# Patient Record
Sex: FEMALE | Race: BLACK OR AFRICAN AMERICAN | Employment: OTHER | URBAN - METROPOLITAN AREA
[De-identification: names, ages, dates, MRNs, and addresses within clinical notes are randomized per-mention and may not be internally consistent; named-entity substitution may affect disease eponyms.]

---

## 2017-05-11 ENCOUNTER — OFFICE VISIT (OUTPATIENT)
Dept: INTERNAL MEDICINE CLINIC | Age: 82
End: 2017-05-11

## 2017-05-11 VITALS
TEMPERATURE: 97.6 F | BODY MASS INDEX: 20.98 KG/M2 | HEART RATE: 68 BPM | RESPIRATION RATE: 14 BRPM | HEIGHT: 62 IN | OXYGEN SATURATION: 98 % | DIASTOLIC BLOOD PRESSURE: 72 MMHG | SYSTOLIC BLOOD PRESSURE: 134 MMHG | WEIGHT: 114 LBS

## 2017-05-11 DIAGNOSIS — I10 BENIGN HYPERTENSION: ICD-10-CM

## 2017-05-11 DIAGNOSIS — E78.2 MIXED HYPERLIPIDEMIA: Primary | ICD-10-CM

## 2017-05-11 DIAGNOSIS — Z79.899 ENCOUNTER FOR LONG-TERM (CURRENT) DRUG USE: ICD-10-CM

## 2017-05-11 DIAGNOSIS — R73.09 ELEVATED GLUCOSE: ICD-10-CM

## 2017-05-11 NOTE — PROGRESS NOTES
1. Have you been to the ER, urgent care clinic since your last visit? Hospitalized since your last visit? No    2. Have you seen or consulted any other health care providers outside of the [address] [place] [address] since your last visit? Include any pap smears or colon screening. No       Chief Complaint   Patient presents with    Hypertension     6 month follow up    Cholesterol Problem     6 month follow up    Coronary Artery Disease     6 month follow up    Vitamin D Deficiency     6 month follow up       Not fasting.

## 2017-05-11 NOTE — MR AVS SNAPSHOT
Visit Information Date & Time Provider Department Dept. Phone Encounter #  
 5/11/2017 11:40 AM Sina Willson MD [place] [id number] [facility] [phone] Follow-up Instructions Return in about 6 months (around 11/11/2017). Upcoming Health Maintenance Date Due DTaP/Tdap/Td series (1 - Tdap) 6/12/1952 Pneumococcal 65+ Low/Medium Risk (2 of 2 - PCV13) 1/2/2015 INFLUENZA AGE 9 TO ADULT 8/1/2017 MEDICARE YEARLY EXAM 11/10/2017 GLAUCOMA SCREENING Q2Y 7/1/2018 Allergies as of 5/11/2017  Review Complete On: 5/11/2017 By: Sina Willson MD  
  
 Severity Noted Reaction Type Reactions Aspirin  02/02/2012   Side Effect Hives Low dose aspirin is ok. Pcn [Penicillins]  01/06/2012    Rash Current Immunizations  Reviewed on 4/25/2016 Name Date Influenza Vaccine 10/23/2015 12:46 PM, 10/23/2014, 11/1/2013 Pneumococcal Polysaccharide (PPSV-23) 1/2/2014 Not reviewed this visit You Were Diagnosed With   
  
 Codes Comments Mixed hyperlipidemia    -  Primary ICD-10-CM: G54.9 ICD-9-CM: 272.2 Benign hypertension     ICD-10-CM: I10 
ICD-9-CM: 401.1 Seasonal allergic rhinitis, unspecified allergic rhinitis trigger     ICD-10-CM: J30.2 ICD-9-CM: 477.9 Encounter for long-term (current) drug use     ICD-10-CM: Z79.899 ICD-9-CM: V58.69 Vitals BP Pulse Temp Resp Height(growth percentile) Weight(growth percentile) 134/72 (BP 1 Location: Left arm, BP Patient Position: Sitting) 68 97.6 °F (36.4 °C) (Oral) 14 5' 1.5\" (1.562 m) 114 lb (51.7 kg) SpO2 BMI OB Status Smoking Status 98% 21.19 kg/m2 Postmenopausal Former Smoker Vitals History BMI and BSA Data Body Mass Index Body Surface Area  
 21.19 kg/m 2 1.5 m 2 Preferred Pharmacy Pharmacy Name Phone [facility] [address] [phone] Your Updated Medication List  
  
   
 This list is accurate as of: 5/11/17  1:00 PM.  Always use your most recent med list.  
  
  
  
  
 alendronate 70 mg tablet Commonly known as:  FOSAMAX Take 1 Tab by mouth every seven (7) days. amLODIPine-valsartan  mg per tablet Commonly known as:  Elesa Spotted Take 1 Tab by mouth daily. Need office visit for further refills  
  
 aspirin delayed-release 81 mg tablet Take 81 mg by mouth daily. metoprolol tartrate 50 mg tablet Commonly known as:  LOPRESSOR  
TAKE ONE TABLET BY MOUTH TWICE A DAY  
  
 rosuvastatin 20 mg tablet Commonly known as:  CRESTOR Take 1 Tab by mouth nightly. VITAMIN D3 2,000 unit Tab Generic drug:  cholecalciferol (vitamin D3) Take 1 tablet by mouth daily. ZETIA 10 mg tablet Generic drug:  ezetimibe TAKE ONE TABLET BY MOUTH DAILY Follow-up Instructions Return in about 6 months (around 11/11/2017). Introducing [facility]! [employer] introduces [employer] patient portal. Now you can access parts of your medical record, email your doctor's office, and request medication refills online. 1. In your internet browser, go to [email or web address] 2. Click on the First Time User? Click Here link in the Sign In box. You will see the New Member Sign Up page. 3. Enter your [employer] Access Code exactly as it appears below. You will not need to use this code after youve completed the sign-up process. If you do not sign up before the expiration date, you must request a new code. · [employer] Access Code: [email or web address] Expires: 8/9/2017  1:00 PM 
 
4. Enter the last four digits of your Social Security Number (xxxx) and Date of Birth (mm/dd/yyyy) as indicated and click Submit. You will be taken to the next sign-up page. 5. Create a [employer] ID. This will be your [employer] login ID and cannot be changed, so think of one that is secure and easy to remember. 6. Create a [employer] password. You can change your password at any time. 7. Enter your Password Reset Question and Answer. This can be used at a later time if you forget your password. 8. Enter your e-mail address. You will receive e-mail notification when new information is available in [address] 9. Click Sign Up. You can now view and download portions of your medical record. 10. Click the Download Summary menu link to download a portable copy of your medical information. If you have questions, please visit the Frequently Asked Questions section of the [employer] website. Remember, [employer] is NOT to be used for urgent needs. For medical emergencies, dial 911. Now available from your iPhone and Android! Please provide this summary of care documentation to your next provider. Your primary care clinician is listed as Giselle Banegas. If you have any questions after today's visit, please call [phone].

## 2017-05-11 NOTE — PROGRESS NOTES
Subjective:      Mary Burden is a 80 y.o. female who presents today for follow up of her hypertension and hyperlipidemia with CAD, and elevated glucose. She remains active. Lives with her daughter. She denies any chest pain, shortness of breath, syncope, headaches, nausea/vomiting, or any bowel changes. Patient Active Problem List   Diagnosis Code    S/P CABG x 3 Z95.1    S/P colonoscopy Z98.890    HTN (hypertension) I10    Hyperlipidemia E78.5    Environmental allergies Z91.09    CAD (coronary artery disease) I25.10    Vitamin D deficiency E55.9    Advance directive discussed with patient Z70.80    Osteoporosis M81.0    H/O bone density study Z92.89     Current Outpatient Prescriptions   Medication Sig Dispense Refill    metoprolol tartrate (LOPRESSOR) 50 mg tablet TAKE ONE TABLET BY MOUTH TWICE A DAY 60 Tab 5    ZETIA 10 mg tablet TAKE ONE TABLET BY MOUTH DAILY 30 Tab 5    amLODIPine-valsartan (EXFORGE)  mg per tablet Take 1 Tab by mouth daily. Need office visit for further refills 90 Tab 0    rosuvastatin (CRESTOR) 20 mg tablet Take 1 Tab by mouth nightly. 30 Tab 5    cholecalciferol, vitamin D3, (VITAMIN D3) 2,000 unit tab Take 1 tablet by mouth daily.  aspirin delayed-release 81 mg tablet Take 81 mg by mouth daily.  alendronate (FOSAMAX) 70 mg tablet Take 1 Tab by mouth every seven (7) days. 12 Tab 3        Review of Systems    Pertinent items are noted in HPI.      Objective:     Visit Vitals    /72 (BP 1 Location: Left arm, BP Patient Position: Sitting)    Pulse 68    Temp 97.6 °F (36.4 °C) (Oral)    Resp 14    Ht 5' 1.5\" (1.562 m)    Wt 114 lb (51.7 kg)    SpO2 98%    BMI 21.19 kg/m2     General appearance: alert, cooperative, no distress, appears stated age  Head: Normocephalic, without obvious abnormality, atraumatic  Neck: supple, symmetrical, trachea midline, no adenopathy, no carotid bruit and no JVD  Lungs: clear to auscultation bilaterally  Heart: regular rate and rhythm  Extremities: extremities normal, atraumatic, no cyanosis or edema  Pulses: 2+ and symmetric    Assessment/Plan:     1. Mixed hyperlipidemia  -due for fasting lipid panel at next visit. Last done 11/2016    2. Benign hypertension  -I evaluated and recommended to continue current doses of medications. 3. Elevated glucose  -encouraged low sugar and carb diet.  -check hemoglobin A1c at next visit    4. Encounter for long-term (current) drug use       Follow-up Disposition:     Follow up in 6 months     Return if symptoms worsen or fail to improve. Advised patient to call back or return to office if symptoms worsen/change/persist.     Discussed expected course/resolution/complications of diagnosis in detail with patient. Medication risks/benefits/costs/interactions/alternatives discussed with patient. Patient was given an after visit summary which includes diagnoses, current medications, & vitals. Patient expressed understanding with the diagnosis and plan.

## 2017-05-18 RX ORDER — METOPROLOL TARTRATE 50 MG/1
TABLET ORAL
Qty: 180 TAB | Refills: 1 | Status: SHIPPED | OUTPATIENT
Start: 2017-05-18 | End: 2019-01-14

## 2017-08-02 RX ORDER — EZETIMIBE 10 MG/1
TABLET ORAL
Qty: 30 TAB | Refills: 5 | Status: SHIPPED | OUTPATIENT
Start: 2017-08-02 | End: 2018-01-30 | Stop reason: SDUPTHER

## 2017-11-10 ENCOUNTER — OFFICE VISIT (OUTPATIENT)
Dept: INTERNAL MEDICINE CLINIC | Age: 82
End: 2017-11-10

## 2017-11-10 VITALS
RESPIRATION RATE: 14 BRPM | SYSTOLIC BLOOD PRESSURE: 145 MMHG | HEIGHT: 62 IN | HEART RATE: 65 BPM | OXYGEN SATURATION: 96 % | DIASTOLIC BLOOD PRESSURE: 72 MMHG | WEIGHT: 113 LBS | TEMPERATURE: 98.7 F | BODY MASS INDEX: 20.8 KG/M2

## 2017-11-10 DIAGNOSIS — E78.2 MIXED HYPERLIPIDEMIA: ICD-10-CM

## 2017-11-10 DIAGNOSIS — R73.09 ELEVATED GLUCOSE: ICD-10-CM

## 2017-11-10 DIAGNOSIS — I10 BENIGN HYPERTENSION: Primary | ICD-10-CM

## 2017-11-10 DIAGNOSIS — Z79.899 ENCOUNTER FOR LONG-TERM (CURRENT) USE OF MEDICATIONS: ICD-10-CM

## 2017-11-10 DIAGNOSIS — Z23 ENCOUNTER FOR IMMUNIZATION: ICD-10-CM

## 2017-11-10 NOTE — PROGRESS NOTES
Subjective:      Leoncio Soto is a 80 y.o. female who presents today for follow up of her hypertension and elevated glucose. She was started on fosamax 7/2016 for osteoporosis, but stopped taking it. She has not reason. She denies any chest pain, shortness of breath, syncope, headaches, nausea/vomiting, or any bowel changes. Patient Active Problem List   Diagnosis Code    S/P CABG x 3 Z95.1    S/P colonoscopy Z98.890    HTN (hypertension) I10    Hyperlipidemia E78.5    Environmental allergies Z91.09    CAD (coronary artery disease) I25.10    Vitamin D deficiency E55.9    Advance directive discussed with patient Z70.80    Osteoporosis M81.0    H/O bone density study Z92.89     Current Outpatient Prescriptions   Medication Sig Dispense Refill    ezetimibe (ZETIA) 10 mg tablet TAKE ONE TABLET BY MOUTH DAILY 30 Tab 5    metoprolol tartrate (LOPRESSOR) 50 mg tablet TAKE ONE TABLET BY MOUTH TWICE A  Tab 1    amLODIPine-valsartan (EXFORGE)  mg per tablet Take 1 Tab by mouth daily. Need office visit for further refills 90 Tab 0    rosuvastatin (CRESTOR) 20 mg tablet Take 1 Tab by mouth nightly. 30 Tab 5    cholecalciferol, vitamin D3, (VITAMIN D3) 2,000 unit tab Take 1 tablet by mouth daily.  aspirin delayed-release 81 mg tablet Take 81 mg by mouth daily. Review of Systems    Pertinent items are noted in HPI.      Objective:     Visit Vitals    /72    Pulse 65    Temp 98.7 °F (37.1 °C) (Oral)    Resp 14    Ht 5' 1.5\" (1.562 m)    Wt 113 lb (51.3 kg)    SpO2 96%    BMI 21.01 kg/m2     General appearance: alert, cooperative, no distress, appears stated age  Head: Normocephalic, without obvious abnormality, atraumatic  Neck: supple, symmetrical, trachea midline, no adenopathy, no carotid bruit and no JVD  Lungs: clear to auscultation bilaterally  Heart: regular rate and rhythm, systolic murmur: systolic ejection 1/6,  at 2nd left intercostal space, at 2nd right intercostal space    Assessment/Plan:     1. Encounter for immunization  -received her flu vaccine today without any complications. - Influenza virus vaccine ([address]) 72 years and older ([address])  - Administration fee () for Medicare insured patients    2. Benign hypertension  -I evaluated and recommended to continue current doses of medications. - CBC WITH AUTOMATED DIFF  - METABOLIC PANEL, COMPREHENSIVE    3. Elevated glucose  -continue low sugar and carb diet. - METABOLIC PANEL, COMPREHENSIVE  - HEMOGLOBIN A1C WITH EAG    4. Mixed hyperlipidemia  -hx of CAD. -due for fasting lipid panel at this time.     - METABOLIC PANEL, COMPREHENSIVE  - LIPID PANEL    5. Encounter for long-term (current) use of medications    - CBC WITH AUTOMATED DIFF  - METABOLIC PANEL, COMPREHENSIVE  - LIPID PANEL     Orders Placed This Encounter    Influenza virus vaccine ([address]) 72 years and older ([address])    CBC WITH AUTOMATED DIFF    METABOLIC PANEL, COMPREHENSIVE    LIPID PANEL    HEMOGLOBIN A1C WITH EAG    Administration fee () for Medicare insured patients      Follow-up Disposition:     Follow up in 6 months     Return if symptoms worsen or fail to improve. Advised patient to call back or return to office if symptoms worsen/change/persist.     Discussed expected course/resolution/complications of diagnosis in detail with patient. Medication risks/benefits/costs/interactions/alternatives discussed with patient. Patient was given an after visit summary which includes diagnoses, current medications, & vitals. Patient expressed understanding with the diagnosis and plan.

## 2017-11-10 NOTE — PATIENT INSTRUCTIONS
Vaccine Information Statement    Influenza (Flu) Vaccine (Inactivated or Recombinant): What you need to know    Many Vaccine Information Statements are available in Welsh and other languages. See www.immunize.org/vis  Hojas de Información Sobre Vacunas están disponibles en Español y en muchos otros idiomas. Visite www.immunize.org/vis    1. Why get vaccinated? Influenza (flu) is a contagious disease that spreads around the [place] every year, usually between October and May. Flu is caused by influenza viruses, and is spread mainly by coughing, sneezing, and close contact. Anyone can get flu. Flu strikes suddenly and can last several days. Symptoms vary by age, but can include:   fever/chills   sore throat   muscle aches   fatigue   cough   headache    runny or stuffy nose    Flu can also lead to pneumonia and blood infections, and cause diarrhea and seizures in children. If you have a medical condition, such as heart or lung disease, flu can make it worse. Flu is more dangerous for some people. Infants and young children, people 72years of age and older, pregnant women, and people with certain health conditions or a weakened immune system are at greatest risk. Each year thousands of people in the [facility] die from flu, and many more are hospitalized. Flu vaccine can:   keep you from getting flu,   make flu less severe if you do get it, and   keep you from spreading flu to your family and other people. 2. Inactivated and recombinant flu vaccines    A dose of flu vaccine is recommended every flu season. Children 6 months through 6years of age may need two doses during the same flu season. Everyone else needs only one dose each flu season.        Some inactivated flu vaccines contain a very small amount of a mercury-based preservative called thimerosal. Studies have not shown thimerosal in vaccines to be harmful, but flu vaccines that do not contain thimerosal are available. There is no live flu virus in flu shots. They cannot cause the flu. There are many flu viruses, and they are always changing. Each year a new flu vaccine is made to protect against three or four viruses that are likely to cause disease in the upcoming flu season. But even when the vaccine doesnt exactly match these viruses, it may still provide some protection    Flu vaccine cannot prevent:   flu that is caused by a virus not covered by the vaccine, or   illnesses that look like flu but are not. It takes about 2 weeks for protection to develop after vaccination, and protection lasts through the flu season. 3. Some people should not get this vaccine    Tell the person who is giving you the vaccine:     If you have any severe, life-threatening allergies. If you ever had a life-threatening allergic reaction after a dose of flu vaccine, or have a severe allergy to any part of this vaccine, you may be advised not to get vaccinated. Most, but not all, types of flu vaccine contain a small amount of egg protein.  If you ever had Guillain-Barré Syndrome (also called GBS). Some people with a history of GBS should not get this vaccine. This should be discussed with your doctor.  If you are not feeling well. It is usually okay to get flu vaccine when you have a mild illness, but you might be asked to come back when you feel better. 4. Risks of a vaccine reaction    With any medicine, including vaccines, there is a chance of reactions. These are usually mild and go away on their own, but serious reactions are also possible. Most people who get a flu shot do not have any problems with it.      Minor problems following a flu shot include:    soreness, redness, or swelling where the shot was given     hoarseness   sore, red or itchy eyes   cough   fever   aches   headache   itching   fatigue  If these problems occur, they usually begin soon after the shot and last 1 or 2 days. More serious problems following a flu shot can include the following:     There may be a small increased risk of Guillain-Barré Syndrome (GBS) after inactivated flu vaccine. This risk has been estimated at 1 or 2 additional cases per million people vaccinated. This is much lower than the risk of severe complications from flu, which can be prevented by flu vaccine.  Young children who get the flu shot along with pneumococcal vaccine (PCV13) and/or DTaP vaccine at the same time might be slightly more likely to have a seizure caused by fever. Ask your doctor for more information. Tell your doctor if a child who is getting flu vaccine has ever had a seizure. Problems that could happen after any injected vaccine:      People sometimes faint after a medical procedure, including vaccination. Sitting or lying down for about 15 minutes can help prevent fainting, and injuries caused by a fall. Tell your doctor if you feel dizzy, or have vision changes or ringing in the ears.  Some people get severe pain in the shoulder and have difficulty moving the arm where a shot was given. This happens very rarely.  Any medication can cause a severe allergic reaction. Such reactions from a vaccine are very rare, estimated at about 1 in a million doses, and would happen within a few minutes to a few hours after the vaccination. As with any medicine, there is a very remote chance of a vaccine causing a serious injury or death. The safety of vaccines is always being monitored. For more information, visit: www.cdc.gov/vaccinesafety/    5. What if there is a serious reaction? What should I look for?  Look for anything that concerns you, such as signs of a severe allergic reaction, very high fever, or unusual behavior.     Signs of a severe allergic reaction can include hives, swelling of the face and throat, difficulty breathing, a fast heartbeat, dizziness, and weakness - usually within a few minutes to a few hours after the vaccination. What should I do?  If you think it is a severe allergic reaction or other emergency that cant wait, call 9-1-1 and get the person to the nearest hospital. Otherwise, call your doctor.  Reactions should be reported to the Vaccine Adverse Event Reporting System (VAERS). Your doctor should file this report, or you can do it yourself through  the VAERS web site at www.vaers. [facility].gov, or by calling 4-963.982.7906. VAERS does not give medical advice. 6. The National Vaccine Injury Compensation Program    The [facility] Vaccine Injury Compensation Program (VICP) is a federal program that was created to compensate people who may have been injured by certain vaccines. Persons who believe they may have been injured by a vaccine can learn about the program and about filing a claim by calling 5-675.542.3044 or visiting the [employer] website at www.[facility].gov/vaccinecompensation. There is a time limit to file a claim for compensation. 7. How can I learn more?  Ask your healthcare provider. He or she can give you the vaccine package insert or suggest other sources of information.  Call your local or state health department.  Contact the Centers for Disease Control and Prevention (CDC):  - Call 8-780.375.1464 (1-800-CDC-INFO) or  - Visit CDCs website at www.cdc.gov/flu    Vaccine Information Statement   Inactivated Influenza Vaccine   8/7/2015  42 ANN Sinha Conquest [id number]    Department of Health and Human Services  Centers for Disease Control and Prevention    Office Use Only      Vaccine Information Statement    Influenza (Flu) Vaccine (Inactivated or Recombinant): What you need to know    Many Vaccine Information Statements are available in [place] and other languages. See www.immunize.org/vis  Hojas de Información Sobre Vacunas están disponibles en Español y en muchos otros idiomas. Visite www.immunize.org/vis    1. Why get vaccinated?     Influenza (flu) is a contagious disease that spreads around the [place] every year, usually between October and May. Flu is caused by influenza viruses, and is spread mainly by coughing, sneezing, and close contact. Anyone can get flu. Flu strikes suddenly and can last several days. Symptoms vary by age, but can include:   fever/chills   sore throat   muscle aches   fatigue   cough   headache    runny or stuffy nose    Flu can also lead to pneumonia and blood infections, and cause diarrhea and seizures in children. If you have a medical condition, such as heart or lung disease, flu can make it worse. Flu is more dangerous for some people. Infants and young children, people 72years of age and older, pregnant women, and people with certain health conditions or a weakened immune system are at greatest risk. Each year thousands of people in the [facility] die from flu, and many more are hospitalized. Flu vaccine can:   keep you from getting flu,   make flu less severe if you do get it, and   keep you from spreading flu to your family and other people. 2. Inactivated and recombinant flu vaccines    A dose of flu vaccine is recommended every flu season. Children 6 months through 6years of age may need two doses during the same flu season. Everyone else needs only one dose each flu season. Some inactivated flu vaccines contain a very small amount of a mercury-based preservative called thimerosal. Studies have not shown thimerosal in vaccines to be harmful, but flu vaccines that do not contain thimerosal are available. There is no live flu virus in flu shots. They cannot cause the flu. There are many flu viruses, and they are always changing. Each year a new flu vaccine is made to protect against three or four viruses that are likely to cause disease in the upcoming flu season.  But even when the vaccine doesnt exactly match these viruses, it may still provide some protection    Flu vaccine cannot prevent:   flu that is caused by a virus not covered by the vaccine, or   illnesses that look like flu but are not. It takes about 2 weeks for protection to develop after vaccination, and protection lasts through the flu season. 3. Some people should not get this vaccine    Tell the person who is giving you the vaccine:     If you have any severe, life-threatening allergies. If you ever had a life-threatening allergic reaction after a dose of flu vaccine, or have a severe allergy to any part of this vaccine, you may be advised not to get vaccinated. Most, but not all, types of flu vaccine contain a small amount of egg protein.  If you ever had Guillain-Barré Syndrome (also called GBS). Some people with a history of GBS should not get this vaccine. This should be discussed with your doctor.  If you are not feeling well. It is usually okay to get flu vaccine when you have a mild illness, but you might be asked to come back when you feel better. 4. Risks of a vaccine reaction    With any medicine, including vaccines, there is a chance of reactions. These are usually mild and go away on their own, but serious reactions are also possible. Most people who get a flu shot do not have any problems with it. Minor problems following a flu shot include:    soreness, redness, or swelling where the shot was given     hoarseness   sore, red or itchy eyes   cough   fever   aches   headache   itching   fatigue  If these problems occur, they usually begin soon after the shot and last 1 or 2 days. More serious problems following a flu shot can include the following:     There may be a small increased risk of Guillain-Barré Syndrome (GBS) after inactivated flu vaccine. This risk has been estimated at 1 or 2 additional cases per million people vaccinated. This is much lower than the risk of severe complications from flu, which can be prevented by flu vaccine.  Young children who get the flu shot along with pneumococcal vaccine (PCV13) and/or DTaP vaccine at the same time might be slightly more likely to have a seizure caused by fever. Ask your doctor for more information. Tell your doctor if a child who is getting flu vaccine has ever had a seizure. Problems that could happen after any injected vaccine:      People sometimes faint after a medical procedure, including vaccination. Sitting or lying down for about 15 minutes can help prevent fainting, and injuries caused by a fall. Tell your doctor if you feel dizzy, or have vision changes or ringing in the ears.  Some people get severe pain in the shoulder and have difficulty moving the arm where a shot was given. This happens very rarely.  Any medication can cause a severe allergic reaction. Such reactions from a vaccine are very rare, estimated at about 1 in a million doses, and would happen within a few minutes to a few hours after the vaccination. As with any medicine, there is a very remote chance of a vaccine causing a serious injury or death. The safety of vaccines is always being monitored. For more information, visit: www.cdc.gov/vaccinesafety/    5. What if there is a serious reaction? What should I look for?  Look for anything that concerns you, such as signs of a severe allergic reaction, very high fever, or unusual behavior. Signs of a severe allergic reaction can include hives, swelling of the face and throat, difficulty breathing, a fast heartbeat, dizziness, and weakness - usually within a few minutes to a few hours after the vaccination. What should I do?  If you think it is a severe allergic reaction or other emergency that cant wait, call 9-1-1 and get the person to the nearest hospital. Otherwise, call your doctor.  Reactions should be reported to the Vaccine Adverse Event Reporting System (VAERS).  Your doctor should file this report, or you can do it yourself through  the VAERS web site at www.vaers. [facility].gov, or by calling 8-462.121.2012. VAERS does not give medical advice. 6. The National Vaccine Injury Compensation Program    The [facility] Vaccine Injury Compensation Program (VICP) is a federal program that was created to compensate people who may have been injured by certain vaccines. Persons who believe they may have been injured by a vaccine can learn about the program and about filing a claim by calling 3-623.544.5711 or visiting the [facility] [address] website at www.[facility].gov/vaccinecompensation. There is a time limit to file a claim for compensation. 7. How can I learn more?  Ask your healthcare provider. He or she can give you the vaccine package insert or suggest other sources of information.  Call your local or state health department.  Contact the Centers for Disease Control and Prevention (CDC):  - Call 5-242.267.4806 (1-800-CDC-INFO) or  - Visit CDCs website at www.cdc.gov/flu    Vaccine Information Statement   Inactivated Influenza Vaccine   8/7/2015  42 ANN Sinha [employer]inel[employer] [id number]    Department of Health and Human Services  Centers for Disease Control and Prevention    Office Use Only

## 2017-11-10 NOTE — PROGRESS NOTES
Patient's identity verified with two patient identifiers (name and date of birth). 1. Have you been to the ER, urgent care clinic since your last visit? Hospitalized since your last visit? No  2. Have you seen or consulted any other health care providers outside of the [address] [place] [address] since your last visit? Include any pap smears or colon screening. No    Chief Complaint   Patient presents with    Cholesterol Problem     6 month follow up    Hypertension     6 month follow up     Not fasting. Health Maintenance Due   Topic Date Due    DTaP/Tdap/Td series (1 - Tdap)  Unsure of status. 06/12/1952    Pneumococcal 65+ Low/Medium Risk (2 of 2 - PCV13)  Has not had second vaccine. 01/02/2015    Influenza Age 5 to Adult   Has not had- requesting today. 08/01/2017    MEDICARE YEARLY EXAM   Due. 11/10/2017     Reviewed record in preparation for visit and have obtained necessary documentation. Wt Readings from Last 3 Encounters:   11/10/17 113 lb (51.3 kg)   05/11/17 114 lb (51.7 kg)   11/09/16 115 lb (52.2 kg)     Temp Readings from Last 3 Encounters:   11/10/17 98.7 °F (37.1 °C) (Oral)   05/11/17 97.6 °F (36.4 °C) (Oral)   11/09/16 97 °F (36.1 °C) (Oral)     BP Readings from Last 3 Encounters:   11/10/17 162/80   05/11/17 134/72   11/09/16 104/60     Pulse Readings from Last 3 Encounters:   11/10/17 65   05/11/17 68   11/09/16 62       Abuse Screening:  :     Abuse Screening Questionnaire 11/10/2017 4/23/2015   Do you ever feel afraid of your partner? N N   Are you in a relationship with someone who physically or mentally threatens you? N N   Is it safe for you to go home? Y Y     Patient opts for high dose Flu Vaccine today in office, per receiving order from provider Dr. Gregory.  All questions answered, consent form signed, and VIS given. 0.5 ML given in left  deltoid, IM route, without difficulty, patient tolerated well.   Patient was monitored for 15 minutes after administration with no complications.     LOT: [id number]  EXP: 5/10/18  : Giuseppe Calero  NDC: 32715-565-24  SITE: LEFT DELTOID  ROUTE: INTRAMUSCULAR

## 2017-11-10 NOTE — MR AVS SNAPSHOT
Visit Information Date & Time Provider Department Dept. Phone Encounter #  
 11/10/2017 11:00 AM Riya Toribio MD [place] [id number] [facility] [phone] [id number] Follow-up Instructions Return in about 6 months (around 5/10/2018). Upcoming Health Maintenance Date Due DTaP/Tdap/Td series (1 - Tdap) 6/12/1952 Pneumococcal 65+ Low/Medium Risk (2 of 2 - PCV13) 1/2/2015 Influenza Age 5 to Adult 8/1/2017 MEDICARE YEARLY EXAM 11/10/2017 GLAUCOMA SCREENING Q2Y 8/24/2019 Allergies as of 11/10/2017  Review Complete On: 11/10/2017 By: Riya Toribio MD  
  
 Severity Noted Reaction Type Reactions Aspirin  02/02/2012   Side Effect Hives Low dose aspirin is ok. Pcn [Penicillins]  01/06/2012    Rash Current Immunizations  Reviewed on 11/10/2017 Name Date Influenza High Dose Vaccine PF  Incomplete Influenza Vaccine 10/23/2015 12:46 PM, 10/23/2014, 11/1/2013 Pneumococcal Polysaccharide (PPSV-23) 1/2/2014 Reviewed by Riya Toribio MD on 11/10/2017 at 11:57 AM  
You Were Diagnosed With   
  
 Codes Comments Benign hypertension    -  Primary ICD-10-CM: I10 
ICD-9-CM: 401.1 Encounter for immunization     ICD-10-CM: D76 ICD-9-CM: V03.89 Elevated glucose     ICD-10-CM: R73.09 
ICD-9-CM: 790.29 Mixed hyperlipidemia     ICD-10-CM: E78.2 ICD-9-CM: 272.2 Encounter for long-term (current) use of medications     ICD-10-CM: Z79.899 ICD-9-CM: V58.69 Vitals BP Pulse Temp Resp Height(growth percentile) Weight(growth percentile) 145/72 65 98.7 °F (37.1 °C) (Oral) 14 5' 1.5\" (1.562 m) 113 lb (51.3 kg) SpO2 BMI OB Status Smoking Status 96% 21.01 kg/m2 Postmenopausal Former Smoker Vitals History BMI and BSA Data Body Mass Index Body Surface Area 21.01 kg/m 2 1.49 m 2 Preferred Pharmacy Pharmacy Name Phone [facility] [address] [phone] Your Updated Medication List  
  
   
This list is accurate as of: 11/10/17 12:07 PM.  Always use your most recent med list. amLODIPine-valsartan  mg per tablet Commonly known as:  Vu Hedrickler Take 1 Tab by mouth daily. Need office visit for further refills  
  
 aspirin delayed-release 81 mg tablet Take 81 mg by mouth daily. ezetimibe 10 mg tablet Commonly known as:  Zelpha Ben TAKE ONE TABLET BY MOUTH DAILY  
  
 metoprolol tartrate 50 mg tablet Commonly known as:  LOPRESSOR  
TAKE ONE TABLET BY MOUTH TWICE A DAY  
  
 rosuvastatin 20 mg tablet Commonly known as:  CRESTOR Take 1 Tab by mouth nightly. VITAMIN D3 2,000 unit Tab Generic drug:  cholecalciferol (vitamin D3) Take 1 tablet by mouth daily. We Performed the Following ADMIN INFLUENZA VIRUS VAC [ HCPCS] CBC WITH AUTOMATED DIFF [95242 CPT(R)] HEMOGLOBIN A1C WITH EAG [01454 CPT(R)] INFLUENZA VIRUS VACCINE, HIGH DOSE SEASONAL, PRESERVATIVE FREE [97855 CPT(R)] LIPID PANEL [08637 CPT(R)] METABOLIC PANEL, COMPREHENSIVE [17297 CPT(R)] Follow-up Instructions Return in about 6 months (around 5/10/2018). Patient Instructions Vaccine Information Statement Influenza (Flu) Vaccine (Inactivated or Recombinant): What you need to know Many Vaccine Information Statements are available in Ukrainian and other languages. See www.immunize.org/vis Hojas de Información Sobre Vacunas están disponibles en Español y en muchos otros idiomas. Visite www.immunize.org/vis 1. Why get vaccinated? Influenza (flu) is a contagious disease that spreads around the [place] every year, usually between October and May. Flu is caused by influenza viruses, and is spread mainly by coughing, sneezing, and close contact. Anyone can get flu. Flu strikes suddenly and can last several days. Symptoms vary by age, but can include: 
 fever/chills  sore throat  muscle aches  fatigue  cough  headache  runny or stuffy nose Flu can also lead to pneumonia and blood infections, and cause diarrhea and seizures in children. If you have a medical condition, such as heart or lung disease, flu can make it worse. Flu is more dangerous for some people. Infants and young children, people 72years of age and older, pregnant women, and people with certain health conditions or a weakened immune system are at greatest risk. Each year thousands of people in the [facility] die from flu, and many more are hospitalized. Flu vaccine can: 
 keep you from getting flu, 
 make flu less severe if you do get it, and 
 keep you from spreading flu to your family and other people. 2. Inactivated and recombinant flu vaccines A dose of flu vaccine is recommended every flu season. Children 6 months through 6years of age may need two doses during the same flu season. Everyone else needs only one dose each flu season. Some inactivated flu vaccines contain a very small amount of a mercury-based preservative called thimerosal. Studies have not shown thimerosal in vaccines to be harmful, but flu vaccines that do not contain thimerosal are available. There is no live flu virus in flu shots. They cannot cause the flu. There are many flu viruses, and they are always changing. Each year a new flu vaccine is made to protect against three or four viruses that are likely to cause disease in the upcoming flu season. But even when the vaccine doesnt exactly match these viruses, it may still provide some protection Flu vaccine cannot prevent: 
 flu that is caused by a virus not covered by the vaccine, or 
 illnesses that look like flu but are not. It takes about 2 weeks for protection to develop after vaccination, and protection lasts through the flu season. 3. Some people should not get this vaccine Tell the person who is giving you the vaccine:  If you have any severe, life-threatening allergies. If you ever had a life-threatening allergic reaction after a dose of flu vaccine, or have a severe allergy to any part of this vaccine, you may be advised not to get vaccinated. Most, but not all, types of flu vaccine contain a small amount of egg protein.  If you ever had Guillain-Barré Syndrome (also called GBS). Some people with a history of GBS should not get this vaccine. This should be discussed with your doctor.  If you are not feeling well. It is usually okay to get flu vaccine when you have a mild illness, but you might be asked to come back when you feel better. 4. Risks of a vaccine reaction With any medicine, including vaccines, there is a chance of reactions. These are usually mild and go away on their own, but serious reactions are also possible. Most people who get a flu shot do not have any problems with it. Minor problems following a flu shot include:  
 soreness, redness, or swelling where the shot was given  hoarseness  sore, red or itchy eyes  cough  fever  aches  headache  itching  fatigue If these problems occur, they usually begin soon after the shot and last 1 or 2 days. More serious problems following a flu shot can include the following:  There may be a small increased risk of Guillain-Barré Syndrome (GBS) after inactivated flu vaccine. This risk has been estimated at 1 or 2 additional cases per million people vaccinated. This is much lower than the risk of severe complications from flu, which can be prevented by flu vaccine.  Young children who get the flu shot along with pneumococcal vaccine (PCV13) and/or DTaP vaccine at the same time might be slightly more likely to have a seizure caused by fever. Ask your doctor for more information. Tell your doctor if a child who is getting flu vaccine has ever had a seizure. Problems that could happen after any injected vaccine:  People sometimes faint after a medical procedure, including vaccination. Sitting or lying down for about 15 minutes can help prevent fainting, and injuries caused by a fall. Tell your doctor if you feel dizzy, or have vision changes or ringing in the ears.  Some people get severe pain in the shoulder and have difficulty moving the arm where a shot was given. This happens very rarely.  Any medication can cause a severe allergic reaction. Such reactions from a vaccine are very rare, estimated at about 1 in a million doses, and would happen within a few minutes to a few hours after the vaccination. As with any medicine, there is a very remote chance of a vaccine causing a serious injury or death. The safety of vaccines is always being monitored. For more information, visit: www.cdc.gov/vaccinesafety/ 
 
 
6. The National Vaccine Injury Compensation Program 
 
The [facility] Vaccine Injury Compensation Program (VICP) is a federal program that was created to compensate people who may have been injured by certain vaccines. Persons who believe they may have been injured by a vaccine can learn about the program and about filing a claim by calling 0-229.960.2174 or visiting the [employer] website at www.[facility].gov/vaccinecompensation. There is a time limit to file a claim for compensation. 7. How can I learn more?  Ask your healthcare provider. He or she can give you the vaccine package insert or suggest other sources of information.  Call your local or state health department.  Contact the Centers for Disease Control and Prevention (CDC): 
- Call 4-181.824.2447 (1-800-CDC-INFO) or 
- Visit CDCs website at www.cdc.gov/flu Vaccine Information Statement Inactivated Influenza Vaccine 8/7/2015 
42 URadha De Oliveira 695FL-80 [facility] [employer] Centers for Disease Control and Prevention Office Use Only Vaccine Information Statement Influenza (Flu) Vaccine (Inactivated or Recombinant): What you need to know Many Vaccine Information Statements are available in Lithuanian and other languages. See www.immunize.org/vis Hojas de Información Sobre Vacunas están disponibles en Español y en muchos otros idiomas. Visite www.immunize.org/vis 1. Why get vaccinated? Influenza (flu) is a contagious disease that spreads around the [place] every year, usually between October and May. Flu is caused by influenza viruses, and is spread mainly by coughing, sneezing, and close contact. Anyone can get flu. Flu strikes suddenly and can last several days. Symptoms vary by age, but can include: 
 fever/chills  sore throat  muscle aches  fatigue  cough  headache  runny or stuffy nose Flu can also lead to pneumonia and blood infections, and cause diarrhea and seizures in children. If you have a medical condition, such as heart or lung disease, flu can make it worse. Flu is more dangerous for some people. Infants and young children, people 72years of age and older, pregnant women, and people with certain health conditions or a weakened immune system are at greatest risk. Each year thousands of people in the [facility] die from flu, and many more are hospitalized. Flu vaccine can: 
 keep you from getting flu, 
 make flu less severe if you do get it, and 
 keep you from spreading flu to your family and other people. 2. Inactivated and recombinant flu vaccines A dose of flu vaccine is recommended every flu season. Children 6 months through 6years of age may need two doses during the same flu season. Everyone else needs only one dose each flu season. Some inactivated flu vaccines contain a very small amount of a mercury-based preservative called thimerosal. Studies have not shown thimerosal in vaccines to be harmful, but flu vaccines that do not contain thimerosal are available. There is no live flu virus in flu shots. They cannot cause the flu. There are many flu viruses, and they are always changing. Each year a new flu vaccine is made to protect against three or four viruses that are likely to cause disease in the upcoming flu season. But even when the vaccine doesnt exactly match these viruses, it may still provide some protection Flu vaccine cannot prevent: 
 flu that is caused by a virus not covered by the vaccine, or 
 illnesses that look like flu but are not. It takes about 2 weeks for protection to develop after vaccination, and protection lasts through the flu season. 3. Some people should not get this vaccine Tell the person who is giving you the vaccine:  If you have any severe, life-threatening allergies.    
If you ever had a life-threatening allergic reaction after a dose of flu vaccine, or have a severe allergy to any part of this vaccine, you may be advised not to get vaccinated. Most, but not all, types of flu vaccine contain a small amount of egg protein.  If you ever had Guillain-Barré Syndrome (also called GBS). Some people with a history of GBS should not get this vaccine. This should be discussed with your doctor.  If you are not feeling well. It is usually okay to get flu vaccine when you have a mild illness, but you might be asked to come back when you feel better. 4. Risks of a vaccine reaction With any medicine, including vaccines, there is a chance of reactions. These are usually mild and go away on their own, but serious reactions are also possible. Most people who get a flu shot do not have any problems with it. Minor problems following a flu shot include:  
 soreness, redness, or swelling where the shot was given  hoarseness  sore, red or itchy eyes  cough  fever  aches  headache  itching  fatigue If these problems occur, they usually begin soon after the shot and last 1 or 2 days. More serious problems following a flu shot can include the following:  There may be a small increased risk of Guillain-Barré Syndrome (GBS) after inactivated flu vaccine. This risk has been estimated at 1 or 2 additional cases per million people vaccinated. This is much lower than the risk of severe complications from flu, which can be prevented by flu vaccine.  Young children who get the flu shot along with pneumococcal vaccine (PCV13) and/or DTaP vaccine at the same time might be slightly more likely to have a seizure caused by fever. Ask your doctor for more information. Tell your doctor if a child who is getting flu vaccine has ever had a seizure. Problems that could happen after any injected vaccine:  People sometimes faint after a medical procedure, including vaccination.  Sitting or lying down for about 15 minutes can help prevent fainting, and injuries caused by a fall. Tell your doctor if you feel dizzy, or have vision changes or ringing in the ears.  Some people get severe pain in the shoulder and have difficulty moving the arm where a shot was given. This happens very rarely.  Any medication can cause a severe allergic reaction. Such reactions from a vaccine are very rare, estimated at about 1 in a million doses, and would happen within a few minutes to a few hours after the vaccination. As with any medicine, there is a very remote chance of a vaccine causing a serious injury or death. The safety of vaccines is always being monitored. For more information, visit: www.cdc.gov/vaccinesafety/ 
 
 
The [facility] Vaccine Injury Compensation Program (VICP) is a federal program that was created to compensate people who may have been injured by certain vaccines.  
 
Persons who believe they may have been injured by a vaccine can learn about the program and about filing a claim by calling 0-728.416.8967 or visiting the [employer] website at www.[facility].gov/vaccinecompensation. There is a time limit to file a claim for compensation. 7. How can I learn more?  Ask your healthcare provider. He or she can give you the vaccine package insert or suggest other sources of information.  Call your local or state health department.  Contact the Centers for Disease Control and Prevention (CDC): 
- Call 2-999.346.2230 (1-800-CDC-INFO) or 
- Visit CDCs website at www.cdc.gov/flu Vaccine Information Statement Inactivated Influenza Vaccine 8/7/2015 
42 ANN Alvarez 209SM-75 [facility] and [employer] Centers for Disease Control and Prevention Office Use Only Introducing [address] Татьяна Hernandez introduces [employer] patient portal. Now you can access parts of your medical record, email your doctor's office, and request medication refills online. 1. In your internet browser, go to [email or web address] 2. Click on the First Time User? Click Here link in the Sign In box. You will see the New Member Sign Up page. 3. Enter your [email or web address] Access Code exactly as it appears below. You will not need to use this code after youve completed the sign-up process. If you do not sign up before the expiration date, you must request a new code. · [email or web address] Access Code: [email or web address] Expires: 2/8/2018 11:21 AM 
 
4. Enter the last four digits of your Social Security Number (xxxx) and Date of Birth (mm/dd/yyyy) as indicated and click Submit. You will be taken to the next sign-up page. 5. Create a [employer] ID. This will be your [employer] login ID and cannot be changed, so think of one that is secure and easy to remember. 6. Create a [employer] password. You can change your password at any time. 7. Enter your Password Reset Question and Answer. This can be used at a later time if you forget your password. 8. Enter your e-mail address.  You will receive e-mail notification when new information is available in [employer]. 9. Click Sign Up. You can now view and download portions of your medical record. 10. Click the Download Summary menu link to download a portable copy of your medical information. If you have questions, please visit the Frequently Asked Questions section of the [employer] website. Remember, [employer] is NOT to be used for urgent needs. For medical emergencies, dial 911. Now available from your iPhone and Android! Please provide this summary of care documentation to your next provider. Your primary care clinician is listed as Giselle Banegas. If you have any questions after today's visit, please call [phone].

## 2017-11-14 LAB
ALBUMIN SERPL-MCNC: 4.5 G/DL (ref 3.5–4.7)
ALBUMIN/GLOB SERPL: 1.4 {RATIO} (ref 1.2–2.2)
ALP SERPL-CCNC: 82 IU/L (ref 39–117)
ALT SERPL-CCNC: 26 IU/L (ref 0–32)
AST SERPL-CCNC: 30 IU/L (ref 0–40)
BASOPHILS # BLD AUTO: 0 X10E3/UL (ref 0–0.2)
BASOPHILS NFR BLD AUTO: 1 %
BILIRUB SERPL-MCNC: 0.5 MG/DL (ref 0–1.2)
BUN SERPL-MCNC: 17 MG/DL (ref 8–27)
BUN/CREAT SERPL: 21 (ref 12–28)
CALCIUM SERPL-MCNC: 11.3 MG/DL (ref 8.7–10.3)
CHLORIDE SERPL-SCNC: 100 MMOL/L (ref 96–106)
CHOLEST SERPL-MCNC: 163 MG/DL (ref 100–199)
CO2 SERPL-SCNC: 28 MMOL/L (ref 18–29)
CREAT SERPL-MCNC: 0.8 MG/DL (ref 0.57–1)
EOSINOPHIL # BLD AUTO: 0.1 X10E3/UL (ref 0–0.4)
EOSINOPHIL NFR BLD AUTO: 3 %
ERYTHROCYTE [DISTWIDTH] IN BLOOD BY AUTOMATED COUNT: 13.8 % (ref 12.3–15.4)
EST. AVERAGE GLUCOSE BLD GHB EST-MCNC: 137 MG/DL
GFR SERPLBLD CREATININE-BSD FMLA CKD-EPI: 67 ML/MIN/1.73
GFR SERPLBLD CREATININE-BSD FMLA CKD-EPI: 77 ML/MIN/1.73
GLOBULIN SER CALC-MCNC: 3.3 G/DL (ref 1.5–4.5)
GLUCOSE SERPL-MCNC: 136 MG/DL (ref 65–99)
HBA1C MFR BLD: 6.4 % (ref 4.8–5.6)
HCT VFR BLD AUTO: 38.7 % (ref 34–46.6)
HDLC SERPL-MCNC: 69 MG/DL
HGB BLD-MCNC: 12.5 G/DL (ref 11.1–15.9)
IMM GRANULOCYTES # BLD: 0 X10E3/UL (ref 0–0.1)
IMM GRANULOCYTES NFR BLD: 0 %
INTERPRETATION, 910389: NORMAL
LDLC SERPL CALC-MCNC: 82 MG/DL (ref 0–99)
LYMPHOCYTES # BLD AUTO: 1.7 X10E3/UL (ref 0.7–3.1)
LYMPHOCYTES NFR BLD AUTO: 48 %
MCH RBC QN AUTO: 28.6 PG (ref 26.6–33)
MCHC RBC AUTO-ENTMCNC: 32.3 G/DL (ref 31.5–35.7)
MCV RBC AUTO: 89 FL (ref 79–97)
MONOCYTES # BLD AUTO: 0.4 X10E3/UL (ref 0.1–0.9)
MONOCYTES NFR BLD AUTO: 11 %
NEUTROPHILS # BLD AUTO: 1.3 X10E3/UL (ref 1.4–7)
NEUTROPHILS NFR BLD AUTO: 37 %
PLATELET # BLD AUTO: 379 X10E3/UL (ref 150–379)
POTASSIUM SERPL-SCNC: 4.2 MMOL/L (ref 3.5–5.2)
PROT SERPL-MCNC: 7.8 G/DL (ref 6–8.5)
RBC # BLD AUTO: 4.37 X10E6/UL (ref 3.77–5.28)
SODIUM SERPL-SCNC: 138 MMOL/L (ref 134–144)
TRIGL SERPL-MCNC: 62 MG/DL (ref 0–149)
VLDLC SERPL CALC-MCNC: 12 MG/DL (ref 5–40)
WBC # BLD AUTO: 3.5 X10E3/UL (ref 3.4–10.8)

## 2018-05-10 ENCOUNTER — OFFICE VISIT (OUTPATIENT)
Dept: INTERNAL MEDICINE CLINIC | Age: 83
End: 2018-05-10

## 2018-05-10 VITALS
HEART RATE: 65 BPM | OXYGEN SATURATION: 98 % | BODY MASS INDEX: 20.24 KG/M2 | HEIGHT: 62 IN | DIASTOLIC BLOOD PRESSURE: 78 MMHG | WEIGHT: 110 LBS | RESPIRATION RATE: 14 BRPM | SYSTOLIC BLOOD PRESSURE: 144 MMHG | TEMPERATURE: 97.4 F

## 2018-05-10 DIAGNOSIS — Z13.31 SCREENING FOR DEPRESSION: ICD-10-CM

## 2018-05-10 DIAGNOSIS — Z13.39 SCREENING FOR ALCOHOLISM: ICD-10-CM

## 2018-05-10 DIAGNOSIS — E78.2 MIXED HYPERLIPIDEMIA: ICD-10-CM

## 2018-05-10 DIAGNOSIS — I10 BENIGN HYPERTENSION: ICD-10-CM

## 2018-05-10 DIAGNOSIS — Z79.899 ENCOUNTER FOR LONG-TERM (CURRENT) USE OF MEDICATIONS: ICD-10-CM

## 2018-05-10 DIAGNOSIS — R73.09 ELEVATED GLUCOSE: ICD-10-CM

## 2018-05-10 DIAGNOSIS — F34.1 DYSTHYMIA: ICD-10-CM

## 2018-05-10 DIAGNOSIS — Z00.00 MEDICARE ANNUAL WELLNESS VISIT, SUBSEQUENT: Primary | ICD-10-CM

## 2018-05-10 RX ORDER — PAROXETINE 10 MG/1
5 TABLET, FILM COATED ORAL
Qty: 30 TAB | Refills: 1 | Status: SHIPPED | OUTPATIENT
Start: 2018-05-10 | End: 2019-01-14

## 2018-05-10 NOTE — MR AVS SNAPSHOT
[facility], [address] [place] [address] 
[phone] Patient: Landa Dakin MRN: [id number] OWU:[id number]/[id number] Visit Information Date & Time Provider Department Dept. Phone Encounter #  
 5/10/2018 11:20 AM Clarice Zuleta MD [place] [id number] [facility] [phone] [id number] Follow-up Instructions Return in about 2 months (around 7/10/2018) for dysthymia. Upcoming Health Maintenance Date Due DTaP/Tdap/Td series (1 - Tdap) 6/15/1952 Pneumococcal 65+ Low/Medium Risk (2 of 2 - PCV13) 1/2/2015 Influenza Age 5 to Adult 8/1/2018 MEDICARE YEARLY EXAM 5/11/2019 GLAUCOMA SCREENING Q2Y 8/24/2019 Allergies as of 5/10/2018  Review Complete On: 5/10/2018 By: Clarice Zuleta MD  
  
 Severity Noted Reaction Type Reactions Aspirin  02/02/2012   Side Effect Hives Low dose aspirin is ok. Pcn [Penicillins]  01/06/2012    Rash Current Immunizations  Reviewed on 5/10/2018 Name Date Influenza High Dose Vaccine PF 11/10/2017 Influenza Vaccine 10/23/2015 12:46 PM, 10/23/2014, 11/1/2013 Pneumococcal Polysaccharide (PPSV-23) 1/2/2014 Reviewed by Clarice Zuleta MD on 5/10/2018 at 11:47 AM  
 Reviewed by Clarice Zuleta MD on 5/10/2018 at 11:48 AM  
You Were Diagnosed With   
  
 Codes Comments Medicare annual wellness visit, subsequent    -  Primary ICD-10-CM: Z00.00 ICD-9-CM: V70.0 Benign hypertension     ICD-10-CM: I10 
ICD-9-CM: 401.1 Elevated glucose     ICD-10-CM: R73.09 
ICD-9-CM: 790.29 Mixed hyperlipidemia     ICD-10-CM: E78.2 ICD-9-CM: 272.2 Encounter for long-term (current) use of medications     ICD-10-CM: Z79.899 ICD-9-CM: V58.69 Screening for alcoholism     ICD-10-CM: Z13.89 ICD-9-CM: V79.1 Screening for depression     ICD-10-CM: Z13.89 ICD-9-CM: V79.0 Vitals BP Pulse Temp Resp Height(growth percentile) Weight(growth percentile) 144/78 (BP 1 Location: Left arm, BP Patient Position: Sitting) 65 97.4 °F (36.3 °C) (Oral) 14 5' 1.5\" (1.562 m) 110 lb (49.9 kg) SpO2 BMI OB Status Smoking Status 98% 20.45 kg/m2 Postmenopausal Former Smoker Vitals History BMI and BSA Data Body Mass Index Body Surface Area  
 20.45 kg/m 2 1.47 m 2 Preferred Pharmacy Pharmacy Name Phone [address] [place], [address] [facility]. [phone] Your Updated Medication List  
  
   
This list is accurate as of 5/10/18 12:04 PM.  Always use your most recent med list. amLODIPine-valsartan  mg per tablet Commonly known as:  Cherylene Starks Take 1 Tab by mouth daily. Need office visit for further refills  
  
 aspirin delayed-release 81 mg tablet Take 81 mg by mouth daily. metoprolol tartrate 50 mg tablet Commonly known as:  LOPRESSOR  
TAKE ONE TABLET BY MOUTH TWICE A DAY  
  
 MUCINEX 1,200 mg Ta12 ER tablet Generic drug:  guaiFENesin Take 1,200 mg by mouth two (2) times a day. PARoxetine 10 mg tablet Commonly known as:  PAXIL Take 0.5 Tabs by mouth nightly. pneumococcal 13 kayla conj dip 0.5 mL Syrg injection Commonly known as:  PREVNAR-13  
0.5 mL by IntraMUSCular route once for 1 dose. rosuvastatin 20 mg tablet Commonly known as:  CRESTOR Take 1 Tab by mouth nightly. varicella-zoster recombinant (PF) 50 mcg/0.5 mL Susr injection Commonly known as:  SHINGRIX  
0.5 mL by IntraMUSCular route once for 1 dose. Repeat in 2-4 months VITAMIN D3 2,000 unit Tab Generic drug:  cholecalciferol (vitamin D3) Take 1 tablet by mouth daily. ZETIA 10 mg tablet Generic drug:  ezetimibe TAKE ONE TABLET BY MOUTH DAILY Prescriptions Printed Refills  
 varicella-zoster recombinant, PF, (SHINGRIX) 50 mcg/0.5 mL susr injection 1 Si.5 mL by IntraMUSCular route once for 1 dose. Repeat in 2-4 months Class: Print Route: IntraMUSCular  
 pneumococcal 13 kayla conj dip (PREVNAR-13) 0.5 mL syrg injection 0 Si.5 mL by IntraMUSCular route once for 1 dose. Class: Print Route: IntraMUSCular Prescriptions Sent to Pharmacy Refills PARoxetine (PAXIL) 10 mg tablet 1 Sig: Take 0.5 Tabs by mouth nightly. Class: Normal  
 Pharmacy: [address] [place], [address] [facility] [address] Ph #: [phone] Route: Oral  
  
We Performed the Following [address] [[id number] [facility]] HEMOGLOBIN A1C WITH EAG [04541 CPT(R)] METABOLIC PANEL, COMPREHENSIVE [40284 CPT(R)] CT ANNUAL ALCOHOL SCREEN 15 MIN [id number] [facility]] Follow-up Instructions Return in about 2 months (around 7/10/2018) for dysthymia. Patient Instructions Medicare Wellness Visit, Female The best way to live healthy is to have a healthy lifestyle by eating a well-balanced diet, exercising regularly, limiting alcohol and stopping smoking. Regular physical exams and screening tests are another way to keep healthy. Preventive exams provided by your health care provider can find health problems before they become diseases or illnesses. Preventive services including immunizations, screening tests, monitoring and exams can help you take care of your own health. All people over age 72 should have a pneumovax  and and a prevnar shot to prevent pneumonia. These are once in a lifetime unless you and your provider decide differently. All people over 65 should have a yearly flu shot and a tetanus vaccine every 10 years. A bone mass density to screen for osteoporosis or thinning of the bones should be done every 2 years after 65. Screening for diabetes mellitus with a blood sugar test should be done every year.  
 
Glaucoma is a disease of the eye due to increased ocular pressure that can lead to blindness and it should be done every year by an eye professional. 
 
 Cardiovascular screening tests that check for elevated lipids (fatty part of blood) which can lead to heart disease and strokes should be done every 5 years. Colorectal screening that evaluates for blood or polyps in your colon should be done yearly as a stool test or every five years as a flexible sigmoidoscope or every 10 years as a colonoscopy up to age 76. Breast cancer screening with a mammogram is recommended biennially  for women age 54-69. Screening for cervical cancer with a pap smear and pelvic exam is recommended for women after age 72 years every 2 years up to age 79 or when the provider and patient decide to stop. If there is a history of cervical abnormalities or other increased risk for cancer then the test is recommended yearly. Hepatitis C screening is also recommended for anyone born between [birth year] through [address]. A shingles vaccine is also recommended once in a lifetime after age 61. Your Medicare Wellness Exam is recommended annually. Here is a list of your current Health Maintenance items with a due date: 
Health Maintenance Due Topic Date Due  
 DTaP/Tdap/Td  (1 - Tdap) 06/15/1952  Pneumococcal Vaccine (2 of 2 - PCV13) 01/02/2015 [facility] Annual Well Visit  03/14/2018 Introducing [facility]! [employer] introduces [employer] patient portal. Now you can access parts of your medical record, email your doctor's office, and request medication refills online. 1. In your internet browser, go to [email or web address] 2. Click on the First Time User? Click Here link in the Sign In box. You will see the New Member Sign Up page. 3. Enter your [employer] Access Code exactly as it appears below. You will not need to use this code after youve completed the sign-up process. If you do not sign up before the expiration date, you must request a new code. · [employer] Access Code: [email or web address] Expires: 8/8/2018 12:03 PM 
 
 4. Enter the last four digits of your Social Security Number (xxxx) and Date of Birth (mm/dd/yyyy) as indicated and click Submit. You will be taken to the next sign-up page. 5. Create a eFolder ID. This will be your eFolder login ID and cannot be changed, so think of one that is secure and easy to remember. 6. Create a eFolder password. You can change your password at any time. 7. Enter your Password Reset Question and Answer. This can be used at a later time if you forget your password. 8. Enter your e-mail address. You will receive e-mail notification when new information is available in [address] 9. Click Sign Up. You can now view and download portions of your medical record. 10. Click the Download Summary menu link to download a portable copy of your medical information. If you have questions, please visit the Frequently Asked Questions section of the eFolder website. Remember, eFolder is NOT to be used for urgent needs. For medical emergencies, dial 911. Now available from your iPhone and Android! Please provide this summary of care documentation to your next provider. Your primary care clinician is listed as Giselle Banegas. If you have any questions after today's visit, please call [phone].

## 2018-05-10 NOTE — PROGRESS NOTES
Subjective:      Dante Babcock is a 80 y.o. female who presents today for follow up of her hypertension, hyperlipidemia with CAD and elevated glucose. She is also present for her medicare wellness exam.  Her depression screening showed that she has been having some mild depression. She states that she is not motivated to do anything any more. She just wants to sit at home. She watched television. She is aware that she is not as active. She lives with her daughter, and her daughter is out often. They have only one car to share at this time. Even when her daughter asks her to go with her, she doesn't \"feel\" like it often. No thoughts of harming herself or others. She is also not sleeping as well. No appetite changes. Patient Active Problem List   Diagnosis Code    S/P CABG x 3 Z95.1    S/P colonoscopy Z98.890    HTN (hypertension) I10    Hyperlipidemia E78.5    Environmental allergies Z91.09    CAD (coronary artery disease) I25.10    Vitamin D deficiency E55.9    Advance directive discussed with patient Z70.80    Osteoporosis M81.0    H/O bone density study Z92.89     Current Outpatient Prescriptions   Medication Sig Dispense Refill    guaiFENesin (MUCINEX) 1,200 mg Ta12 ER tablet Take 1,200 mg by mouth two (2) times a day.  ZETIA 10 mg tablet TAKE ONE TABLET BY MOUTH DAILY 30 Tab 5    metoprolol tartrate (LOPRESSOR) 50 mg tablet TAKE ONE TABLET BY MOUTH TWICE A  Tab 1    amLODIPine-valsartan (EXFORGE)  mg per tablet Take 1 Tab by mouth daily. Need office visit for further refills 90 Tab 0    rosuvastatin (CRESTOR) 20 mg tablet Take 1 Tab by mouth nightly. 30 Tab 5    cholecalciferol, vitamin D3, (VITAMIN D3) 2,000 unit tab Take 1 tablet by mouth daily.  aspirin delayed-release 81 mg tablet Take 81 mg by mouth daily. Review of Systems    Pertinent items are noted in HPI.      Objective:     Visit Vitals    /78 (BP 1 Location: Left arm, BP Patient Position: Sitting)    Pulse 65    Temp 97.4 °F (36.3 °C) (Oral)    Resp 14    Ht 5' 1.5\" (1.562 m)    Wt 110 lb (49.9 kg)    SpO2 98%    BMI 20.45 kg/m2     General appearance: alert, cooperative, no distress, appears stated age  Head: Normocephalic, without obvious abnormality, atraumatic  Neck: supple, symmetrical, trachea midline, no adenopathy, no carotid bruit and no JVD  Lungs: clear to auscultation bilaterally  Heart: regular rate and rhythm, S1, S2 normal, no murmur, click, rub or gallop  Abdomen: soft, non-tender. Bowel sounds normal. No masses,  no organomegaly  Extremities: extremities normal, atraumatic, no cyanosis or edema  Pulses: 2+ and symmetric    Assessment/Plan:     1. Benign hypertension  -I evaluated and recommended to continue current doses of medications.     - METABOLIC PANEL, COMPREHENSIVE    2. Elevated glucose  -continue low sugar and carb diet. - METABOLIC PANEL, COMPREHENSIVE  - HEMOGLOBIN A1C WITH EAG    3. Mixed hyperlipidemia - with CAD  -I evaluated and recommended to continue current doses of medications.     - METABOLIC PANEL, COMPREHENSIVE    4. Encounter for long-term (current) use of medications    - METABOLIC PANEL, COMPREHENSIVE  - HEMOGLOBIN A1C WITH EAG    5. Dysthymia  -discussed with patient that her symptoms were likely due to mild depression. She agrees. -we decided that she would benefit from a low dose of an antidepressant to help her be herself again.  -trial of paxil 5mg nightly.   -follow up in 2 month    6. Medicare annual wellness visit, subsequent  -done today. - Annual  Alcohol Screen 15 min ()  - Depression Screen Annual    7. Screening for alcoholism    - Annual  Alcohol Screen 15 min ()    8. Screening for depression    - Depression Screen Annual     9. Health Care Maintenance    -due for shingrix and prevnar 13.  - scripts given to patient.      Orders Placed This Encounter    Depression Screen Annual    METABOLIC PANEL, COMPREHENSIVE    HEMOGLOBIN A1C WITH EAG    CKD REPORT    Annual  Alcohol Screen 15 min ()             varicella-zoster recombinant, PF, (SHINGRIX) 50 mcg/0.5 mL susr injection     Si.5 mL by IntraMUSCular route once for 1 dose. Repeat in 2-4 months     Dispense:  0.5 mL     Refill:  1    pneumococcal 13 kayla conj dip (PREVNAR-13) 0.5 mL syrg injection     Si.5 mL by IntraMUSCular route once for 1 dose. Dispense:  0.5 mL     Refill:  0    PARoxetine (PAXIL) 10 mg tablet     Sig: Take 0.5 Tabs by mouth nightly. Dispense:  30 Tab     Refill:  1      Follow-up Disposition:     Follow up in 2 month     Return if symptoms worsen or fail to improve. Advised patient to call back or return to office if symptoms worsen/change/persist.     Discussed expected course/resolution/complications of diagnosis in detail with patient. Medication risks/benefits/costs/interactions/alternatives discussed with patient. Patient was given an after visit summary which includes diagnoses, current medications, & vitals. Patient expressed understanding with the diagnosis and plan. This is the Subsequent Medicare Annual Wellness Exam, performed 12 months or more after the Initial AWV or the last Subsequent AWV    I have reviewed the patient's medical history in detail and updated the computerized patient record. History     Past Medical History:   Diagnosis Date    CAD (coronary artery disease)     Environmental allergies 2012    HTN (hypertension) 2012    Hypercholesteremia     Hyperlipidemia 2012    Hypertension     S/P CABG x 3 2012      Past Surgical History:   Procedure Laterality Date    CARDIAC SURG PROCEDURE UNLIST      bypass     Current Outpatient Prescriptions   Medication Sig Dispense Refill    guaiFENesin (MUCINEX) 1,200 mg Ta12 ER tablet Take 1,200 mg by mouth two (2) times a day.       ZETIA 10 mg tablet TAKE ONE TABLET BY MOUTH DAILY 30 Tab 5    metoprolol tartrate (LOPRESSOR) 50 mg tablet TAKE ONE TABLET BY MOUTH TWICE A  Tab 1    amLODIPine-valsartan (EXFORGE)  mg per tablet Take 1 Tab by mouth daily. Need office visit for further refills 90 Tab 0    rosuvastatin (CRESTOR) 20 mg tablet Take 1 Tab by mouth nightly. 30 Tab 5    cholecalciferol, vitamin D3, (VITAMIN D3) 2,000 unit tab Take 1 tablet by mouth daily.  aspirin delayed-release 81 mg tablet Take 81 mg by mouth daily. Allergies   Allergen Reactions    Aspirin Hives     Low dose aspirin is ok.  Pcn [Penicillins] Rash     Family History   Problem Relation Age of Onset    Heart Disease Mother     Hypertension Mother     Diabetes Father     Stroke Father     Cancer Brother 61     esophageal cancer     Social History   Substance Use Topics    Smoking status: Former Smoker     Packs/day: 0.25     Years: 20.00     Quit date: 1/1/1975    Smokeless tobacco: Never Used    Alcohol use No     Patient Active Problem List   Diagnosis Code    S/P CABG x 3 Z95.1    S/P colonoscopy Z98.890    HTN (hypertension) I10    Hyperlipidemia E78.5    Environmental allergies Z91.09    CAD (coronary artery disease) I25.10    Vitamin D deficiency E55.9    Advance directive discussed with patient Z70.80    Osteoporosis M81.0    H/O bone density study Z92.89       Depression Risk Factor Screening:     PHQ over the last two weeks 5/10/2018   Little interest or pleasure in doing things More than half the days   Feeling down, depressed or hopeless Several days   Total Score PHQ 2 3       Alcohol Risk Factor Screening: You do not drink alcohol or very rarely. Functional Ability and Level of Safety:   Hearing Loss  Hearing is good. Activities of Daily Living  The home contains: no safety equipment. Patient does total self care    Fall Risk  Fall Risk Assessment, last 12 mths 5/10/2018   Able to walk? Yes   Fall in past 12 months?  No       Abuse Screen  Patient is not abused    Cognitive Screening   Evaluation of Cognitive Function:  Has your family/caregiver stated any concerns about your memory: no  Normal    Patient Care Team   Patient Care Team:  Mac Razo MD as PCP - General (Internal Medicine)  Maximiano Goltz, MD as Physician (Ophthalmology)    Assessment/Plan   Education and counseling provided:  Are appropriate based on today's review and evaluation    Diagnoses and all orders for this visit:    1. Benign hypertension    2. Elevated glucose    3. Mixed hyperlipidemia    4. Encounter for long-term (current) use of medications    5. Medicare annual wellness visit, subsequent  -     Annual  Alcohol Screen 15 min ()  -     Depression Screen Annual    6. Screening for alcoholism  -     Annual  Alcohol Screen 15 min ()    7.  Screening for depression  -     Depression Screen Annual

## 2018-05-10 NOTE — PATIENT INSTRUCTIONS

## 2018-05-11 PROBLEM — F32.A MILD DEPRESSION: Status: ACTIVE | Noted: 2018-05-11

## 2018-05-11 LAB
ALBUMIN SERPL-MCNC: 4 G/DL (ref 3.5–4.7)
ALBUMIN/GLOB SERPL: 1.1 {RATIO} (ref 1.2–2.2)
ALP SERPL-CCNC: 81 IU/L (ref 39–117)
ALT SERPL-CCNC: 19 IU/L (ref 0–32)
AST SERPL-CCNC: 25 IU/L (ref 0–40)
BILIRUB SERPL-MCNC: 0.3 MG/DL (ref 0–1.2)
BUN SERPL-MCNC: 19 MG/DL (ref 8–27)
BUN/CREAT SERPL: 18 (ref 12–28)
CALCIUM SERPL-MCNC: 11.2 MG/DL (ref 8.7–10.3)
CHLORIDE SERPL-SCNC: 97 MMOL/L (ref 96–106)
CO2 SERPL-SCNC: 25 MMOL/L (ref 18–29)
CREAT SERPL-MCNC: 1.03 MG/DL (ref 0.57–1)
EST. AVERAGE GLUCOSE BLD GHB EST-MCNC: 137 MG/DL
GFR SERPLBLD CREATININE-BSD FMLA CKD-EPI: 49 ML/MIN/1.73
GFR SERPLBLD CREATININE-BSD FMLA CKD-EPI: 57 ML/MIN/1.73
GLOBULIN SER CALC-MCNC: 3.6 G/DL (ref 1.5–4.5)
GLUCOSE SERPL-MCNC: 122 MG/DL (ref 65–99)
HBA1C MFR BLD: 6.4 % (ref 4.8–5.6)
INTERPRETATION: NORMAL
POTASSIUM SERPL-SCNC: 4.1 MMOL/L (ref 3.5–5.2)
PROT SERPL-MCNC: 7.6 G/DL (ref 6–8.5)
SODIUM SERPL-SCNC: 136 MMOL/L (ref 134–144)

## 2018-07-05 ENCOUNTER — OFFICE VISIT (OUTPATIENT)
Dept: INTERNAL MEDICINE CLINIC | Age: 83
End: 2018-07-05

## 2018-07-05 VITALS
WEIGHT: 108 LBS | HEART RATE: 69 BPM | OXYGEN SATURATION: 95 % | BODY MASS INDEX: 19.88 KG/M2 | TEMPERATURE: 97.9 F | SYSTOLIC BLOOD PRESSURE: 140 MMHG | RESPIRATION RATE: 15 BRPM | DIASTOLIC BLOOD PRESSURE: 68 MMHG | HEIGHT: 62 IN

## 2018-07-05 DIAGNOSIS — H04.203 WATERY EYES: ICD-10-CM

## 2018-07-05 DIAGNOSIS — F34.1 DYSTHYMIA: Primary | ICD-10-CM

## 2018-07-05 NOTE — PROGRESS NOTES
Subjective:      Jesse Lim is a 80 y.o. female who presents today for follow up of her dysthymia. After our discussion, she had thought a trial of antidepressant may help, but she never picked it up because she reconsidered and didn't think she needed it. She got to thinking about how blessed her life is and realized that she did not need to feel sad. She lives with her daughter. She is having trouble with increased watering of her eyes. No trigger. No redness    Patient Active Problem List   Diagnosis Code    S/P CABG x 3 Z95.1    S/P colonoscopy Z98.890    HTN (hypertension) I10    Hyperlipidemia E78.5    Environmental allergies Z91.09    CAD (coronary artery disease) I25.10    Vitamin D deficiency E55.9    Advance directive discussed with patient Z70.80    Osteoporosis M81.0    H/O bone density study Z92.89    Mild depression ([facility] [address] F32.0     Current Outpatient Prescriptions   Medication Sig Dispense Refill    guaiFENesin (MUCINEX) 1,200 mg Ta12 ER tablet Take 1,200 mg by mouth two (2) times a day.  ZETIA 10 mg tablet TAKE ONE TABLET BY MOUTH DAILY 30 Tab 5    metoprolol tartrate (LOPRESSOR) 50 mg tablet TAKE ONE TABLET BY MOUTH TWICE A  Tab 1    amLODIPine-valsartan (EXFORGE)  mg per tablet Take 1 Tab by mouth daily. Need office visit for further refills 90 Tab 0    rosuvastatin (CRESTOR) 20 mg tablet Take 1 Tab by mouth nightly. 30 Tab 5    cholecalciferol, vitamin D3, (VITAMIN D3) 2,000 unit tab Take 1 tablet by mouth daily.  aspirin delayed-release 81 mg tablet Take 81 mg by mouth daily.  PARoxetine (PAXIL) 10 mg tablet Take 0.5 Tabs by mouth nightly. 30 Tab 1        Review of Systems    Pertinent items are noted in HPI.      Objective:     Visit Vitals    /68 (BP 1 Location: Left arm, BP Patient Position: Sitting)    Pulse 69    Temp 97.9 °F (36.6 °C) (Oral)    Resp 15    Ht 5' 1.5\" (1.562 m)    Wt 108 lb (49 kg)    SpO2 95%    BMI 20.08 kg/m2     General appearance: alert, cooperative, no distress, appears stated age  Head: Normocephalic, without obvious abnormality, atraumatic  Eyes:  Excessive watering both eyes. No erythema. PERRL  Lungs: clear to auscultation bilaterally  Heart: regular rate and rhythm, S1, S2 normal, no murmur, click, rub or gallop  Neurologic: Mental status: Alert, oriented, thought content appropriate, affect: stable and normal      Assessment/Plan:     1. Dysthymia  -patient states that she is doing well without any medications and would like to just continue to monitor without meds. 2. Watery eyes  -recommended evaluation by opthlalmologist.  Referred to [facility]. Follow-up Disposition:     Follow up in 6 months     Return if symptoms worsen or fail to improve. Advised patient to call back or return to office if symptoms worsen/change/persist.     Discussed expected course/resolution/complications of diagnosis in detail with patient. Medication risks/benefits/costs/interactions/alternatives discussed with patient. Patient was given an after visit summary which includes diagnoses, current medications, & vitals. Patient expressed understanding with the diagnosis and plan.

## 2018-07-05 NOTE — MR AVS SNAPSHOT
[address] [facility], [address] [place] [address] 
[phone] Patient: Anuj Blair MRN: [id number] KLA:[birth year]00 Visit Information Date & Time Provider Department Dept. Phone Encounter #  
 7/5/2018 11:00 AM MD Pati Arroyo [facility] [phone] Follow-up Instructions Return in about 6 months (around 1/5/2019). Upcoming Health Maintenance Date Due DTaP/Tdap/Td series (1 - Tdap) 6/15/1952 Pneumococcal 65+ Low/Medium Risk (2 of 2 - PCV13) 1/2/2015 Influenza Age 5 to Adult 8/1/2018 MEDICARE YEARLY EXAM 5/11/2019 GLAUCOMA SCREENING Q2Y 8/24/2019 Allergies as of 7/5/2018  Review Complete On: 7/5/2018 By: Lennie Campbell Severity Noted Reaction Type Reactions Aspirin  02/02/2012   Side Effect Hives Low dose aspirin is ok. Pcn [Penicillins]  01/06/2012    Rash Current Immunizations  Reviewed on 5/10/2018 Name Date Influenza High Dose Vaccine PF 11/10/2017 Influenza Vaccine 10/23/2015 12:46 PM, 10/23/2014, 11/1/2013 Pneumococcal Polysaccharide (PPSV-23) 1/2/2014 Not reviewed this visit You Were Diagnosed With   
  
 Codes Comments Dysthymia    -  Primary ICD-10-CM: F34.1 ICD-9-CM: 300.4 Watery eyes     ICD-10-CM: X53.644 ICD-9-CM: 375.20 Vitals BP Pulse Temp Resp Height(growth percentile) Weight(growth percentile) 140/68 (BP 1 Location: Left arm, BP Patient Position: Sitting) 69 97.9 °F (36.6 °C) (Oral) 15 5' 1.5\" (1.562 m) 108 lb (49 kg) SpO2 BMI OB Status Smoking Status 95% 20.08 kg/m2 Postmenopausal Former Smoker Vitals History BMI and BSA Data Body Mass Index Body Surface Area 20.08 kg/m 2 1.46 m 2 Preferred Pharmacy Pharmacy Name Phone [facility] [address] [place] [address] Dr Eddy, 8 [facility]. [phone] Your Updated Medication List  
  
   
 This list is accurate as of 7/5/18 11:33 AM.  Always use your most recent med list. amLODIPine-valsartan  mg per tablet Commonly known as:  Judeth Reins Take 1 Tab by mouth daily. Need office visit for further refills  
  
 aspirin delayed-release 81 mg tablet Take 81 mg by mouth daily. metoprolol tartrate 50 mg tablet Commonly known as:  LOPRESSOR  
TAKE ONE TABLET BY MOUTH TWICE A DAY  
  
 MUCINEX 1,200 mg Ta12 ER tablet Generic drug:  guaiFENesin Take 1,200 mg by mouth two (2) times a day. PARoxetine 10 mg tablet Commonly known as:  PAXIL Take 0.5 Tabs by mouth nightly. rosuvastatin 20 mg tablet Commonly known as:  CRESTOR Take 1 Tab by mouth nightly. VITAMIN D3 2,000 unit Tab Generic drug:  cholecalciferol (vitamin D3) Take 1 tablet by mouth daily. ZETIA 10 mg tablet Generic drug:  ezetimibe TAKE ONE TABLET BY MOUTH DAILY Follow-up Instructions Return in about 6 months (around 1/5/2019). Patient Instructions [facility] - [phone] Introducing [facility]! [address] [place] [address] introduces UXPin patient portal. Now you can access parts of your medical record, email your doctor's office, and request medication refills online. 1. In your internet browser, go to [email or web address] 2. Click on the First Time User? Click Here link in the Sign In box. You will see the New Member Sign Up page. 3. Enter your UXPin Access Code exactly as it appears below. You will not need to use this code after youve completed the sign-up process. If you do not sign up before the expiration date, you must request a new code. · UXPin Access Code: [email or web address] Expires: 8/8/2018 12:03 PM 
 
4. Enter the last four digits of your Social Security Number (xxxx) and Date of Birth (mm/dd/yyyy) as indicated and click Submit. You will be taken to the next sign-up page. 5. Create a SugarSync ID. This will be your SugarSync login ID and cannot be changed, so think of one that is secure and easy to remember. 6. Create a SugarSync password. You can change your password at any time. 7. Enter your Password Reset Question and Answer. This can be used at a later time if you forget your password. 8. Enter your e-mail address. You will receive e-mail notification when new information is available in [address] 9. Click Sign Up. You can now view and download portions of your medical record. 10. Click the Download Summary menu link to download a portable copy of your medical information. If you have questions, please visit the Frequently Asked Questions section of the SugarSync website. Remember, SugarSync is NOT to be used for urgent needs. For medical emergencies, dial 911. Now available from your iPhone and Android! Please provide this summary of care documentation to your next provider. Your primary care clinician is listed as Giselle Banegas. If you have any questions after today's visit, please call [phone].

## 2018-07-05 NOTE — PROGRESS NOTES
Patient's identity verified with two patient identifiers (name and date of birth). Reviewed record in preparation for visit and have obtained necessary documentation. 1. Have you been to the ER, urgent care clinic since your last visit? Hospitalized since your last visit? No  2. Have you seen or consulted any other health care providers outside of the [address] [place] [address] since your last visit? Include any pap smears or colon screening. No    Chief Complaint   Patient presents with    Depression     2 month follow up, never picked up Paxil, \"didn't want to\". Feels okay w/o it, and \"doesn't think she needs something\".  Watery Eyes     Intermittent x2wks, was on Zyrtec but no relief. Eye were watering so much patient looked like she was crying, but denies. States allergies but unsure what to take. Not fasting. Health Maintenance Due   Topic Date Due    DTaP/Tdap/Td series (1 - Tdap)  Has not had. 06/15/1952    Pneumococcal 65+ Low/Medium Risk (2 of 2 - PCV13)  Has not had second vaccine.  01/02/2015       Wt Readings from Last 3 Encounters:   07/05/18 108 lb (49 kg)   05/10/18 110 lb (49.9 kg)   11/10/17 113 lb (51.3 kg)     Temp Readings from Last 3 Encounters:   07/05/18 97.9 °F (36.6 °C) (Oral)   05/10/18 97.4 °F (36.3 °C) (Oral)   11/10/17 98.7 °F (37.1 °C) (Oral)     BP Readings from Last 3 Encounters:   07/05/18 140/68   05/10/18 144/78   11/10/17 145/72     Pulse Readings from Last 3 Encounters:   07/05/18 69   05/10/18 65   11/10/17 65

## 2019-01-07 ENCOUNTER — OFFICE VISIT (OUTPATIENT)
Dept: INTERNAL MEDICINE CLINIC | Age: 84
End: 2019-01-07

## 2019-01-07 VITALS
WEIGHT: 110.6 LBS | OXYGEN SATURATION: 94 % | BODY MASS INDEX: 20.35 KG/M2 | TEMPERATURE: 97.9 F | RESPIRATION RATE: 18 BRPM | SYSTOLIC BLOOD PRESSURE: 142 MMHG | HEIGHT: 62 IN | HEART RATE: 67 BPM | DIASTOLIC BLOOD PRESSURE: 68 MMHG

## 2019-01-07 DIAGNOSIS — I10 BENIGN HYPERTENSION: Primary | ICD-10-CM

## 2019-01-07 DIAGNOSIS — E78.2 MIXED HYPERLIPIDEMIA: ICD-10-CM

## 2019-01-07 DIAGNOSIS — R73.09 ELEVATED GLUCOSE: ICD-10-CM

## 2019-01-07 DIAGNOSIS — Z79.899 ENCOUNTER FOR LONG-TERM (CURRENT) USE OF MEDICATIONS: ICD-10-CM

## 2019-01-07 NOTE — PROGRESS NOTES
Reviewed record in preparation for visit and have obtained necessary documentation. Identified pt with two pt identifiers(name and ). Health Maintenance Due Topic  DTaP/Tdap/Td series (1 - Tdap)  Shingrix Vaccine Age 50> (1 of 2)  Pneumococcal 65+ Low/Medium Risk (2 of 2 - PCV13) Chief Complaint Patient presents with  Watery Eyes 6 mth f/u  Follow-up 6mth f/u Wt Readings from Last 3 Encounters:  
19 110 lb 9.6 oz (50.2 kg) 18 108 lb (49 kg) 05/10/18 110 lb (49.9 kg) Temp Readings from Last 3 Encounters:  
18 97.9 °F (36.6 °C) (Oral) 05/10/18 97.4 °F (36.3 °C) (Oral)  
11/10/17 98.7 °F (37.1 °C) (Oral) BP Readings from Last 3 Encounters:  
18 140/68  
05/10/18 144/78  
11/10/17 145/72 Pulse Readings from Last 3 Encounters:  
18 69  
05/10/18 65  
11/10/17 65 Learning Assessment: 
:  
 
Learning Assessment 2019 5/10/2018 2017 2015 2014 PRIMARY LEARNER Patient Patient Patient Patient Patient HIGHEST LEVEL OF EDUCATION - PRIMARY LEARNER  > 4 YEARS OF COLLEGE > 4 YEARS OF COLLEGE - > 4 YEARS OF COLLEGE > 4 YEARS OF COLLEGE  
BARRIERS PRIMARY LEARNER NONE NONE - NONE NONE  
CO-LEARNER CAREGIVER - No - No No  
PRIMARY LANGUAGE ENGLISH ENGLISH ENGLISH ENGLISH ENGLISH  NEED - - - No No  
LEARNER PREFERENCE PRIMARY DEMONSTRATION PICTURES DEMONSTRATION DEMONSTRATION OTHER (COMMENT)  
  - - - PICTURES -  
  - - - READING -  
LEARNING SPECIAL TOPICS - - - no no ANSWERED BY patient patient patient patient patient RELATIONSHIP SELF SELF SELF SELF SELF Depression Screening: 
:  
 
PHQ over the last two weeks 2019 Little interest or pleasure in doing things Several days Feeling down, depressed, irritable, or hopeless Several days Total Score PHQ 2 2 Fall Risk Assessment: 
:  
 
Fall Risk Assessment, last 12 mths 2019 Able to walk?  Yes  
 Fall in past 12 months? No  
 
 
Abuse Screening: 
:  
 
Abuse Screening Questionnaire 1/7/2019 5/10/2018 11/10/2017 4/23/2015 Do you ever feel afraid of your partner? N N N N Are you in a relationship with someone who physically or mentally threatens you? N N N N Is it safe for you to go home? Andreia Null Coordination of Care Questionnaire: 
:  
 
1) Have you been to an emergency room, urgent care clinic since your last visit? no  
Hospitalized since your last visit? no          
 
2) Have you seen or consulted any other health care providers outside of [address] [place] [address] since your last visit? no  (Include any pap smears or colon screenings in this section.) 3) Do you have an Advance Directive on file? no 
 
4) Are you interested in receiving information on Advance Directives? NO Patient is accompanied by self I have received verbal consent from Elizabeth Ross to discuss any/all medical information while they are present in the room.

## 2019-01-07 NOTE — PROGRESS NOTES
nd  
Subjective: Chucky Ling is a 80 y.o. female who presents today for follow up of her hyperlipidemia with CAD and hypertension and elevated glucose. She denies any chest pain, shortness of breath, syncope, headaches, nausea/vomiting, or any bowel changes. Patient Active Problem List  
Diagnosis Code  S/P CABG x 3 Z95.1  
 S/P colonoscopy Z98.890  
 HTN (hypertension) I10  
 Hyperlipidemia E78.5  Environmental allergies Z91.09  
 CAD (coronary artery disease) I25.10  Vitamin D deficiency E55.9  Advance directive discussed with patient Z70.80  
 Osteoporosis M81.0  
 H/O bone density study Z92.89  
 Mild depression (HCC) F32.0 Current Outpatient Medications Medication Sig Dispense Refill  diph,pertuss,acel,,tetanus vac,PF, (ADACEL,TDAP ADOLESN/ADULT,,PF,) 2 Lf-(2.5-5-3-5 mcg)-5Lf/0.5 mL syrg vaccine 0.5 mL by IntraMUSCular route once for 1 dose. 0.5 mL 0  
 pneumococcal 13 kayla conj dip (PREVNAR-13) 0.5 mL syrg injection 0.5 mL by IntraMUSCular route once for 1 dose. 0.5 mL 0  
 guaiFENesin (MUCINEX) 1,200 mg Ta12 ER tablet Take 1,200 mg by mouth two (2) times a day.  ZETIA 10 mg tablet TAKE ONE TABLET BY MOUTH DAILY 30 Tab 5  
 metoprolol tartrate (LOPRESSOR) 50 mg tablet TAKE ONE TABLET BY MOUTH TWICE A  Tab 1  
 amLODIPine-valsartan (EXFORGE)  mg per tablet Take 1 Tab by mouth daily. Need office visit for further refills 90 Tab 0  
 rosuvastatin (CRESTOR) 20 mg tablet Take 1 Tab by mouth nightly. 30 Tab 5  cholecalciferol, vitamin D3, (VITAMIN D3) 2,000 unit tab Take 1 tablet by mouth daily.  aspirin delayed-release 81 mg tablet Take 81 mg by mouth daily.  PARoxetine (PAXIL) 10 mg tablet Take 0.5 Tabs by mouth nightly. 30 Tab 1 Review of Systems Pertinent items are noted in HPI. Objective:  
 
Visit Vitals /68 (BP 1 Location: Left arm, BP Patient Position: Sitting) Pulse 67 Temp 97.9 °F (36.6 °C) (Oral) Resp 18 Ht 5' 1.5\" (1.562 m) Wt 110 lb 9.6 oz (50.2 kg) SpO2 94% BMI 20.56 kg/m² General appearance: alert, cooperative, no distress, appears stated age Head: Normocephalic, without obvious abnormality, atraumatic Neck: supple, symmetrical, trachea midline, no adenopathy, no carotid bruit and no JVD Lungs: clear to auscultation bilaterally Heart: regular rate and rhythm, systolic murmur: systolic ejection 1/6,  at 2nd left intercostal space Abdomen: soft, non-tender. Bowel sounds normal. No masses,  no organomegaly Extremities: extremities normal, atraumatic, no cyanosis or edema Pulses: 2+ and symmetric Assessment/Plan: 1. Benign hypertension 
-I evaluated and recommended to continue current doses of medications. - CBC WITH AUTOMATED DIFF 
- METABOLIC PANEL, COMPREHENSIVE 2. Elevated glucose 
-continue to watch diet. - METABOLIC PANEL, COMPREHENSIVE 
- HEMOGLOBIN A1C WITH EAG 3. Mixed hyperlipidemia 
-with CAD. No angina - METABOLIC PANEL, COMPREHENSIVE 
- LIPID PANEL 4. Encounter for long-term (current) use of medications 
 
- CBC WITH AUTOMATED DIFF 
- METABOLIC PANEL, COMPREHENSIVE 
- LIPID PANEL 
- HEMOGLOBIN A1C WITH EAG 5. Health Care Maintenance ' 
-recommended shingrix and prevnar 13. Scripts for both given to patient. Orders Placed This Encounter  CBC WITH AUTOMATED DIFF  
 METABOLIC PANEL, COMPREHENSIVE  LIPID PANEL  
 HEMOGLOBIN A1C WITH EAG  
 CVD REPORT  CKD REPORT  diph,pertuss,acel,,tetanus vac,PF, (ADACEL,TDAP ADOLESN/ADULT,,PF,) 2 Lf-(2.5-5-3-5 mcg)-5Lf/0.5 mL syrg vaccine Si.5 mL by IntraMUSCular route once for 1 dose. Dispense:  0.5 mL Refill:  0  
 pneumococcal 13 kayla conj dip (PREVNAR-13) 0.5 mL syrg injection Si.5 mL by IntraMUSCular route once for 1 dose. Dispense:  0.5 mL Refill:  0 Follow-up Disposition:  
 
 
 
Return if symptoms worsen or fail to improve. Advised patient to call back or return to office if symptoms worsen/change/persist.  
 
Discussed expected course/resolution/complications of diagnosis in detail with patient. Medication risks/benefits/costs/interactions/alternatives discussed with patient. Patient was given an after visit summary which includes diagnoses, current medications, & vitals. Patient expressed understanding with the diagnosis and plan.

## 2019-01-08 LAB
ALBUMIN SERPL-MCNC: 4.3 G/DL (ref 3.5–4.7)
ALBUMIN/GLOB SERPL: 1.3 {RATIO} (ref 1.2–2.2)
ALP SERPL-CCNC: 89 IU/L (ref 39–117)
ALT SERPL-CCNC: 14 IU/L (ref 0–32)
AST SERPL-CCNC: 20 IU/L (ref 0–40)
BASOPHILS # BLD AUTO: 0 X10E3/UL (ref 0–0.2)
BASOPHILS NFR BLD AUTO: 0 %
BILIRUB SERPL-MCNC: 0.4 MG/DL (ref 0–1.2)
BUN SERPL-MCNC: 20 MG/DL (ref 8–27)
BUN/CREAT SERPL: 20 (ref 12–28)
CALCIUM SERPL-MCNC: 11.1 MG/DL (ref 8.7–10.3)
CHLORIDE SERPL-SCNC: 103 MMOL/L (ref 96–106)
CHOLEST SERPL-MCNC: 138 MG/DL (ref 100–199)
CO2 SERPL-SCNC: 23 MMOL/L (ref 20–29)
CREAT SERPL-MCNC: 1 MG/DL (ref 0.57–1)
EOSINOPHIL # BLD AUTO: 0.1 X10E3/UL (ref 0–0.4)
EOSINOPHIL NFR BLD AUTO: 2 %
ERYTHROCYTE [DISTWIDTH] IN BLOOD BY AUTOMATED COUNT: 13.1 % (ref 12.3–15.4)
EST. AVERAGE GLUCOSE BLD GHB EST-MCNC: 137 MG/DL
GLOBULIN SER CALC-MCNC: 3.2 G/DL (ref 1.5–4.5)
GLUCOSE SERPL-MCNC: 133 MG/DL (ref 65–99)
HBA1C MFR BLD: 6.4 % (ref 4.8–5.6)
HCT VFR BLD AUTO: 36.2 % (ref 34–46.6)
HDLC SERPL-MCNC: 64 MG/DL
HGB BLD-MCNC: 11.8 G/DL (ref 11.1–15.9)
IMM GRANULOCYTES # BLD AUTO: 0 X10E3/UL (ref 0–0.1)
IMM GRANULOCYTES NFR BLD AUTO: 0 %
INTERPRETATION, 910389: NORMAL
INTERPRETATION: NORMAL
LDLC SERPL CALC-MCNC: 64 MG/DL (ref 0–99)
LYMPHOCYTES # BLD AUTO: 1.4 X10E3/UL (ref 0.7–3.1)
LYMPHOCYTES NFR BLD AUTO: 25 %
MCH RBC QN AUTO: 29.6 PG (ref 26.6–33)
MCHC RBC AUTO-ENTMCNC: 32.6 G/DL (ref 31.5–35.7)
MCV RBC AUTO: 91 FL (ref 79–97)
MONOCYTES # BLD AUTO: 0.4 X10E3/UL (ref 0.1–0.9)
MONOCYTES NFR BLD AUTO: 8 %
NEUTROPHILS # BLD AUTO: 3.5 X10E3/UL (ref 1.4–7)
NEUTROPHILS NFR BLD AUTO: 65 %
PDF IMAGE, 910387: NORMAL
PLATELET # BLD AUTO: 207 X10E3/UL (ref 150–379)
POTASSIUM SERPL-SCNC: 4.2 MMOL/L (ref 3.5–5.2)
PROT SERPL-MCNC: 7.5 G/DL (ref 6–8.5)
RBC # BLD AUTO: 3.98 X10E6/UL (ref 3.77–5.28)
SODIUM SERPL-SCNC: 139 MMOL/L (ref 134–144)
TRIGL SERPL-MCNC: 49 MG/DL (ref 0–149)
VLDLC SERPL CALC-MCNC: 10 MG/DL (ref 5–40)
WBC # BLD AUTO: 5.4 X10E3/UL (ref 3.4–10.8)

## 2019-01-14 ENCOUNTER — HOSPITAL ENCOUNTER (INPATIENT)
Age: 84
LOS: 1 days | Discharge: HOME | DRG: 247 | End: 2019-01-16
Attending: EMERGENCY MEDICINE | Admitting: INTERNAL MEDICINE
Payer: MEDICARE

## 2019-01-14 ENCOUNTER — APPOINTMENT (OUTPATIENT)
Dept: GENERAL RADIOLOGY | Age: 84
DRG: 247 | End: 2019-01-14
Attending: EMERGENCY MEDICINE
Payer: MEDICARE

## 2019-01-14 DIAGNOSIS — R07.9 CHEST PAIN, UNSPECIFIED TYPE: Primary | ICD-10-CM

## 2019-01-14 PROBLEM — I20.0 UNSTABLE ANGINA (HCC): Status: ACTIVE | Noted: 2019-01-14

## 2019-01-14 LAB
ANION GAP SERPL CALC-SCNC: 5 MMOL/L (ref 5–15)
ATRIAL RATE: 77 BPM
BASOPHILS # BLD: 0 K/UL (ref 0–0.1)
BASOPHILS NFR BLD: 0 % (ref 0–1)
BUN SERPL-MCNC: 24 MG/DL (ref 6–20)
BUN/CREAT SERPL: 21 (ref 12–20)
CALCIUM SERPL-MCNC: 11.6 MG/DL (ref 8.5–10.1)
CALCULATED P AXIS, ECG09: 81 DEGREES
CALCULATED R AXIS, ECG10: 6 DEGREES
CALCULATED T AXIS, ECG11: 73 DEGREES
CHLORIDE SERPL-SCNC: 106 MMOL/L (ref 97–108)
CK SERPL-CCNC: 111 U/L (ref 26–192)
CO2 SERPL-SCNC: 27 MMOL/L (ref 21–32)
COMMENT, HOLDF: NORMAL
CREAT SERPL-MCNC: 1.13 MG/DL (ref 0.55–1.02)
DIAGNOSIS, 93000: NORMAL
DIFFERENTIAL METHOD BLD: NORMAL
EOSINOPHIL # BLD: 0.1 K/UL (ref 0–0.4)
EOSINOPHIL NFR BLD: 1 % (ref 0–7)
ERYTHROCYTE [DISTWIDTH] IN BLOOD BY AUTOMATED COUNT: 12 % (ref 11.5–14.5)
GLUCOSE SERPL-MCNC: 139 MG/DL (ref 65–100)
HCT VFR BLD AUTO: 42.4 % (ref 35–47)
HGB BLD-MCNC: 13.2 G/DL (ref 11.5–16)
IMM GRANULOCYTES # BLD AUTO: 0 K/UL (ref 0–0.04)
IMM GRANULOCYTES NFR BLD AUTO: 0 % (ref 0–0.5)
LYMPHOCYTES # BLD: 1.7 K/UL (ref 0.8–3.5)
LYMPHOCYTES NFR BLD: 23 % (ref 12–49)
MCH RBC QN AUTO: 29.3 PG (ref 26–34)
MCHC RBC AUTO-ENTMCNC: 31.1 G/DL (ref 30–36.5)
MCV RBC AUTO: 94.2 FL (ref 80–99)
MONOCYTES # BLD: 0.6 K/UL (ref 0–1)
MONOCYTES NFR BLD: 8 % (ref 5–13)
NEUTS SEG # BLD: 5.2 K/UL (ref 1.8–8)
NEUTS SEG NFR BLD: 68 % (ref 32–75)
NRBC # BLD: 0 K/UL (ref 0–0.01)
NRBC BLD-RTO: 0 PER 100 WBC
P-R INTERVAL, ECG05: 164 MS
PLATELET # BLD AUTO: 209 K/UL (ref 150–400)
PMV BLD AUTO: 10.6 FL (ref 8.9–12.9)
POTASSIUM SERPL-SCNC: 4.4 MMOL/L (ref 3.5–5.1)
Q-T INTERVAL, ECG07: 352 MS
QRS DURATION, ECG06: 78 MS
QTC CALCULATION (BEZET), ECG08: 398 MS
RBC # BLD AUTO: 4.5 M/UL (ref 3.8–5.2)
SAMPLES BEING HELD,HOLD: NORMAL
SODIUM SERPL-SCNC: 138 MMOL/L (ref 136–145)
TROPONIN I SERPL-MCNC: <0.05 NG/ML
VENTRICULAR RATE, ECG03: 77 BPM
WBC # BLD AUTO: 7.7 K/UL (ref 3.6–11)

## 2019-01-14 PROCEDURE — 85025 COMPLETE CBC W/AUTO DIFF WBC: CPT

## 2019-01-14 PROCEDURE — 99218 HC RM OBSERVATION: CPT

## 2019-01-14 PROCEDURE — 36415 COLL VENOUS BLD VENIPUNCTURE: CPT

## 2019-01-14 PROCEDURE — 93005 ELECTROCARDIOGRAM TRACING: CPT

## 2019-01-14 PROCEDURE — 94762 N-INVAS EAR/PLS OXIMTRY CONT: CPT

## 2019-01-14 PROCEDURE — 84484 ASSAY OF TROPONIN QUANT: CPT

## 2019-01-14 PROCEDURE — 99284 EMERGENCY DEPT VISIT MOD MDM: CPT

## 2019-01-14 PROCEDURE — 74011250637 HC RX REV CODE- 250/637: Performed by: INTERNAL MEDICINE

## 2019-01-14 PROCEDURE — 80048 BASIC METABOLIC PNL TOTAL CA: CPT

## 2019-01-14 PROCEDURE — 71046 X-RAY EXAM CHEST 2 VIEWS: CPT

## 2019-01-14 PROCEDURE — 74011250636 HC RX REV CODE- 250/636: Performed by: INTERNAL MEDICINE

## 2019-01-14 PROCEDURE — 82550 ASSAY OF CK (CPK): CPT

## 2019-01-14 RX ORDER — METOPROLOL TARTRATE 50 MG/1
50 TABLET ORAL 2 TIMES DAILY
COMMUNITY
End: 2019-01-22 | Stop reason: SDUPTHER

## 2019-01-14 RX ORDER — PAROXETINE 10 MG/1
5 TABLET, FILM COATED ORAL
Status: CANCELLED | OUTPATIENT
Start: 2019-01-14

## 2019-01-14 RX ORDER — IRBESARTAN 300 MG/1
300 TABLET ORAL
COMMUNITY

## 2019-01-14 RX ORDER — METOPROLOL TARTRATE 50 MG/1
50 TABLET ORAL 2 TIMES DAILY
Status: DISCONTINUED | OUTPATIENT
Start: 2019-01-14 | End: 2019-01-16 | Stop reason: HOSPADM

## 2019-01-14 RX ORDER — SODIUM CHLORIDE 0.9 % (FLUSH) 0.9 %
5-40 SYRINGE (ML) INJECTION EVERY 8 HOURS
Status: DISCONTINUED | OUTPATIENT
Start: 2019-01-14 | End: 2019-01-15 | Stop reason: HOSPADM

## 2019-01-14 RX ORDER — ROSUVASTATIN CALCIUM 10 MG/1
20 TABLET, COATED ORAL
Status: DISCONTINUED | OUTPATIENT
Start: 2019-01-14 | End: 2019-01-16 | Stop reason: HOSPADM

## 2019-01-14 RX ORDER — LOSARTAN POTASSIUM 50 MG/1
100 TABLET ORAL
Status: DISCONTINUED | OUTPATIENT
Start: 2019-01-14 | End: 2019-01-16 | Stop reason: HOSPADM

## 2019-01-14 RX ORDER — AMLODIPINE BESYLATE 10 MG/1
10 TABLET ORAL
COMMUNITY

## 2019-01-14 RX ORDER — ASPIRIN 81 MG/1
81 TABLET ORAL DAILY
COMMUNITY

## 2019-01-14 RX ORDER — CHOLECALCIFEROL (VITAMIN D3) 125 MCG
1 CAPSULE ORAL DAILY
Status: CANCELLED | OUTPATIENT
Start: 2019-01-15

## 2019-01-14 RX ORDER — SODIUM CHLORIDE 9 MG/ML
75 INJECTION, SOLUTION INTRAVENOUS CONTINUOUS
Status: DISCONTINUED | OUTPATIENT
Start: 2019-01-14 | End: 2019-01-15

## 2019-01-14 RX ORDER — EZETIMIBE 10 MG/1
10 TABLET ORAL
Status: DISCONTINUED | OUTPATIENT
Start: 2019-01-14 | End: 2019-01-16 | Stop reason: HOSPADM

## 2019-01-14 RX ORDER — NITROGLYCERIN 0.4 MG/1
0.4 TABLET SUBLINGUAL
Status: DISCONTINUED | OUTPATIENT
Start: 2019-01-14 | End: 2019-01-15

## 2019-01-14 RX ORDER — AMLODIPINE BESYLATE 5 MG/1
10 TABLET ORAL
Status: DISCONTINUED | OUTPATIENT
Start: 2019-01-14 | End: 2019-01-16 | Stop reason: HOSPADM

## 2019-01-14 RX ORDER — CETIRIZINE HCL 10 MG
10 TABLET ORAL
COMMUNITY

## 2019-01-14 RX ORDER — ACETAMINOPHEN 325 MG/1
650 TABLET ORAL
Status: DISCONTINUED | OUTPATIENT
Start: 2019-01-14 | End: 2019-01-15 | Stop reason: HOSPADM

## 2019-01-14 RX ORDER — SODIUM CHLORIDE 0.9 % (FLUSH) 0.9 %
5-40 SYRINGE (ML) INJECTION AS NEEDED
Status: DISCONTINUED | OUTPATIENT
Start: 2019-01-14 | End: 2019-01-15

## 2019-01-14 RX ORDER — EZETIMIBE 10 MG/1
10 TABLET ORAL
COMMUNITY
End: 2019-01-22 | Stop reason: SDUPTHER

## 2019-01-14 RX ORDER — ENOXAPARIN SODIUM 100 MG/ML
50 INJECTION SUBCUTANEOUS EVERY 24 HOURS
Status: DISCONTINUED | OUTPATIENT
Start: 2019-01-14 | End: 2019-01-15

## 2019-01-14 RX ORDER — ROSUVASTATIN CALCIUM 20 MG/1
20 TABLET, COATED ORAL DAILY
COMMUNITY
End: 2019-01-22 | Stop reason: SDUPTHER

## 2019-01-14 RX ORDER — LANOLIN ALCOHOL/MO/W.PET/CERES
100 CREAM (GRAM) TOPICAL DAILY
COMMUNITY

## 2019-01-14 RX ADMIN — Medication 10 ML: at 22:19

## 2019-01-14 RX ADMIN — LOSARTAN POTASSIUM 100 MG: 50 TABLET ORAL at 22:05

## 2019-01-14 RX ADMIN — AMLODIPINE BESYLATE 10 MG: 5 TABLET ORAL at 22:05

## 2019-01-14 RX ADMIN — EZETIMIBE 10 MG: 10 TABLET ORAL at 22:06

## 2019-01-14 RX ADMIN — SODIUM CHLORIDE 75 ML/HR: 900 INJECTION, SOLUTION INTRAVENOUS at 22:06

## 2019-01-14 RX ADMIN — ENOXAPARIN SODIUM 50 MG: 60 INJECTION SUBCUTANEOUS at 22:36

## 2019-01-14 RX ADMIN — ROSUVASTATIN CALCIUM 20 MG: 10 TABLET, FILM COATED ORAL at 22:06

## 2019-01-14 RX ADMIN — METOPROLOL TARTRATE 50 MG: 50 TABLET ORAL at 22:06

## 2019-01-14 NOTE — ED PROVIDER NOTES
80-year-old [facility] American female presents to the emergency department with chest pain. Patient reports she's had a history of MI. She sees Dr. Juan Jose Roach for cardiology. She reports that yesterday she was having intermittent chest pain throughout the day. She says she had it several times. The pain would last for a few minutes and then go away on its own. She could not think of anything that made the pain better or worse. Pain was on the left side of her chest and did not radiate. No shortness of breath, vomiting, or sweating. This morning she had similar pain around 9:00. It again lasted for several minutes. No chest pain currently. Patient denies abdominal pain. No cough. No fevers. No dysuria or hematuria. No tobacco or alcohol use. Past Medical History:  
Diagnosis Date  CAD (coronary artery disease)  Environmental allergies 2/2/2012  
 HTN (hypertension) 2/2/2012  Hypercholesteremia  Hyperlipidemia 2/2/2012  Hypertension  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 Past Surgical History:  
Procedure Laterality Date  CARDIAC SURG PROCEDURE UNLIST    
 bypass Family History:  
Problem Relation Age of Onset  Heart Disease Mother  Hypertension Mother  Diabetes Father  Stroke Father  Cancer Brother 61  
     esophageal cancer Social History Socioeconomic History  Marital status:  Spouse name: Not on file  Number of children: Not on file  Years of education: Not on file  Highest education level: Not on file Social Needs  Financial resource strain: Not on file  Food insecurity - worry: Not on file  Food insecurity - inability: Not on file  Transportation needs - medical: Not on file  Transportation needs - non-medical: Not on file Occupational History  Not on file Tobacco Use  Smoking status: Former Smoker Packs/day: 0.25 Years: 20.00 Pack years: 5.00 Last attempt to quit: 1/1/1975 Years since quittin.0  Smokeless tobacco: Never Used Substance and Sexual Activity  Alcohol use: No  
 Drug use: No  
 Sexual activity: No  
Other Topics Concern  Not on file Social History Narrative  Not on file ALLERGIES: Aspirin and Pcn [penicillins] Review of Systems Constitutional: Negative for fever. HENT: Negative for congestion. Eyes: Negative for pain and redness. Respiratory: Negative for cough, chest tightness and shortness of breath. Cardiovascular: Positive for chest pain. Negative for leg swelling. Gastrointestinal: Negative for abdominal distention, abdominal pain and vomiting. Endocrine: Negative for polyuria. Genitourinary: Negative for difficulty urinating and flank pain. Musculoskeletal: Negative for arthralgias and back pain. Skin: Negative for color change. Allergic/Immunologic: Negative for immunocompromised state. Neurological: Negative for dizziness and headaches. Hematological: Does not bruise/bleed easily. Psychiatric/Behavioral: Negative for agitation. All other systems reviewed and are negative. Vitals:  
 19 1438 19 1447 Pulse: 77 Temp:  98.3 °F (36.8 °C) SpO2: 99% Physical Exam  
Constitutional: She is oriented to person, place, and time. She appears well-developed and well-nourished. HENT:  
Head: Normocephalic and atraumatic. Right Ear: External ear normal.  
Left Ear: External ear normal.  
Nose: Nose normal.  
Mouth/Throat: Oropharynx is clear and moist.  
Eyes: EOM are normal. Pupils are equal, round, and reactive to light. No scleral icterus. Neck: Normal range of motion. Neck supple. No JVD present. No tracheal deviation present. No thyromegaly present. Cardiovascular: Normal rate, regular rhythm, normal heart sounds and intact distal pulses. Exam reveals no friction rub. No murmur heard. Pulmonary/Chest: Effort normal and breath sounds normal. No stridor.  No respiratory distress. She has no wheezes. She has no rales. She exhibits no tenderness. Abdominal: Soft. Bowel sounds are normal. She exhibits no distension. There is no tenderness. There is no rebound and no guarding. Musculoskeletal: Normal range of motion. She exhibits no edema or tenderness. Lymphadenopathy:  
  She has no cervical adenopathy. Neurological: She is alert and oriented to person, place, and time. She has normal reflexes. No cranial nerve deficit. Coordination normal.  
Skin: Skin is warm and dry. No rash noted. No erythema. Psychiatric: She has a normal mood and affect. Her behavior is normal. Judgment and thought content normal.  
Nursing note and vitals reviewed. MDM Number of Diagnoses or Management Options Diagnosis management comments: Patient has intermittent vague chest pain. Will check basic blood work. Her chest x-ray. Will discuss with cardiology shortly when testing his back. Patient agrees. Amount and/or Complexity of Data Reviewed Clinical lab tests: reviewed and ordered Tests in the radiology section of CPT®: ordered and reviewed Tests in the medicine section of CPT®: ordered and reviewed Discussion of test results with the performing providers: yes Decide to obtain previous medical records or to obtain history from someone other than the patient: yes Obtain history from someone other than the patient: yes Review and summarize past medical records: yes Discuss the patient with other providers: yes Independent visualization of images, tracings, or specimens: yes Risk of Complications, Morbidity, and/or Mortality Presenting problems: high Diagnostic procedures: high Management options: high Procedures EKG: NSR, HR 77, normal axis, normal intervals, no ST elevations or depressions, impression is normal EKG CXR shows no acute process Trop is negative Pt is CP free now.  Pt is unclear on any previous heart procedures, but says she had an MI. Will talk to cards now. Pt sees Dr Zak Horn. 4:33 PM 
Dr Britt Oppenheim from cardiology is here in ED to see pt now 4:42 PM 
Dr Britt Oppenheim will admit

## 2019-01-14 NOTE — ED TRIAGE NOTES
TRIAGE: Pt arrives from home with c/o intermittent sharp right-sided chest pain that that started at rest yesterday. Reports pain does not radiate. Denies nausea and SOB. Hx of MI and sees Dr. Zak Horn. A&Ox4 with VSS.

## 2019-01-14 NOTE — PROGRESS NOTES
Admission Medication Reconciliation: 
 
Information obtained from: This medication history was obtained from the patient and her outpatient pharmacy: [facility] @ [phone]. The patient brought a list of medications to the hospital and did not contain doses. I confirmed the doses with her pharmacist at [facility]. An RX Query is not available for the majority of her medications. Summary:  
 
Medications added: amlodipine, irbesartan, thiamine, cetirizine Medications deleted: amlodipine-valsartan, cholecalciferol, guaifenesin, paroxetine Dose changes: none Chief Complaint for this Admission:  unstable angina Significant PMH/Disease States:  
Past Medical History:  
Diagnosis Date  CAD (coronary artery disease)  Environmental allergies 2/2/2012  
 HTN (hypertension) 2/2/2012  Hypercholesteremia  Hyperlipidemia 2/2/2012  Hypertension  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 Allergies:  Aspirin and Pcn [penicillins] Prior to Admission Medications:  
Prior to Admission Medications Prescriptions Last Dose Informant Patient Reported? Taking? amLODIPine (NORVASC) 10 mg tablet 1/13/2019 at hs  Yes Yes Sig: Take 10 mg by mouth nightly. aspirin delayed-release 81 mg tablet 1/14/2019 at am  Yes Yes Sig: Take 81 mg by mouth daily. cetirizine (ZYRTEC) 10 mg tablet   Yes Yes Sig: Take 10 mg by mouth daily as needed for Allergies. ezetimibe (ZETIA) 10 mg tablet 1/13/2019 at hs  Yes Yes Sig: Take 10 mg by mouth nightly. irbesartan (AVAPRO) 300 mg tablet 1/13/2019 at hs  Yes Yes Sig: Take 300 mg by mouth nightly. metoprolol tartrate (LOPRESSOR) 50 mg tablet 1/14/2019 at am  Yes Yes Sig: Take 50 mg by mouth two (2) times a day. rosuvastatin (CRESTOR) 20 mg tablet 1/14/2019 at am  Yes Yes Sig: Take 20 mg by mouth daily. thiamine HCL (B-1) 100 mg tablet 1/14/2019 at am  Yes Yes Sig: Take 100 mg by mouth daily. Facility-Administered Medications: None Thank you for allowing me to participate in the care of this patient. Please contact the pharmacy () or the medication reconciliation pharmacist () with any questions. Frederic Ellington, Pharm. D., BCPS, BCPPS

## 2019-01-15 LAB
ANION GAP SERPL CALC-SCNC: 6 MMOL/L (ref 5–15)
BUN SERPL-MCNC: 20 MG/DL (ref 6–20)
BUN/CREAT SERPL: 26 (ref 12–20)
CALCIUM SERPL-MCNC: 10.6 MG/DL (ref 8.5–10.1)
CHLORIDE SERPL-SCNC: 108 MMOL/L (ref 97–108)
CO2 SERPL-SCNC: 26 MMOL/L (ref 21–32)
CREAT SERPL-MCNC: 0.76 MG/DL (ref 0.55–1.02)
GLUCOSE SERPL-MCNC: 87 MG/DL (ref 65–100)
POTASSIUM SERPL-SCNC: 3.9 MMOL/L (ref 3.5–5.1)
SODIUM SERPL-SCNC: 140 MMOL/L (ref 136–145)
TROPONIN I SERPL-MCNC: <0.05 NG/ML

## 2019-01-15 PROCEDURE — 74011250636 HC RX REV CODE- 250/636: Performed by: INTERNAL MEDICINE

## 2019-01-15 PROCEDURE — 77030013715 HC INFL SYS MRTM -B

## 2019-01-15 PROCEDURE — 027136Z DILATION OF CORONARY ARTERY, TWO ARTERIES WITH THREE DRUG-ELUTING INTRALUMINAL DEVICES, PERCUTANEOUS APPROACH: ICD-10-PCS | Performed by: INTERNAL MEDICINE

## 2019-01-15 PROCEDURE — B2151ZZ FLUOROSCOPY OF LEFT HEART USING LOW OSMOLAR CONTRAST: ICD-10-PCS | Performed by: INTERNAL MEDICINE

## 2019-01-15 PROCEDURE — 74011250637 HC RX REV CODE- 250/637: Performed by: INTERNAL MEDICINE

## 2019-01-15 PROCEDURE — B2131ZZ FLUOROSCOPY OF MULTIPLE CORONARY ARTERY BYPASS GRAFTS USING LOW OSMOLAR CONTRAST: ICD-10-PCS | Performed by: INTERNAL MEDICINE

## 2019-01-15 PROCEDURE — 99218 HC RM OBSERVATION: CPT

## 2019-01-15 PROCEDURE — 4A023N7 MEASUREMENT OF CARDIAC SAMPLING AND PRESSURE, LEFT HEART, PERCUTANEOUS APPROACH: ICD-10-PCS | Performed by: INTERNAL MEDICINE

## 2019-01-15 PROCEDURE — 93005 ELECTROCARDIOGRAM TRACING: CPT

## 2019-01-15 PROCEDURE — C1760 CLOSURE DEV, VASC: HCPCS

## 2019-01-15 PROCEDURE — C1887 CATHETER, GUIDING: HCPCS

## 2019-01-15 PROCEDURE — C1874 STENT, COATED/COV W/DEL SYS: HCPCS

## 2019-01-15 PROCEDURE — 74011636320 HC RX REV CODE- 636/320: Performed by: INTERNAL MEDICINE

## 2019-01-15 PROCEDURE — 77030004533 HC CATH ANGI DX IMP BSC -B

## 2019-01-15 PROCEDURE — 92928 PRQ TCAT PLMT NTRAC ST 1 LES: CPT

## 2019-01-15 PROCEDURE — 65660000000 HC RM CCU STEPDOWN

## 2019-01-15 PROCEDURE — 84484 ASSAY OF TROPONIN QUANT: CPT

## 2019-01-15 PROCEDURE — 74011000258 HC RX REV CODE- 258: Performed by: INTERNAL MEDICINE

## 2019-01-15 PROCEDURE — 74011250636 HC RX REV CODE- 250/636

## 2019-01-15 PROCEDURE — C1894 INTRO/SHEATH, NON-LASER: HCPCS

## 2019-01-15 PROCEDURE — 80048 BASIC METABOLIC PNL TOTAL CA: CPT

## 2019-01-15 PROCEDURE — C1769 GUIDE WIRE: HCPCS

## 2019-01-15 PROCEDURE — 77030013744

## 2019-01-15 PROCEDURE — B2111ZZ FLUOROSCOPY OF MULTIPLE CORONARY ARTERIES USING LOW OSMOLAR CONTRAST: ICD-10-PCS | Performed by: INTERNAL MEDICINE

## 2019-01-15 PROCEDURE — C1725 CATH, TRANSLUMIN NON-LASER: HCPCS

## 2019-01-15 PROCEDURE — 77030004532 HC CATH ANGI DX IMP BSC -A

## 2019-01-15 PROCEDURE — 36415 COLL VENOUS BLD VENIPUNCTURE: CPT

## 2019-01-15 RX ORDER — SODIUM CHLORIDE 9 MG/ML
1.5 INJECTION, SOLUTION INTRAVENOUS CONTINUOUS
Status: DISCONTINUED | OUTPATIENT
Start: 2019-01-15 | End: 2019-01-15 | Stop reason: HOSPADM

## 2019-01-15 RX ORDER — LIDOCAINE HYDROCHLORIDE 10 MG/ML
INJECTION INFILTRATION; PERINEURAL
Status: COMPLETED
Start: 2019-01-15 | End: 2019-01-15

## 2019-01-15 RX ORDER — CLOPIDOGREL BISULFATE 75 MG/1
75 TABLET ORAL DAILY
Status: DISCONTINUED | OUTPATIENT
Start: 2019-01-16 | End: 2019-01-16 | Stop reason: HOSPADM

## 2019-01-15 RX ORDER — MIDAZOLAM HYDROCHLORIDE 1 MG/ML
.5-5 INJECTION, SOLUTION INTRAMUSCULAR; INTRAVENOUS
Status: DISCONTINUED | OUTPATIENT
Start: 2019-01-15 | End: 2019-01-15

## 2019-01-15 RX ORDER — SODIUM CHLORIDE 0.9 % (FLUSH) 0.9 %
5-40 SYRINGE (ML) INJECTION AS NEEDED
Status: DISCONTINUED | OUTPATIENT
Start: 2019-01-15 | End: 2019-01-16 | Stop reason: HOSPADM

## 2019-01-15 RX ORDER — CLOPIDOGREL 300 MG/1
300-600 TABLET, FILM COATED ORAL
Status: COMPLETED | OUTPATIENT
Start: 2019-01-15 | End: 2019-01-15

## 2019-01-15 RX ORDER — FENTANYL CITRATE 50 UG/ML
25-100 INJECTION, SOLUTION INTRAMUSCULAR; INTRAVENOUS
Status: DISCONTINUED | OUTPATIENT
Start: 2019-01-15 | End: 2019-01-15

## 2019-01-15 RX ORDER — LIDOCAINE HYDROCHLORIDE 10 MG/ML
10 INJECTION INFILTRATION; PERINEURAL
Status: DISCONTINUED | OUTPATIENT
Start: 2019-01-15 | End: 2019-01-15

## 2019-01-15 RX ORDER — ATROPINE SULFATE 0.1 MG/ML
1 INJECTION INTRAVENOUS AS NEEDED
Status: DISCONTINUED | OUTPATIENT
Start: 2019-01-15 | End: 2019-01-15

## 2019-01-15 RX ORDER — SODIUM CHLORIDE 9 MG/ML
3 INJECTION, SOLUTION INTRAVENOUS CONTINUOUS
Status: DISCONTINUED | OUTPATIENT
Start: 2019-01-15 | End: 2019-01-15

## 2019-01-15 RX ORDER — SODIUM CHLORIDE 0.9 % (FLUSH) 0.9 %
5-40 SYRINGE (ML) INJECTION EVERY 8 HOURS
Status: DISCONTINUED | OUTPATIENT
Start: 2019-01-15 | End: 2019-01-16 | Stop reason: HOSPADM

## 2019-01-15 RX ORDER — HEPARIN SODIUM 200 [USP'U]/100ML
1000 INJECTION, SOLUTION INTRAVENOUS AS NEEDED
Status: DISCONTINUED | OUTPATIENT
Start: 2019-01-15 | End: 2019-01-15

## 2019-01-15 RX ADMIN — LOSARTAN POTASSIUM 100 MG: 50 TABLET ORAL at 21:09

## 2019-01-15 RX ADMIN — FENTANYL CITRATE 25 MCG: 50 INJECTION, SOLUTION INTRAMUSCULAR; INTRAVENOUS at 13:31

## 2019-01-15 RX ADMIN — METOPROLOL TARTRATE 50 MG: 50 TABLET ORAL at 16:52

## 2019-01-15 RX ADMIN — Medication 10 ML: at 21:10

## 2019-01-15 RX ADMIN — NITROGLYCERIN 1 INCH: 20 OINTMENT TOPICAL at 00:08

## 2019-01-15 RX ADMIN — LIDOCAINE HYDROCHLORIDE 10 ML: 10 INJECTION INFILTRATION; PERINEURAL at 13:39

## 2019-01-15 RX ADMIN — BIVALIRUDIN 1.75 MG/KG/HR: 250 INJECTION, POWDER, LYOPHILIZED, FOR SOLUTION INTRAVENOUS at 13:51

## 2019-01-15 RX ADMIN — METOPROLOL TARTRATE 50 MG: 50 TABLET ORAL at 08:06

## 2019-01-15 RX ADMIN — AMLODIPINE BESYLATE 10 MG: 5 TABLET ORAL at 21:09

## 2019-01-15 RX ADMIN — Medication 10 ML: at 05:54

## 2019-01-15 RX ADMIN — SODIUM CHLORIDE 999 ML/HR: 900 INJECTION, SOLUTION INTRAVENOUS at 13:42

## 2019-01-15 RX ADMIN — IOPAMIDOL 162 ML: 755 INJECTION, SOLUTION INTRAVENOUS at 14:26

## 2019-01-15 RX ADMIN — FENTANYL CITRATE 25 MCG: 50 INJECTION, SOLUTION INTRAMUSCULAR; INTRAVENOUS at 14:21

## 2019-01-15 RX ADMIN — SODIUM CHLORIDE 1.5 ML/KG/HR: 900 INJECTION, SOLUTION INTRAVENOUS at 13:30

## 2019-01-15 RX ADMIN — NITROGLYCERIN 1 INCH: 20 OINTMENT TOPICAL at 11:56

## 2019-01-15 RX ADMIN — LIDOCAINE HYDROCHLORIDE 10 ML: 10 INJECTION, SOLUTION INFILTRATION; PERINEURAL at 13:39

## 2019-01-15 RX ADMIN — CLOPIDOGREL BISULFATE 600 MG: 300 TABLET, FILM COATED ORAL at 14:30

## 2019-01-15 RX ADMIN — NITROGLYCERIN 1 INCH: 20 OINTMENT TOPICAL at 05:53

## 2019-01-15 RX ADMIN — MIDAZOLAM 1 MG: 1 INJECTION INTRAMUSCULAR; INTRAVENOUS at 13:31

## 2019-01-15 RX ADMIN — EZETIMIBE 10 MG: 10 TABLET ORAL at 21:09

## 2019-01-15 RX ADMIN — SODIUM CHLORIDE 3 ML/KG/HR: 900 INJECTION, SOLUTION INTRAVENOUS at 12:35

## 2019-01-15 RX ADMIN — ROSUVASTATIN CALCIUM 20 MG: 10 TABLET, FILM COATED ORAL at 21:09

## 2019-01-15 RX ADMIN — HEPARIN SODIUM 2000 UNITS: 200 INJECTION, SOLUTION INTRAVENOUS at 13:29

## 2019-01-15 RX ADMIN — IOPAMIDOL 50 ML: 755 INJECTION, SOLUTION INTRAVENOUS at 13:53

## 2019-01-15 NOTE — PROGRESS NOTES
TRANSFER - OUT REPORT: 
 
Verbal report given to Angela Mendoza (name) on Gerson Marvin  being transferred to cath lab recovery for Sandie Awan RN (unit) for routine progression of care Report consisted of patients Situation, Background, Assessment and  
Recommendations(SBAR). Information from the following report(s) SBAR, Procedure Summary and MAR was reviewed with the receiving nurse. Lines:  
Peripheral IV 01/14/19 Left Antecubital (Active) Site Assessment Clean, dry, & intact 1/15/2019  7:54 AM  
Phlebitis Assessment 0 1/15/2019  7:54 AM  
Infiltration Assessment 0 1/15/2019  7:54 AM  
Dressing Status Clean, dry, & intact 1/15/2019  7:54 AM  
Dressing Type Tape;Transparent 1/15/2019  7:54 AM  
Hub Color/Line Status Infusing;Flushed 1/15/2019  7:54 AM  
Action Taken Open ports on tubing capped 1/15/2019  7:54 AM  
Alcohol Cap Used Yes 1/15/2019  7:54 AM  
   
Peripheral IV 01/15/19 Right Forearm (Active) Site Assessment Clean, dry, & intact 1/15/2019  7:54 AM  
Phlebitis Assessment 0 1/15/2019  7:54 AM  
Infiltration Assessment 0 1/15/2019  7:54 AM  
Dressing Status Clean, dry, & intact 1/15/2019  7:54 AM  
Dressing Type Tape;Transparent 1/15/2019  7:54 AM  
Hub Color/Line Status Capped;Flushed 1/15/2019  7:54 AM  
Action Taken Open ports on tubing capped 1/15/2019  7:54 AM  
Alcohol Cap Used Yes 1/15/2019  7:54 AM  
  
 
Opportunity for questions and clarification was provided. Patient transported with: 
 [employer]

## 2019-01-15 NOTE — PROGRESS NOTES
Lovenox Monitoring Indication: [place] with abnormal ekg Recent Labs  
  01/14/19 
1454 HGB 13.2  CREA 1.13* Current Weight: 48.8 kg Est. CrCl = 27 ml/min Current Dose: 60 mg subcutaneously every 12 hours. Plan: Change to 50 mg Q24h for patient weight and CrCl < 30 ml/min

## 2019-01-15 NOTE — PROCEDURES
Cardiac Catheterization Procedure Note   Patient: Landa Dakin  MRN: [id number]  SSN: [id number]   YOB: 1931 Age: 80 y.o.   Sex: female    Date of Procedure: 1/15/2019   Pre-procedure Diagnosis: Unstable Angina  Post-procedure Diagnosis: Coronary Artery Disease  Procedure: PCI  :  Dr. Vianey Reed MD    Assistant(s):  None  Anesthesia: Moderate Sedation   Estimated Blood Loss: Less than 10 mL   Specimens Removed: None  Findings: LVG; EF 55%; LM is fine; LAD with proximal 85% lesion and mid-distal 95% lesion, stented today with 2.5x18 and 2.0x12 [place] stents with excellent results; LIMA-LAD is atretic and atrophied; SVG-RCA is not seen and probably occluded long time ago; RCA is occluded at proximal segment and PDA is filled by LAD; LCX is totally occluded at mid; SVG-OM1 is patent but has diffuse ostial and proximal stenosis upto 75%, stented today with 2.5x26 [place] and post-dilated with 3.0x12 NC upto 14 hayley with excellent result  Complications: None   Implants:  None  Signed by:  Vianey Reed MD  1/15/2019  2:39 PM

## 2019-01-15 NOTE — PROGRESS NOTES
Problem: Falls - Risk of 
Goal: *Absence of Falls Document Robbie Goss Fall Risk and appropriate interventions in the flowsheet. Outcome: Progressing Towards Goal 
Fall Risk Interventions: 
  
 
  
 
Medication Interventions: Patient to call before getting OOB Elimination Interventions: Call light in reach  
 
0800: Bedside shift change report given to Leanna Helton (oncoming nurse) by Jesus Iqbal (offgoing nurse). Report included the following information SBAR, Kardex, Intake/Output, MAR and Recent Results.

## 2019-01-15 NOTE — PROGRESS NOTES
2100: TRANSFER - IN REPORT: 
 
Verbal report received from Vega(name) on Kulwinder Mott  being received from ED(unit) for routine progression of care Report consisted of patients Situation, Background, Assessment and  
Recommendations(SBAR). Information from the following report(s) SBAR, ED Summary and Recent Results was reviewed with the receiving nurse. Opportunity for questions and clarification was provided. Assessment completed upon patients arrival to unit and care assumed.

## 2019-01-15 NOTE — CARDIO/PULMONARY
Cardiac Rehab: CAD education folder given to [employer]. Educated using teach back method. Reviewed purpose of cardiac catheterization and potential CAD diagnosis, for understanding. Discussed risk factors for CAD to include the following: family history, CABG (2002), MI, hyperlipidemia, hypertension, diabetes. Encouraged consideration of lifestyle changes, regardless of cath outcome. Offered Cardiac Rehab Program for support in making lifestyle changes, if indicated. She participated in cardiac rehab after her CABG in 2002 \"when you were downstairs\". [employer] verbalized understanding with questions answered. Daughter present and has questions about how the family history for CAD affects her. Discussed early relationship with a cardiologist for baseline testing, reviewed risk factors for CAD, and encouraged initiation of an exercise program. Education folder given to daughter for her educational needs. Will continue to follow for cardiac catheterization results and potential enrollment in cardiac rehab.  Laura Mosher RN

## 2019-01-15 NOTE — ROUTINE PROCESS
TRANSFER - OUT REPORT: 
 
Verbal report given to Perry Samuel (name) on Shraddha Aguilar  being transferred to [facility] (unit) for routine progression of care Report consisted of patients Situation, Background, Assessment and  
Recommendations(SBAR). Information from the following report(s) SBAR, ED Summary, [facility] and Recent Results was reviewed with the receiving nurse. Lines:  
Peripheral IV 01/14/19 Left Antecubital (Active) Site Assessment Clean, dry, & intact 1/14/2019  2:54 PM  
Phlebitis Assessment 0 1/14/2019  2:54 PM  
Infiltration Assessment 0 1/14/2019  2:54 PM  
Dressing Status Clean, dry, & intact 1/14/2019  2:54 PM  
Dressing Type Transparent 1/14/2019  2:54 PM  
Hub Color/Line Status Pink;Flushed;Patent 1/14/2019  2:54 PM  
Action Taken Blood drawn 1/14/2019  2:54 PM  
  
 
Opportunity for questions and clarification was provided. Patient transported with: 
 Monitor Registered Nurse

## 2019-01-15 NOTE — PROGRESS NOTES
Problem: Falls - Risk of 
Goal: *Absence of Falls Document Everardo Solomon Fall Risk and appropriate interventions in the flowsheet. Outcome: Progressing Towards Goal 
Fall Risk Interventions: 
  
 
  
 
Medication Interventions: Patient to call before getting OOB, Teach patient to arise slowly Elimination Interventions: Call light in reach, Patient to call for help with toileting needs, Toileting schedule/hourly rounds Problem: Pressure Injury - Risk of 
Goal: *Prevention of pressure injury Document Keshav Scale and appropriate interventions in the flowsheet. Outcome: Progressing Towards Goal 
Pressure Injury Interventions:

## 2019-01-15 NOTE — PROGRESS NOTES
Patient received in cath lab RR, hooked to monitoring equipment; armband; consent allergies checked; groins prepped bilaterally.

## 2019-01-15 NOTE — PROGRESS NOTES
Cardiac Cath Lab Procedure Area Arrival Note: Armando Galaviz arrived to Cardiac Cath Lab, Procedure Area. Patient identifiers verified with NAME and DATE OF BIRTH. Procedure verified with patient. Consent forms verified. Allergies verified. Patient informed of procedure and plan of care. Questions answered with review. Patient voiced understanding of procedure and plan of care. Patient on cardiac monitor, non-invasive blood pressure, SPO2 monitor. On room air then placed on O2 @ 2 lpm via NC.  IV of normal saline on pump at 73 ml/hr. Patient status doing well without problems. Patient is A&Ox 4. Patient reports no pain. Patient medicated during procedure with orders obtained and verified by Dr. Alex Jack. Refer to patients Cardiac Cath Lab PROCEDURE REPORT for vital signs, assessment, status, and response during procedure, printed at end of case. Printed report on chart or scanned into chart.

## 2019-01-15 NOTE — PROGRESS NOTES
11:45 TRANSFER - OUT REPORT: 
 
Verbal report given to ETHEL [facility]) on Keyanna Kay  being transferred to cath lab(unit) for ordered procedure Report consisted of patients Situation, Background, Assessment and  
Recommendations(SBAR). Information from the following report(s) SBAR, MAR and Cardiac Rhythm NSR was reviewed with the receiving nurse. Lines:  
Peripheral IV 01/14/19 Left Antecubital (Active) Site Assessment Clean, dry, & intact 1/15/2019  7:54 AM  
Phlebitis Assessment 0 1/15/2019  7:54 AM  
Infiltration Assessment 0 1/15/2019  7:54 AM  
Dressing Status Clean, dry, & intact 1/15/2019  7:54 AM  
Dressing Type Tape;Transparent 1/15/2019  7:54 AM  
Hub Color/Line Status Infusing;Flushed 1/15/2019  7:54 AM  
Action Taken Open ports on tubing capped 1/15/2019  7:54 AM  
Alcohol Cap Used Yes 1/15/2019  7:54 AM  
   
Peripheral IV 01/15/19 Right Forearm (Active) Site Assessment Clean, dry, & intact 1/15/2019  7:54 AM  
Phlebitis Assessment 0 1/15/2019  7:54 AM  
Infiltration Assessment 0 1/15/2019  7:54 AM  
Dressing Status Clean, dry, & intact 1/15/2019  7:54 AM  
Dressing Type Tape;Transparent 1/15/2019  7:54 AM  
Hub Color/Line Status Capped;Flushed 1/15/2019  7:54 AM  
Action Taken Open ports on tubing capped 1/15/2019  7:54 AM  
Alcohol Cap Used Yes 1/15/2019  7:54 AM  
  
 
Opportunity for questions and clarification was provided. Patient transported with: 
 Registered Nurse Keyonna Peter states he will start SIMEON protocol, and to discontinue NS at 75 mL/hr that is currently infusing. Will apply 12:00 nitro patch, per German Gray. /57 at 11:30. Cath lab to obtain consent; pt states she has not yet received explanation of procedure. 12:10 Pt removed from unit telemetry montitor, picked up by cath lab RN and placed on their portable monitor. Family/visitor at bedside.  
 
15:33 Returned call to cath lab to get report on patient prior to returning pt to [facility]. RN will call back when available. 15:44 TRANSFER - IN REPORT: 
 
Verbal report received from [facility]) on Shraddha Aguilar  being received from cath lab(unit) for routine progression of care Report consisted of patients Situation, Background, Assessment and  
Recommendations(SBAR). Information from the following report(s) SBAR was reviewed with the receiving nurse. Opportunity for questions and clarification was provided. Assessment completed upon patients arrival to unit and care assumed. Pt to remain on bedrest until 20:30 tonight. NS IV fluids infusing at 73mL/hr; Dr. Jackie Tripp wants to infuse for one liter. 19:30 Bedside shift change report given to Katherine Darby (oncoming nurse) by Nash Starr (offgoing nurse). Report included the following information SBAR.

## 2019-01-15 NOTE — PROGRESS NOTES
TRANSFER - IN REPORT: 
 
Verbal report received from Lee Calvillo on Elizabeth Cools  being received from procedure room for routine progression of care. Report consisted of patients Situation, Background, Assessment and Recommendations(SBAR). Information from the following report(s) SBAR was reviewed with the receiving clinician. Opportunity for questions and clarification was provided. Assessment completed upon patients arrival to [address] [place] [address] care assumed. Cardiac Cath Lab Recovery Arrival Note: Elizabeth Ross arrived to [facility] recovery area. Patient procedure= [facility] PT[facility] . Patient on cardiac monitor, non-invasive blood pressure, SPO2 monitor. On room air. IV  of NS on pump at 73 ml/hr. Patient status doing well without problems. Patient is A&Ox 3. Patient reports no CP. PROCEDURE SITE CHECK: 
 
Procedure site:without any bleeding and no hematoma, No pain/discomfort reported at procedure site. No change in patient status. Continue to monitor patient and status.

## 2019-01-15 NOTE — PHYSICIAN ADVISORY
Letter of Status Determination:  
Recommend hospitalization status upgraded from OBSERVATION  to INPATIENT  Status Pt Name:  Dante Babcock MR#  
XIMENA # [id number] / 
[id number] Payor: Kurtis Hahn / Plan: [employer] / Product Type: Managed Care Medicare /   
TATIANA#  [id number] Room and Hospital  464/01  @ [facility] hospital  
Hospitalization date  1/14/2019  2:39 PM  
Current Attending Physician  Trinda Blizzard, MD  
Principal diagnosis Clinicals  80 y.o. y.o  female hospitalized with above diagnosis The pt went through cardiac evaluation with documented significant CAD now s/p stent. [place] (MCG) criteria Does  NOT apply STATUS DETERMINATION  This patient is at high risk of adverse events and deterioration based on documented clinical data, comorbid conditions and current acute care course. Ms. Dante Babcock is expected to meet Inpatient Admission status criteria in accordance with CMS regulation Section 43 .3. Specifically, due to medical necessity the patient's stay is expected to exceed Two Midnights. It is our recommendation that this patient's hospitalization status should be upgraded from  OBSERVATION to INPATIENT status. The final decision of the patient's hospitalization status depends on the attending physician's judgment. Additional comments Payor: Kurtis Hahn / Plan: [employer] / Product Type: Managed Care Medicare /   
  
 
Sebas Salazar MD MPH FACP Cell: [phone] Physician Advisor [address] [facility] [address] [facility]  
President Medical Staff, [address] [facility]   
Cell  [phone]   
 
 
[phone] Patty Cruz

## 2019-01-16 VITALS
TEMPERATURE: 97.6 F | SYSTOLIC BLOOD PRESSURE: 149 MMHG | OXYGEN SATURATION: 97 % | WEIGHT: 107.58 LBS | HEART RATE: 69 BPM | DIASTOLIC BLOOD PRESSURE: 65 MMHG | RESPIRATION RATE: 16 BRPM | BODY MASS INDEX: 20 KG/M2

## 2019-01-16 LAB
ATRIAL RATE: 58 BPM
CALCULATED P AXIS, ECG09: 76 DEGREES
CALCULATED R AXIS, ECG10: -7 DEGREES
CALCULATED T AXIS, ECG11: 43 DEGREES
DIAGNOSIS, 93000: NORMAL
P-R INTERVAL, ECG05: 202 MS
Q-T INTERVAL, ECG07: 386 MS
QRS DURATION, ECG06: 74 MS
QTC CALCULATION (BEZET), ECG08: 378 MS
VENTRICULAR RATE, ECG03: 58 BPM

## 2019-01-16 PROCEDURE — 74011250637 HC RX REV CODE- 250/637: Performed by: INTERNAL MEDICINE

## 2019-01-16 RX ORDER — CLOPIDOGREL BISULFATE 75 MG/1
75 TABLET ORAL DAILY
Qty: 30 TAB | Refills: 6 | Status: SHIPPED | OUTPATIENT
Start: 2019-01-16

## 2019-01-16 RX ORDER — METOPROLOL TARTRATE 50 MG/1
50 TABLET ORAL 2 TIMES DAILY
Qty: 60 TAB | Refills: 6 | Status: SHIPPED | OUTPATIENT
Start: 2019-01-16

## 2019-01-16 RX ORDER — ROSUVASTATIN CALCIUM 20 MG/1
20 TABLET, COATED ORAL
Qty: 30 TAB | Refills: 6 | Status: SHIPPED | OUTPATIENT
Start: 2019-01-16

## 2019-01-16 RX ORDER — EZETIMIBE 10 MG/1
10 TABLET ORAL
Qty: 30 TAB | Refills: 6 | Status: SHIPPED | OUTPATIENT
Start: 2019-01-16

## 2019-01-16 RX ORDER — GUAIFENESIN 100 MG/5ML
81 LIQUID (ML) ORAL DAILY
Status: DISCONTINUED | OUTPATIENT
Start: 2019-01-16 | End: 2019-01-16 | Stop reason: HOSPADM

## 2019-01-16 RX ADMIN — METOPROLOL TARTRATE 50 MG: 50 TABLET ORAL at 08:29

## 2019-01-16 RX ADMIN — CLOPIDOGREL BISULFATE 75 MG: 75 TABLET ORAL at 08:29

## 2019-01-16 RX ADMIN — ASPIRIN 81 MG CHEWABLE TABLET 81 MG: 81 TABLET CHEWABLE at 08:29

## 2019-01-16 NOTE — CARDIO/PULMONARY
Cardiac Rehab: Met with Monica Sams. Plavix education sheets given. Teach back method used for education. Discussed purpose, side effects of Plavix, and when to call the cardiologist. Cardiac rehab intake appointment scheduled for 1/30/19 at 1 pm and appointment information is on the AVS. Monica Sams verbalized understanding with questions answered.

## 2019-01-16 NOTE — PROGRESS NOTES
Hospital follow-up PCP transitional care appointment has been scheduled with PHOENIX Malone for Tuesday, 1/22/19 at 2:00 p.m. Pending patient discharge.   Vaughn Vasquez, Care Management Specialist.

## 2019-01-16 NOTE — DISCHARGE INSTRUCTIONS
[email or web address]    Patient Discharge Instructions    Shraddha Aguilar / [id number] : 1931    Admitted 2019 Discharged: 2019       · It is important that you take the medication exactly as they are prescribed. · Keep your medication in the bottles provided by the pharmacist and keep a list of the medication names, dosages, and times to be taken in your wallet. · Do not take other medications without consulting your doctor. BRING ALL OF YOUR MEDICINES TO YOUR OFFICE VISIT with Dr. Brando Parks with Dr. Kaleb Lares in 2 week      Cardiac Catheterization  Discharge Instructions     Do not drive, operate any machinery, or sign any legal documents for 24 hours after your procedure. You must have someone to drive you home.  You may take a shower 24 hours after your cardiac catheterization. Be sure to get the dressing wet and then remove it; gently wash the area with warm soapy water. Pat dry and leave open to air. To help prevent infections, be sure to keep the cath site clean and dry. No lotions, creams, powders, ointments, etc. in the cath site for approximately 1 week.  Do not take a tub bath, get in a hot tub or swimming pool for approximately 5 days or until the cath site is completely healed.  No strenuous activity or heavy lifting over 10 lbs. for 7 days.  Drink plenty of fluids for 24-48 hours after your cath to flush the contrast dye from your kidneys. No alcoholic beverages for 24 hours. You may resume your previous diet (low fat, low cholesterol) after your cath.  After your cath, some bruising or discomfort is common during the healing process. Tylenol, 1-2 tablets every 6 hours as needed, is recommended if you experience any discomfort.   If you experience any signs or symptoms of infection such as fever, chills, or poorly healing incision, persistent tenderness or swelling in the groin, redness and/or warmth to the touch, numbness, significant tingling or pain at the groin site or affected extremity, rash, drainage from the cath site, or if the leg feels tight or swollen, call your physician right away.  If bleeding at the cath site occurs, take a clean gauze pad and apply direct pressure to the groin just above the puncture site. Call 911 immediately, and continue to apply direct pressure until an ambulance gets to your location.  You may return to work  2  days after your cardiac cath if no groin bleeding. Information obtained by :  I understand that if any problems occur once I am at home I am to contact my physician. I understand and acknowledge receipt of the instructions indicated above. R.N.'s Signature                                                                  Date/Time                                                                                                                                              Patient or Representative Signature                                                          Date/Time      Shelli Balderas MD      [address] [place] [address]   [phone]  [facility] [address] [facility]    [email or web address]

## 2019-01-16 NOTE — DISCHARGE SUMMARY
Cardiology Discharge Summary     Patient ID:  Cindy Mckinney  [id number]  18 y.o.  6/12/1931    Admit Date: 1/14/2019    Discharge Date: 1/16/2019     Admitting Physician: Mai Vera MD     Discharge Physician: Mai Vera MD    Admission Diagnoses: Unstable angina [facility])  Unstable angina [facility])    Discharge Diagnoses: Active Problems:    Unstable angina ([address] (1/14/2019)    CAD  S/p stents in LAD  S/p stent in LCX graft  EF preserved  Hyperlipidemia  HTN  H/o CABG  Bypass graft diseases    Discharge Condition: Stable    Cardiology Procedures this Admission:  Left heart catheterization with PCI    Hospital Course: She presented with [place] and was found to have occluded native RCA/LCX and SVG-RCA and atretic LIMA-LAD. She received stents in LAD at proximal and distal ( 2.5x18 and 2.0x12 [place] ) segments and stent in remaining SVG-OM1 with 2.5x26 [place] with excellent results. EF was preserved at 55% as distal RCA and OM2 branches were receiving collaterals from LAD. Her groin was fine with no complication and no further chest pain with ambulation. Consults: None    Discharge Exam:     Visit Vitals  /60 (BP 1 Location: Right arm, BP Patient Position: At rest)   Pulse 62   Temp 98.5 °F (36.9 °C)   Resp 16   Wt 48.8 kg (107 lb 9.4 oz)   SpO2 97%   BMI 20.00 kg/m²     General Appearance:  Well developed, well nourished, in no acute distress. Ears/Nose/Mouth/Throat:   Hearing grossly normal.         Neck: Supple. Chest:   Lungs clear to auscultation bilaterally. Normal resp effort. Cardiovascular:  Regular rate and rhythm, S1, S2 normal, no new murmur. Abdomen:   Soft, non-tender, bowel sounds are present. Extremities: No edema bilaterally. Neuro:  Skin: A/O x 3, grossly nonfocal. Normal mood/affect. Warm and dry.                Disposition: home    Patient Instructions:   Current Discharge Medication List      START taking these medications    Details   clopidogrel (PLAVIX) 75 mg tab Take 1 Tab by mouth daily.  Qty: 30 Tab, Refills: 6                                           CONTINUE these medications which have NOT CHANGED    Details   amLODIPine (NORVASC) 10 mg tablet Take 10 mg by mouth nightly. thiamine HCL (B-1) 100 mg tablet Take 100 mg by mouth daily. metoprolol tartrate (LOPRESSOR) 50 mg tablet Take 50 mg by mouth two (2) times a day.      ezetimibe (ZETIA) 10 mg tablet Take 10 mg by mouth nightly. rosuvastatin (CRESTOR) 20 mg tablet Take 20 mg by mouth daily. cetirizine (ZYRTEC) 10 mg tablet Take 10 mg by mouth daily as needed for Allergies. irbesartan (AVAPRO) 300 mg tablet Take 300 mg by mouth nightly. aspirin delayed-release 81 mg tablet Take 81 mg by mouth daily. Referenced discharge instructions provided by nursing for diet and activity.     Follow-up with Dr. Amparo Berumen in two weeks     Signed:  Harry Corral MD  1/16/2019  6:44 AM

## 2019-01-16 NOTE — PROGRESS NOTES
I have reviewed discharge instructions with the patient. The patient verbalized understanding. Discharge medications reviewed with patient and appropriate educational materials and side effects teaching were provided. New medications gone over in detail with patient. Verbalized understanding, no new questions.

## 2019-01-16 NOTE — PROGRESS NOTES
1930 Bedside and Verbal shift change report given to Mercy Carter (oncoming nurse) by Jose Hernandez RN (offgoing nurse). Report included the following information SBAR, Procedure Summary, MAR, Cardiac Rhythm NSR and Alarm Parameters .

## 2019-01-17 ENCOUNTER — TELEPHONE (OUTPATIENT)
Dept: INTERNAL MEDICINE CLINIC | Age: 84
End: 2019-01-17

## 2019-01-17 NOTE — TELEPHONE ENCOUNTER
----- Message from Elan Corley sent at 1/17/2019 12:38 PM EST -----  Regarding: Dr. Lilia Molina  Pt requesting information about \"Nitroglycerin patch\" on right shoulder that she receive from [facility] on Wednesday 01/16/19. Pt requesting to speak with the nurse in regards to this matter.   Best contact number [phone]

## 2019-01-17 NOTE — TELEPHONE ENCOUNTER
Returned call to pt - verified self and birth date. Pt states that she was admitted to [facility] Jan 14th and had 3 stents placed.  states that a Nitroglycerin patch was placed on her during her hospital stay. Today, she reports that the patch is still in place and she did not receive any additional information advising if this is to be removed, when to take it off, or if she needs additional patches.  states that she was evaluated by Dr. Zak Horn prior to being discharged yesterday, but nothing was mentioned regarding the patch. Pt was encouraged to contact Dr. Zak Horn office to receive further instruction regarding the patch and if she will need any additional ordered.  verbalized understanding and agreed to plan.

## 2019-01-22 ENCOUNTER — OFFICE VISIT (OUTPATIENT)
Dept: INTERNAL MEDICINE CLINIC | Age: 84
End: 2019-01-22

## 2019-01-22 VITALS
OXYGEN SATURATION: 100 % | HEART RATE: 71 BPM | SYSTOLIC BLOOD PRESSURE: 138 MMHG | TEMPERATURE: 97.3 F | BODY MASS INDEX: 21.16 KG/M2 | HEIGHT: 62 IN | WEIGHT: 115 LBS | DIASTOLIC BLOOD PRESSURE: 72 MMHG

## 2019-01-22 DIAGNOSIS — Z09 HOSPITAL DISCHARGE FOLLOW-UP: Primary | ICD-10-CM

## 2019-01-22 DIAGNOSIS — I10 ESSENTIAL HYPERTENSION: ICD-10-CM

## 2019-01-22 DIAGNOSIS — Z95.5 HISTORY OF HEART ARTERY STENT: ICD-10-CM

## 2019-01-22 NOTE — PROGRESS NOTES
79 yo female in for hospital f/u. Stayed at [facility] 1/14-16/2019. Admitted with unstable angina. Renal fx mildly compromised on admission had normalized by discharge. Dr. Elida York placed 3 CA stents, and she was discharged on Plavix along with pre-admission meds. She will see Dr. José Manuel Dinero, Cardiologist, for f/u next week. She reports a small lump in her R groin w/ some surrounding tenderness. No further chest discomfort or nausea. She was a little light-headed when first home, but none further. She reports sleep is for several hours, then she wakes and can't get back to sleep for several hours. She watches a lot of TV and naps some then. She will be starting Cardiac Rehab next week. PE: Thin BF is alert   Weight - up 8 lbs in last week   BP - 138/72   Heart - RRR @ 60/M   Lungs - clear   RLE - groin - 2.0 cm area of palpable firmness    Imp: Hospital discharge f/u   Recent CA stents & hx CABG   HTN - controlled    Plan: Discussed last A1c of 6.4; she likes fruit juices and we discussed judicious intake of simple carbs   Next OV with Dr. Verduzco Pa is scheduled for July   F/U with Cardiology as scheduled next week  ____________________________  Expected course of current diagnosed problem(s) as well as expected progression and possible complications, and desired follow up with provider are discussed with patient. Patient is encouraged to be back in touch with any questions or concerns. Patient expresses understanding of plan of care. Patient is given AVS which includes diagnoses, current medications, vitals.

## 2019-01-22 NOTE — PROGRESS NOTES
Reviewed record in preparation for visit and have obtained necessary documentation. Identified pt with two pt identifiers(name and ). Health Maintenance Due   Topic    DTaP/Tdap/Td series (1 - Tdap)    Pneumococcal 65+ Low/Medium Risk (2 of 2 - PCV13)         Chief Complaint   Patient presents with   [facility] Follow Up     [facility] 2019-2019 DX; Unstable Agina        Wt Readings from Last 3 Encounters:   19 115 lb (52.2 kg)   19 107 lb 9.4 oz (48.8 kg)   19 110 lb 9.6 oz (50.2 kg)     Temp Readings from Last 3 Encounters:   19 97.6 °F (36.4 °C)   19 97.9 °F (36.6 °C) (Oral)   18 97.9 °F (36.6 °C) (Oral)     BP Readings from Last 3 Encounters:   19 149/65   19 142/68   18 140/68     Pulse Readings from Last 3 Encounters:   19 69   19 67   18 69           Learning Assessment:  :     Learning Assessment 2019 5/10/2018 2017 2015 2014   PRIMARY LEARNER Patient Patient Patient Patient Patient   HIGHEST LEVEL OF EDUCATION - PRIMARY LEARNER  > 4 YEARS OF COLLEGE > 4 YEARS OF COLLEGE - > 4 YEARS OF COLLEGE > 4 YEARS OF COLLEGE   BARRIERS PRIMARY LEARNER NONE NONE - NONE NONE   CO-LEARNER CAREGIVER - No - No No   PRIMARY LANGUAGE ENGLISH ENGLISH ENGLISH ENGLISH ENGLISH    NEED - - - No No   LEARNER PREFERENCE PRIMARY DEMONSTRATION PICTURES DEMONSTRATION DEMONSTRATION OTHER (COMMENT)     - - - PICTURES -     - - - READING -   LEARNING SPECIAL TOPICS - - - no no   ANSWERED BY patient patient patient patient patient   RELATIONSHIP SELF SELF SELF SELF SELF       Depression Screening:  :     PHQ over the last two weeks 2019   Little interest or pleasure in doing things Several days   Feeling down, depressed, irritable, or hopeless Several days   Total Score PHQ 2 2       Fall Risk Assessment:  :     Fall Risk Assessment, last 12 mths 2019   Able to walk? Yes   Fall in past 12 months?  No       Abuse Screening:  :     Abuse Screening Questionnaire 1/7/2019 5/10/2018 11/10/2017 4/23/2015   Do you ever feel afraid of your partner? N N N N   Are you in a relationship with someone who physically or mentally threatens you? N N N N   Is it safe for you to go home? Nadya Cheema       Coordination of Care Questionnaire:  :     1) Have you been to an emergency room, urgent care clinic since your last visit? yes  Hospitalized since your last visit? yes             2) Have you seen or consulted any other health care providers outside of [address] [place] [address] since your last visit? no  (Include any pap smears or colon screenings in this section.)    3) Do you have an Advance Directive on file? no    4) Are you interested in receiving information on Advance Directives? NO      Patient is accompanied by self I have received verbal consent from Patricio Patten to discuss any/all medical information while they are present in the room.

## 2019-01-29 ENCOUNTER — TELEPHONE (OUTPATIENT)
Dept: CARDIAC REHAB | Age: 84
End: 2019-01-29

## 2019-01-29 NOTE — TELEPHONE ENCOUNTER
1/29/2019 Cardiac Rehab: Called Ms. Waqas Gonzalez to remind of intake appointment on Wednesday, 1/30/2019. Confirmed appointment with patient. Provided patient with contact information for [facility]. Also, reminded patient to bring a list of current medications, a personal schedule, and to wear comfortable clothes and shoes.  Cadence Adler

## 2019-01-30 ENCOUNTER — HOSPITAL ENCOUNTER (OUTPATIENT)
Dept: CARDIAC REHAB | Age: 84
Discharge: HOME | End: 2019-01-30
Payer: MEDICARE

## 2019-01-30 VITALS — HEIGHT: 62 IN | WEIGHT: 111 LBS | BODY MASS INDEX: 20.43 KG/M2

## 2019-01-30 PROCEDURE — 93798 PHYS/QHP OP CAR RHAB W/ECG: CPT

## 2019-01-30 NOTE — CARDIO/PULMONARY
Mannie Vigil  80 y.o. presented to cardiac wellness for orientation and exercise tolerance test today with a primary diagnosis of PCI, s/p stents to LAD and LCX graft . EF is 55% . Mannie Vigil has a history of MI, CABG x3 in 2001 at [facility]. Cardiac risk factors include dyslipidemia, hypertension, prior MI and these were reviewed with CeliaRadha Holder lives with her daughter, [facility] and her family. She is a retired early childhood educator. PHQ9, depression score, is 2 and this is considered normal. The result was discussed with patient who agrees score to be accurate. Patient denied chest pain or SOB during 6 minute walk and was in SB/SR with rare pvc. She appeared deconditioned. Mannie Vigil will attend a 60 minute class once a week and exercise 1 day a week in cardiac wellness. She has a very high copayment, until her Maximum out of pocket is satisfied. She was given an application for a carecard to complete and return. Education manual given and reviewed briefly.       Marquise Pedresen RN  1/30/2019

## 2019-02-06 ENCOUNTER — TELEPHONE (OUTPATIENT)
Dept: CARDIAC REHAB | Age: 84
End: 2019-02-06

## 2019-02-06 NOTE — TELEPHONE ENCOUNTER
2/6/2019 Cardiac Wellness: Ms. Joshua Schaffer called to cancel today's appointment d/t not sleeping well last night. Will return for next appointment.  Harman Michel

## 2019-02-13 ENCOUNTER — TELEPHONE (OUTPATIENT)
Dept: CARDIAC REHAB | Age: 84
End: 2019-02-13

## 2019-02-13 ENCOUNTER — APPOINTMENT (OUTPATIENT)
Dept: CARDIAC REHAB | Age: 84
End: 2019-02-13

## 2019-02-13 NOTE — TELEPHONE ENCOUNTER
2/13/2019 Cardiac Wellness: Ms. Raymond Li called to cancel today's appointment d/t not feeling well. Will return for next appointment.  Gama Choi

## 2019-02-21 ENCOUNTER — TELEPHONE (OUTPATIENT)
Dept: CARDIAC REHAB | Age: 84
End: 2019-02-21

## 2019-02-21 NOTE — TELEPHONE ENCOUNTER
Cardiac Rehab: Call placed to Elizabeth Cody to check on her absence from the exercise program. Spoke with Elizabeth Ross  and she has had some conflicts due to frequent dental appointments. Will try to be back next week. Yelena Guerin RN    2/13/2019 Cardiac Wellness: Ms. Elizabeth Ross called to cancel today's appointment d/t not feeling well. Will return for next appointment.  Brandi Leone

## 2019-02-28 ENCOUNTER — TELEPHONE (OUTPATIENT)
Dept: CARDIAC REHAB | Age: 84
End: 2019-02-28

## 2019-02-28 NOTE — TELEPHONE ENCOUNTER
Pt called to say she will be unable to continue with cardiac rehab d/t finances. Will remove future appointments at this time.

## 2019-03-06 ENCOUNTER — APPOINTMENT (OUTPATIENT)
Dept: CARDIAC REHAB | Age: 84
End: 2019-03-06

## 2019-03-13 ENCOUNTER — APPOINTMENT (OUTPATIENT)
Dept: CARDIAC REHAB | Age: 84
End: 2019-03-13

## 2019-03-20 ENCOUNTER — APPOINTMENT (OUTPATIENT)
Dept: CARDIAC REHAB | Age: 84
End: 2019-03-20

## 2019-03-21 NOTE — PROGRESS NOTES
Akira Eugenia  80 y.o. With diagnosis of stent, 01/15/19,attended phase II cardiac rehab for first session only. Ms. Damon Chatman is not able to continue cardiac rehab due to financial issues. She has been discharged.     Vanessa Denise RN  3/21/2019

## 2019-03-27 ENCOUNTER — APPOINTMENT (OUTPATIENT)
Dept: CARDIAC REHAB | Age: 84
End: 2019-03-27

## 2019-04-03 ENCOUNTER — APPOINTMENT (OUTPATIENT)
Dept: CARDIAC REHAB | Age: 84
End: 2019-04-03

## 2019-04-10 ENCOUNTER — APPOINTMENT (OUTPATIENT)
Dept: CARDIAC REHAB | Age: 84
End: 2019-04-10

## 2019-04-17 ENCOUNTER — APPOINTMENT (OUTPATIENT)
Dept: CARDIAC REHAB | Age: 84
End: 2019-04-17

## 2019-04-24 ENCOUNTER — APPOINTMENT (OUTPATIENT)
Dept: CARDIAC REHAB | Age: 84
End: 2019-04-24

## 2019-09-24 ENCOUNTER — OFFICE VISIT (OUTPATIENT)
Dept: INTERNAL MEDICINE CLINIC | Age: 84
End: 2019-09-24

## 2019-09-24 VITALS
RESPIRATION RATE: 16 BRPM | BODY MASS INDEX: 19.77 KG/M2 | TEMPERATURE: 98.7 F | OXYGEN SATURATION: 97 % | WEIGHT: 107.4 LBS | DIASTOLIC BLOOD PRESSURE: 70 MMHG | HEIGHT: 62 IN | SYSTOLIC BLOOD PRESSURE: 136 MMHG | HEART RATE: 59 BPM

## 2019-09-24 DIAGNOSIS — Z79.899 ENCOUNTER FOR LONG-TERM (CURRENT) USE OF MEDICATIONS: ICD-10-CM

## 2019-09-24 DIAGNOSIS — Z13.31 SCREENING FOR DEPRESSION: ICD-10-CM

## 2019-09-24 DIAGNOSIS — Z13.39 SCREENING FOR ALCOHOLISM: ICD-10-CM

## 2019-09-24 DIAGNOSIS — R73.09 ELEVATED GLUCOSE: ICD-10-CM

## 2019-09-24 DIAGNOSIS — Z00.00 MEDICARE ANNUAL WELLNESS VISIT, SUBSEQUENT: Primary | ICD-10-CM

## 2019-09-24 DIAGNOSIS — I10 BENIGN HYPERTENSION: ICD-10-CM

## 2019-09-24 DIAGNOSIS — Z23 ENCOUNTER FOR IMMUNIZATION: ICD-10-CM

## 2019-09-24 DIAGNOSIS — E78.2 MIXED HYPERLIPIDEMIA: ICD-10-CM

## 2019-09-24 PROBLEM — F32.A MILD DEPRESSION: Status: RESOLVED | Noted: 2018-05-11 | Resolved: 2019-09-24

## 2019-09-24 NOTE — PROGRESS NOTES
Chief Complaint   Patient presents with    Hypertension     Reviewed record in preparation for visit and have obtained necessary documentation. Identified pt with two pt identifiers(name and ).       Health Maintenance Due   Topic    DTaP/Tdap/Td series (1 - Tdap)    Pneumococcal 65+ years (2 of 2 - PCV13)    MEDICARE YEARLY EXAM     Influenza Age 5 to Adult     GLAUCOMA SCREENING Q2Y          Chief Complaint   Patient presents with    Hypertension        Wt Readings from Last 3 Encounters:   19 107 lb 6.4 oz (48.7 kg)   19 111 lb (50.3 kg)   19 115 lb (52.2 kg)     Temp Readings from Last 3 Encounters:   19 98.7 °F (37.1 °C) (Oral)   19 97.3 °F (36.3 °C) (Oral)   19 97.6 °F (36.4 °C)     BP Readings from Last 3 Encounters:   19 136/70   19 138/72   19 149/65     Pulse Readings from Last 3 Encounters:   19 (!) 59   19 71   19 69           Learning Assessment:  :     Learning Assessment 2019 5/10/2018 2017 2015 2014   PRIMARY LEARNER Patient Patient Patient Patient Patient   HIGHEST LEVEL OF EDUCATION - PRIMARY LEARNER  > 4 YEARS OF COLLEGE > 4 YEARS OF COLLEGE - > 4 YEARS OF COLLEGE > 4 YEARS OF COLLEGE   BARRIERS PRIMARY LEARNER NONE NONE - NONE NONE   CO-LEARNER CAREGIVER - No - No No   PRIMARY LANGUAGE ENGLISH ENGLISH ENGLISH ENGLISH ENGLISH    NEED - - - No No   LEARNER PREFERENCE PRIMARY DEMONSTRATION PICTURES DEMONSTRATION DEMONSTRATION OTHER (COMMENT)     - - - PICTURES -     - - - READING -   LEARNING SPECIAL TOPICS - - - no no   ANSWERED BY patient patient patient patient patient   RELATIONSHIP SELF SELF SELF SELF SELF       Depression Screening:  :     3 most recent PHQ Screens 2019   Little interest or pleasure in doing things Not at all   Feeling down, depressed, irritable, or hopeless Not at all   Total Score PHQ 2 0   Trouble falling or staying asleep, or sleeping too much Several days Feeling tired or having little energy Several days   Poor appetite, weight loss, or overeating Not at all   Feeling bad about yourself - or that you are a failure or have let yourself or your family down Not at all   Trouble concentrating on things such as school, work, reading, or watching TV Not at all   Moving or speaking so slowly that other people could have noticed; or the opposite being so fidgety that others notice Not at all   Thoughts of being better off dead, or hurting yourself in some way Not at all   PHQ 9 Score 2       Fall Risk Assessment:  :     Fall Risk Assessment, last 12 mths 9/24/2019   Able to walk? Yes   Fall in past 12 months? No       Abuse Screening:  :     Abuse Screening Questionnaire 1/7/2019 5/10/2018 11/10/2017 4/23/2015   Do you ever feel afraid of your partner? N N N N   Are you in a relationship with someone who physically or mentally threatens you? N N N N   Is it safe for you to go home? Y Y Y Y       Coordination of Care Questionnaire:  :     1) Have you been to an emergency room, urgent care clinic since your last visit? no   Hospitalized since your last visit? no             2) Have you seen or consulted any other health care providers outside of [address] [place] [address] since your last visit? no  (Include any pap smears or colon screenings in this section.)    3) Do you have an Advance Directive on file? no    4) Are you interested in receiving information on Advance Directives? NO      Patient is accompanied by self I have received verbal consent from Stacy Barlow to discuss any/all medical information while they are present in the room. Reviewed record  In preparation for visit and have obtained necessary documentation.

## 2019-09-24 NOTE — PATIENT INSTRUCTIONS
Medicare Wellness Visit, Female The best way to live healthy is to have a lifestyle where you eat a well-balanced diet, exercise regularly, limit alcohol use, and quit all forms of tobacco/nicotine, if applicable. Regular preventive services are another way to keep healthy. Preventive services (vaccines, screening tests, monitoring & exams) can help personalize your care plan, which helps you manage your own care. Screening tests can find health problems at the earliest stages, when they are easiest to treat. [facility] follows the current, evidence-based guidelines published by the [facility] ([facility]) when recommending preventive services for our patients. Because we follow these guidelines, sometimes recommendations change over time as research supports it. (For example, mammograms used to be recommended annually. Even though Medicare will still pay for an annual mammogram, the newer guidelines recommend a mammogram every two years for women of average risk.) Of course, you and your doctor may decide to screen more often for some diseases, based on your risk and your health status. Preventive services for you include: - Medicare offers their members a free annual wellness visit, which is time for you and your primary care provider to discuss and plan for your preventive service needs. Take advantage of this benefit every year! 
-All adults over the age of 72 should receive the recommended pneumonia vaccines. Current USPSTF guidelines recommend a series of two vaccines for the best pneumonia protection.  
-All adults should have a flu vaccine yearly and a tetanus vaccine every 10 years. All adults age 61 and older should receive a shingles vaccine once in their lifetime.   
-A bone mass density test is recommended when a woman turns 65 to screen for osteoporosis. This test is only recommended one time, as a screening. Some providers will use this same test as a disease monitoring tool if you already have osteoporosis. -All adults age 38-68 who are overweight should have a diabetes screening test once every three years.  
-Other screening tests and preventive services for persons with diabetes include: an eye exam to screen for diabetic retinopathy, a kidney function test, a foot exam, and stricter control over your cholesterol.  
-Cardiovascular screening for adults with routine risk involves an electrocardiogram (ECG) at intervals determined by your doctor.  
-Colorectal cancer screenings should be done for adults age 54-65 with no increased risk factors for colorectal cancer. There are a number of acceptable methods of screening for this type of cancer. Each test has its own benefits and drawbacks. Discuss with your doctor what is most appropriate for you during your annual wellness visit. The different tests include: colonoscopy (considered the best screening method), a fecal occult blood test, a fecal DNA test, and sigmoidoscopy. -Breast cancer screenings are recommended every other year for women of normal risk, age 54-69. 
-Cervical cancer screenings for women over age 72 are only recommended with certain risk factors.  
-All adults born between [facility] should be screened once for Hepatitis C. Here is a list of your current Health Maintenance items (your personalized list of preventive services) with a due date: 
Health Maintenance Due Topic Date Due  
 DTaP/Tdap/Td  (1 - Tdap) 06/12/1952  Pneumococcal Vaccine (2 of 2 - PCV13) 01/02/2015  Flu Vaccine  08/01/2019  Glaucoma Screening   08/24/2019 Vaccine Information Statement Influenza (Flu) Vaccine (Inactivated or Recombinant): What You Need to Know Many Vaccine Information Statements are available in Japanese and other languages. See www.immunize.org/vis Hojas de información sobre vacunas están disponibles en español y en opal villalobos. Visite www.immunize.org/vis 1. Why get vaccinated? Influenza vaccine can prevent influenza (flu). Flu is a contagious disease that spreads around the [place] every year, usually between October and May. Anyone can get the flu, but it is more dangerous for some people. Infants and young children, people 72years of age and older, pregnant women, and people with certain health conditions or a weakened immune system are at greatest risk of flu complications. Pneumonia, bronchitis, sinus infections and ear infections are examples of flu-related complications. If you have a medical condition, such as heart disease, cancer or diabetes, flu can make it worse. Flu can cause fever and chills, sore throat, muscle aches, fatigue, cough, headache, and runny or stuffy nose. Some people may have vomiting and diarrhea, though this is more common in children than adults. Each year thousands of people in the [facility] die from flu, and many more are hospitalized. Flu vaccine prevents millions of illnesses and flu-related visits to the doctor each year. 2. Influenza vaccines CDC recommends everyone 10months of age and older get vaccinated every flu season. Children 6 months through 6years of age may need 2 doses during a single flu season. Everyone else needs only 1 dose each flu season. It takes about 2 weeks for protection to develop after vaccination. There are many flu viruses, and they are always changing. Each year a new flu vaccine is made to protect against three or four viruses that are likely to cause disease in the upcoming flu season. Even when the vaccine doesnt exactly match these viruses, it may still provide some protection. Influenza vaccine does not cause flu. Influenza vaccine may be given at the same time as other vaccines. 3. Talk with your health care provider Tell your vaccine provider if the person getting the vaccine:  Has had an allergic reaction after a previous dose of influenza vaccine, or has any severe, life-threatening allergies.  Has ever had Guillain-Barré Syndrome (also called GBS). In some cases, your health care provider may decide to postpone influenza vaccination to a future visit. People with minor illnesses, such as a cold, may be vaccinated. People who are moderately or severely ill should usually wait until they recover before getting influenza vaccine. Your health care provider can give you more information. 4. Risks of a reaction  Soreness, redness, and swelling where shot is given, fever, muscle aches, and headache can happen after influenza vaccine.  There may be a very small increased risk of Guillain-Barré Syndrome (GBS) after inactivated influenza vaccine (the flu shot). Adina Dias children who get the flu shot along with pneumococcal vaccine (PCV13), and/or DTaP vaccine at the same time might be slightly more likely to have a seizure caused by fever. Tell your health care provider if a child who is getting flu vaccine has ever had a seizure. People sometimes faint after medical procedures, including vaccination. Tell your provider if you feel dizzy or have vision changes or ringing in the ears. As with any medicine, there is a very remote chance of a vaccine causing a severe allergic reaction, other serious injury, or death. 5. What if there is a serious problem? An allergic reaction could occur after the vaccinated person leaves the clinic. If you see signs of a severe allergic reaction (hives, swelling of the face and throat, difficulty breathing, a fast heartbeat, dizziness, or weakness), call 9-1-1 and get the person to the nearest hospital. 
 
For other signs that concern you, call your health care provider. Adverse reactions should be reported to the Vaccine Adverse Event Reporting System (VAERS).  Your health care provider will usually file this report, or you can do it yourself. Visit the VAERS website at www.vaers. [facility].gov or call 8-405.798.4503. VAERS is only for reporting reactions, and VAERS staff do not give medical advice. 6. The National Vaccine Injury Compensation Program 
 
The [facility] Vaccine Injury Compensation Program (VICP) is a federal program that was created to compensate people who may have been injured by certain vaccines. Visit the VICP website at www.[facility].gov/vaccinecompensation or call 9-305.500.2457 to learn about the program and about filing a claim. There is a time limit to file a claim for compensation. 7. How can I learn more?  Ask your health care provider.  Call your local or state health department.  Contact the Centers for Disease Control and Prevention (CDC): 
- Call 3-222.867.1271 (1-800-CDC-INFO) or 
- Visit CDCs influenza website at www.cdc.gov/flu Vaccine Information Statement (Interim) Inactivated Influenza Vaccine 8/15/2019 
[address] 801CI-62 Department of [facility] and [employer] Centers for Disease Control and Prevention Office Use Only

## 2019-09-25 LAB
ALBUMIN SERPL-MCNC: 4.2 G/DL (ref 3.5–4.7)
ALBUMIN/GLOB SERPL: 1.4 {RATIO} (ref 1.2–2.2)
ALP SERPL-CCNC: 85 IU/L (ref 39–117)
ALT SERPL-CCNC: 22 IU/L (ref 0–32)
APPEARANCE UR: CLEAR
AST SERPL-CCNC: 31 IU/L (ref 0–40)
BACTERIA #/AREA URNS HPF: NORMAL /[HPF]
BASOPHILS # BLD AUTO: 0 X10E3/UL (ref 0–0.2)
BASOPHILS NFR BLD AUTO: 1 %
BILIRUB SERPL-MCNC: 0.3 MG/DL (ref 0–1.2)
BILIRUB UR QL STRIP: NEGATIVE
BUN SERPL-MCNC: 21 MG/DL (ref 8–27)
BUN/CREAT SERPL: 20 (ref 12–28)
CALCIUM SERPL-MCNC: 11.4 MG/DL (ref 8.7–10.3)
CASTS URNS QL MICRO: NORMAL /LPF
CHLORIDE SERPL-SCNC: 104 MMOL/L (ref 96–106)
CHOLEST SERPL-MCNC: 145 MG/DL (ref 100–199)
CO2 SERPL-SCNC: 22 MMOL/L (ref 20–29)
COLOR UR: YELLOW
CREAT SERPL-MCNC: 1.07 MG/DL (ref 0.57–1)
EOSINOPHIL # BLD AUTO: 0.1 X10E3/UL (ref 0–0.4)
EOSINOPHIL NFR BLD AUTO: 1 %
EPI CELLS #/AREA URNS HPF: NORMAL /HPF (ref 0–10)
ERYTHROCYTE [DISTWIDTH] IN BLOOD BY AUTOMATED COUNT: 11.9 % (ref 12.3–15.4)
EST. AVERAGE GLUCOSE BLD GHB EST-MCNC: 137 MG/DL
GLOBULIN SER CALC-MCNC: 2.9 G/DL (ref 1.5–4.5)
GLUCOSE SERPL-MCNC: 84 MG/DL (ref 65–99)
GLUCOSE UR QL: NEGATIVE
HBA1C MFR BLD: 6.4 % (ref 4.8–5.6)
HCT VFR BLD AUTO: 36.3 % (ref 34–46.6)
HDLC SERPL-MCNC: 66 MG/DL
HGB BLD-MCNC: 11.9 G/DL (ref 11.1–15.9)
HGB UR QL STRIP: NEGATIVE
IMM GRANULOCYTES # BLD AUTO: 0 X10E3/UL (ref 0–0.1)
IMM GRANULOCYTES NFR BLD AUTO: 1 %
INTERPRETATION, 910389: NORMAL
INTERPRETATION: NORMAL
KETONES UR QL STRIP: NEGATIVE
LDLC SERPL CALC-MCNC: 66 MG/DL (ref 0–99)
LEUKOCYTE ESTERASE UR QL STRIP: NEGATIVE
LYMPHOCYTES # BLD AUTO: 1.4 X10E3/UL (ref 0.7–3.1)
LYMPHOCYTES NFR BLD AUTO: 24 %
MCH RBC QN AUTO: 29.4 PG (ref 26.6–33)
MCHC RBC AUTO-ENTMCNC: 32.8 G/DL (ref 31.5–35.7)
MCV RBC AUTO: 90 FL (ref 79–97)
MICRO URNS: ABNORMAL
MONOCYTES # BLD AUTO: 0.5 X10E3/UL (ref 0.1–0.9)
MONOCYTES NFR BLD AUTO: 9 %
NEUTROPHILS # BLD AUTO: 3.6 X10E3/UL (ref 1.4–7)
NEUTROPHILS NFR BLD AUTO: 64 %
NITRITE UR QL STRIP: NEGATIVE
PDF IMAGE, 910387: NORMAL
PH UR STRIP: 6.5 [PH] (ref 5–7.5)
PLATELET # BLD AUTO: 209 X10E3/UL (ref 150–450)
POTASSIUM SERPL-SCNC: 4.3 MMOL/L (ref 3.5–5.2)
PROT SERPL-MCNC: 7.1 G/DL (ref 6–8.5)
PROT UR QL STRIP: ABNORMAL
RBC # BLD AUTO: 4.05 X10E6/UL (ref 3.77–5.28)
RBC #/AREA URNS HPF: NORMAL /HPF (ref 0–2)
SODIUM SERPL-SCNC: 138 MMOL/L (ref 134–144)
SP GR UR: 1.01 (ref 1–1.03)
TRIGL SERPL-MCNC: 66 MG/DL (ref 0–149)
URINALYSIS REFLEX, 377202: ABNORMAL
UROBILINOGEN UR STRIP-MCNC: 0.2 MG/DL (ref 0.2–1)
VLDLC SERPL CALC-MCNC: 13 MG/DL (ref 5–40)
WBC # BLD AUTO: 5.6 X10E3/UL (ref 3.4–10.8)
WBC #/AREA URNS HPF: NORMAL /HPF (ref 0–5)

## 2020-02-25 NOTE — PROGRESS NOTES
Patient's identity verified with two patient identifiers (name and date of birth). 1. Have you been to the ER, urgent care clinic since your last visit? Hospitalized since your last visit? No  2. Have you seen or consulted any other health care providers outside of the [address] [place] [address] since your last visit? Include any pap smears or colon screening. No     Seen by Dr. Miguel Brock last month per patient. Chief Complaint   Patient presents with    Hypertension     6 month follow up    Blood sugar problem     6 month follow up    Cold Symptoms     Dry cough turned productive after taking Mucinex, clear mucous, and nasal congestion x1wk. Complains of feeling \"weak\" and dec appetite. Denies fever, chills/body aches, sore throat, or ear pain.  Annual Wellness Visit     Medicare wellness due. Fasting. Health Maintenance Due   Topic Date Due    DTaP/Tdap/Td series (1 - Tdap)  Has not had. 06/15/1952    Pneumococcal 65+ Low/Medium Risk (2 of 2 - PCV13)  Has not had. 01/02/2015    MEDICARE YEARLY EXAM   due 03/14/2018     Reviewed record in preparation for visit and have obtained necessary documentation.      Wt Readings from Last 3 Encounters:   05/10/18 110 lb (49.9 kg)   11/10/17 113 lb (51.3 kg)   05/11/17 114 lb (51.7 kg)     Temp Readings from Last 3 Encounters:   05/10/18 97.4 °F (36.3 °C) (Oral)   11/10/17 98.7 °F (37.1 °C) (Oral)   05/11/17 97.6 °F (36.4 °C) (Oral)     BP Readings from Last 3 Encounters:   05/10/18 144/78   11/10/17 145/72   05/11/17 134/72     Pulse Readings from Last 3 Encounters:   05/10/18 65   11/10/17 65   05/11/17 68       Learning Assessment:  :     Learning Assessment 5/10/2018 5/11/2017 4/23/2015 1/2/2014   PRIMARY LEARNER Patient Patient Patient Patient   HIGHEST LEVEL OF EDUCATION - PRIMARY LEARNER  > 4 YEARS OF COLLEGE - > 4 YEARS OF COLLEGE > 4 YEARS OF COLLEGE   BARRIERS PRIMARY LEARNER NONE - NONE NONE   CO-LEARNER CAREGIVER No - No No   PRIMARY LANGUAGE ENGLISH ENGLISH ENGLISH ENGLISH    NEED - - No No   LEARNER PREFERENCE PRIMARY PICTURES DEMONSTRATION DEMONSTRATION OTHER (COMMENT)     - - PICTURES -     - - READING -   LEARNING SPECIAL TOPICS - - no no   ANSWERED BY patient patient patient patient   RELATIONSHIP SELF SELF SELF SELF       Depression Screening:  :     PHQ over the last two weeks 5/10/2018   Little interest or pleasure in doing things More than half the days   Feeling down, depressed or hopeless Several days   Total Score PHQ 2 3       Fall Risk Assessment:  :     Fall Risk Assessment, last 12 mths 5/10/2018   Able to walk? Yes   Fall in past 12 months? No       Abuse Screening:  :     Abuse Screening Questionnaire 5/10/2018 11/10/2017 4/23/2015   Do you ever feel afraid of your partner? N N N   Are you in a relationship with someone who physically or mentally threatens you? N N N   Is it safe for you to go home?  Cher Rand Plan: Once patient completes Bactrim DS PO BID x 1 additional day, he will continue Bactrim DS 1 tab daily for his acne treatment until he returns in 2 months. Detail Level: Detailed

## 2021-06-11 NOTE — H&P
Cardiology History and Physical 
 
 Date of  Admission: 1/14/2019 Admission type: Emergency Dante Babcock is a 80 y.o. female admitted for [facility]. She started experiencing SS chest tightness last night. It came back this am and she was concerned that she might be having an MI. Her SS chest tightness is described as 'twinges\" but it came back again and again last night. It made her weak but no SOB or dizziness or palpitations. Her past cardiac data include CAD, s/p 3 vs CABG in 2/02, NSTEMI, HTN/hyperlipidemia. Her past non-invasive tests included stress tests but after last one more than five years ago she never wanted to have another one due to her lying in a narrow table and walking and chemical stress agents, making all her experience very difficult. Her CP was not pleuritic or positional. No radiation of sx. She felt as though it was similar to her previous MI. Patient Active Problem List  
 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  
 HTN (hypertension) 02/02/2012  Hyperlipidemia 02/02/2012  Environmental allergies 02/02/2012  CAD (coronary artery disease) 02/02/2012 Kim Wong MD 
Past Medical History:  
Diagnosis Date  CAD (coronary artery disease)  Environmental allergies 2/2/2012  
 HTN (hypertension) 2/2/2012  Hypercholesteremia  Hyperlipidemia 2/2/2012  Hypertension  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 Past Surgical History:  
Procedure Laterality Date  CARDIAC SURG PROCEDURE UNLIST    
 bypass Family History Problem Relation Age of Onset  Heart Disease Mother  Hypertension Mother  Diabetes Father  Stroke Father  Cancer Brother 61  
     esophageal cancer Prior to Admission medications Medication Sig Start Date End Date Taking? Authorizing Provider  
guaiFENesin (MUCINEX) 1,200 mg Ta12 ER tablet Take 1,200 mg by mouth two (2) times a day. Provider, Historical  
PARoxetine (PAXIL) 10 mg tablet Take 0.5 Tabs by mouth nightly. 5/10/18   Lamont Lion MD  
ZETIA 10 mg tablet TAKE ONE TABLET BY MOUTH DAILY 18   Lamont Lion MD  
metoprolol tartrate (LOPRESSOR) 50 mg tablet TAKE ONE TABLET BY MOUTH TWICE A DAY 17   Giselle Banegas MD  
amLODIPine-valsartan (EXFORGE)  mg per tablet Take 1 Tab by mouth daily. Need office visit for further refills 10/6/15   Lamont Lion MD  
rosuvastatin (CRESTOR) 20 mg tablet Take 1 Tab by mouth nightly. 4/23/15   Lamont Lion MD  
cholecalciferol, vitamin D3, (VITAMIN D3) 2,000 unit tab Take 1 tablet by mouth daily. Provider, Historical  
aspirin delayed-release 81 mg tablet Take 81 mg by mouth daily. Provider, Historical  
  
Allergies Allergen Reactions  Aspirin Hives Low dose aspirin is ok.  Pcn [Penicillins] Rash Review of Systems Review of Systems Except as noted in HPI, patient denies recent fever or chills, nausea, vomiting, diarrhea, hemoptysis, hematemesis, dysuria, myalgias, focal neurologic symptoms, ecchymosis, angioedema, odynophagia, dysphagia, sore throat, earache,rash, melena, hematochezia, depression, GERD, cold intolerance, petechia, bleeding gums, or significant weight loss. A comprehensive review of systems was negative except for that written in the HPI. Physical Exam 
 
Objective:  
 Physical Exam: 
24 hr VS reviewed, overall VSSAF Temp (24hrs), Av.3 °F (36.8 °C), Min:98.3 °F (36.8 °C), Max:98.3 °F (36.8 °C) Patient Vitals for the past 8 hrs: 
 Pulse 19 1447 87  
19 1438 77 Patient Vitals for the past 8 hrs: 
 Resp  
19 1447 17 Patient Vitals for the past 8 hrs: 
 BP  
19 1447 155/63 No intake or output data in the 24 hours ending 19 0906 General Appearance:   [x] well developed, well nourished,  [x]NAD. []     agitated   []     lethargic but arousable  []     obtunded ENT, Palate:    [x] WNL   []     dry palate/MM     [x]anicteric Respiratory:    [x]CTA bilateral  [] rales  [] rhonchi  [] normal resp effort Cardiovascular:   [x] RRR   [] Irregular rate and rhythm  [] ectopy [x] Normal S1, S2   [x]  No gallop or rub. [] no murmur   [] murmur c/w: 
 [x]  no edema RLE:[]1+ [] 2+ []3+; LLE:[]1+ []2+ [] 3+ [x]  normal JVP   []     Elevated JVP [x] carotid upstroke nl 
 [x] abd aorta not palpated 
 [x] no stigmata of peripheral emboli GI:    [x] abd soft, non-distented,bowel sounds present, no                     organomegaly appreciated Skin: 
Neuro: 
 
  [x]  warm and dry []     cold extremities [x]  A/O x 3,  [x]     grossly non-focal  
 []  lethargic but arousable  [] obtunded Cardiographics Telemetry: normal sinus rhythm ECG: normal EKG, normal sinus rhythm, unchanged from previous tracings Echocardiogram: Not done Labs:  
Recent Results (from the past 24 hour(s)) EKG, 12 LEAD, INITIAL Collection Time: 01/14/19  2:47 PM  
Result Value Ref Range Ventricular Rate 77 BPM  
 Atrial Rate 77 BPM  
 P-R Interval 164 ms QRS Duration 78 ms Q-T Interval 352 ms QTC Calculation (Bezet) 398 ms Calculated P Axis 81 degrees Calculated R Axis 6 degrees Calculated T Axis 73 degrees Diagnosis Normal sinus rhythm When compared with ECG of 06-JAN-2012 03:57, 
T wave inversion no longer evident in Anterior leads Confirmed by Flores Arana M.D., Dereck Butts ([id number]) on 1/14/2019 4:40:49 PM 
  
CBC WITH AUTOMATED DIFF Collection Time: 01/14/19  2:54 PM  
Result Value Ref Range WBC 7.7 3.6 - 11.0 K/uL  
 RBC 4.50 3.80 - 5.20 M/uL  
 HGB 13.2 11.5 - 16.0 g/dL HCT 42.4 35.0 - 47.0 % MCV 94.2 80.0 - 99.0 FL  
 MCH 29.3 26.0 - 34.0 PG  
 MCHC 31.1 30.0 - 36.5 g/dL  
 RDW 12.0 11.5 - 14.5 % done PLATELET 702 211 - 062 K/uL MPV 10.6 8.9 - 12.9 FL  
 NRBC 0.0 0  WBC ABSOLUTE NRBC 0.00 0.00 - 0.01 K/uL NEUTROPHILS 68 32 - 75 % LYMPHOCYTES 23 12 - 49 % MONOCYTES 8 5 - 13 % EOSINOPHILS 1 0 - 7 % BASOPHILS 0 0 - 1 % IMMATURE GRANULOCYTES 0 0.0 - 0.5 % ABS. NEUTROPHILS 5.2 1.8 - 8.0 K/UL  
 ABS. LYMPHOCYTES 1.7 0.8 - 3.5 K/UL  
 ABS. MONOCYTES 0.6 0.0 - 1.0 K/UL  
 ABS. EOSINOPHILS 0.1 0.0 - 0.4 K/UL  
 ABS. BASOPHILS 0.0 0.0 - 0.1 K/UL  
 ABS. IMM. GRANS. 0.0 0.00 - 0.04 K/UL  
 DF AUTOMATED    
CK W/ REFLX CKMB Collection Time: 01/14/19  2:54 PM  
Result Value Ref Range  26 - 255 U/L  
METABOLIC PANEL, BASIC Collection Time: 01/14/19  2:54 PM  
Result Value Ref Range Sodium 138 136 - 145 mmol/L Potassium 4.4 3.5 - 5.1 mmol/L Chloride 106 97 - 108 mmol/L  
 CO2 27 21 - 32 mmol/L Anion gap 5 5 - 15 mmol/L Glucose 139 (H) 65 - 100 mg/dL BUN 24 (H) 6 - 20 MG/DL Creatinine 1.13 (H) 0.55 - 1.02 MG/DL  
 BUN/Creatinine ratio 21 (H) 12 - 20 GFR est AA 55 (L) >60 ml/min/1.73m2 GFR est non-AA 46 (L) >60 ml/min/1.73m2 Calcium 11.6 (H) 8.5 - 10.1 MG/DL  
SAMPLES BEING HELD Collection Time: 01/14/19  2:54 PM  
Result Value Ref Range SAMPLES BEING HELD 1RED 1BLU   
 COMMENT Add-on orders for these samples will be processed based on acceptable specimen integrity and analyte stability, which may vary by analyte. TROPONIN I Collection Time: 01/14/19  2:54 PM  
Result Value Ref Range Troponin-I, Qt. <0.05 <0.05 ng/mL Assessment: 
 
 Assessment:  
  
Active Problems: * No active hospital problems. * 
CP; consistent with [place] with abnormal ekg CAD; s/p remote CABG; suspect graft failure HTN Hyperlipidemia ARF/CKD; I believe she is on dry side; needs hydration Plan:  
Tele IVF NTP 
ASA/BB Lovenox She needs cardiac cath tomorrow Signed By: Amalia Boston MD   
 January 14, 2019

## 2021-10-15 LAB — EF %, EXTERNAL: NORMAL

## 2021-12-03 ENCOUNTER — TELEPHONE (OUTPATIENT)
Dept: INTERNAL MEDICINE CLINIC | Age: 86
End: 2021-12-03

## 2021-12-03 NOTE — TELEPHONE ENCOUNTER
Spoke to the patient she states that she has been having knee pain for a week, she states that it is also stiff. She would like to schedule and appointment with Dr. Leslie Urban, as she has not been seen since 2019.

## 2021-12-03 NOTE — TELEPHONE ENCOUNTER
Patient states that she has been having knee pain for a week, she states that it is also stiff. She would like to schedule and appointment with Dr. Yoli Ayoub, as she has not been seen since 2019.

## 2021-12-03 NOTE — TELEPHONE ENCOUNTER
Reason for call:  Pt calling re knees. Would not elaborate further.     Is this a new problem: no     Date of last appointment:  Visit date not found     Can we respond via WeYAPhart: no    Best call back number: [phone]

## 2021-12-03 NOTE — TELEPHONE ENCOUNTER
----- Message from Miguel Arrington sent at 12/3/2021 11:11 AM EST -----  Subject: Message to Provider    QUESTIONS  Information for Provider? patient is returning a a call to Elizabet Zamudio,   but we could not get through. Is requesting a call back, when she is   available.  ---------------------------------------------------------------------------  --------------  CALL BACK INFO  What is the best way for the office to contact you? OK to leave message on   voicemail  Preferred Call Back Phone Number? [phone]  ---------------------------------------------------------------------------  --------------  SCRIPT ANSWERS  Relationship to Patient?  Self

## 2021-12-09 ENCOUNTER — OFFICE VISIT (OUTPATIENT)
Dept: INTERNAL MEDICINE CLINIC | Age: 86
End: 2021-12-09
Payer: MEDICARE

## 2021-12-09 VITALS
WEIGHT: 104.6 LBS | SYSTOLIC BLOOD PRESSURE: 147 MMHG | TEMPERATURE: 97.1 F | BODY MASS INDEX: 19.25 KG/M2 | OXYGEN SATURATION: 98 % | HEIGHT: 62 IN | DIASTOLIC BLOOD PRESSURE: 82 MMHG | RESPIRATION RATE: 16 BRPM | HEART RATE: 66 BPM

## 2021-12-09 DIAGNOSIS — G89.29 CHRONIC PAIN OF RIGHT KNEE: Primary | ICD-10-CM

## 2021-12-09 DIAGNOSIS — M25.561 CHRONIC PAIN OF RIGHT KNEE: Primary | ICD-10-CM

## 2021-12-09 PROCEDURE — G8420 CALC BMI NORM PARAMETERS: HCPCS | Performed by: INTERNAL MEDICINE

## 2021-12-09 PROCEDURE — G8510 SCR DEP NEG, NO PLAN REQD: HCPCS | Performed by: INTERNAL MEDICINE

## 2021-12-09 PROCEDURE — 1101F PT FALLS ASSESS-DOCD LE1/YR: CPT | Performed by: INTERNAL MEDICINE

## 2021-12-09 PROCEDURE — G8536 NO DOC ELDER MAL SCRN: HCPCS | Performed by: INTERNAL MEDICINE

## 2021-12-09 PROCEDURE — G8427 DOCREV CUR MEDS BY ELIG CLIN: HCPCS | Performed by: INTERNAL MEDICINE

## 2021-12-09 PROCEDURE — 1090F PRES/ABSN URINE INCON ASSESS: CPT | Performed by: INTERNAL MEDICINE

## 2021-12-09 PROCEDURE — 99213 OFFICE O/P EST LOW 20 MIN: CPT | Performed by: INTERNAL MEDICINE

## 2021-12-09 NOTE — PROGRESS NOTES
Assessment/Plan:     1. Chronic pain of right knee  -likely from arthritis. -recommended use of tylenol arthritis 2 tablets every 8 hours as needed for pain. Follow-up Disposition:         Disclaimer:  Return if symptoms worsen or fail to improve. Advised patient to call back or return to office if symptoms worsen/change/persist.     Discussed expected course/resolution/complications of diagnosis in detail with patient. Medication risks/benefits/costs/interactions/alternatives discussed with patient. Patient was given an after visit summary which includes diagnoses, current medications, & vitals. Patient expressed understanding with the diagnosis and plan. Subjective:      Liam Carter is a 80 y.o. female who presents today for right knee pain.      -knee hurting. Started 2 weeks ago. Felt knee slips out of place, but normally slips back by itself. Right knee      -was following with Dr. Yancey Severs - has follow up with new provider in February, 2021. Objective:       Visit Vitals  BP (!) 147/82   Pulse 66   Temp 97.1 °F (36.2 °C) (Temporal)   Resp 16   Ht 5' 1.5\" (1.562 m)   Wt 104 lb 9.6 oz (47.4 kg)   SpO2 98%   BMI 19.44 kg/m²     General appearance: alert, cooperative, no distress, appears stated age  Head: Normocephalic, without obvious abnormality, atraumatic  Extremities: no edema in lower legs. Crepitus in bilateral knees. No effusion in knees. No cords noted.

## 2022-03-19 PROBLEM — I20.0 UNSTABLE ANGINA (HCC): Status: ACTIVE | Noted: 2019-01-14

## 2023-03-02 ENCOUNTER — OFFICE VISIT (OUTPATIENT)
Dept: INTERNAL MEDICINE CLINIC | Age: 88
End: 2023-03-02
Payer: MEDICARE

## 2023-03-02 VITALS
BODY MASS INDEX: 18.58 KG/M2 | RESPIRATION RATE: 16 BRPM | TEMPERATURE: 97.5 F | WEIGHT: 101 LBS | SYSTOLIC BLOOD PRESSURE: 168 MMHG | HEART RATE: 65 BPM | OXYGEN SATURATION: 93 % | HEIGHT: 62 IN | DIASTOLIC BLOOD PRESSURE: 74 MMHG

## 2023-03-02 DIAGNOSIS — R05.8 OTHER COUGH: ICD-10-CM

## 2023-03-02 DIAGNOSIS — J11.00 INFLUENZA AND PNEUMONIA: Primary | ICD-10-CM

## 2023-03-02 PROCEDURE — G8510 SCR DEP NEG, NO PLAN REQD: HCPCS | Performed by: INTERNAL MEDICINE

## 2023-03-02 PROCEDURE — 1090F PRES/ABSN URINE INCON ASSESS: CPT | Performed by: INTERNAL MEDICINE

## 2023-03-02 PROCEDURE — G8420 CALC BMI NORM PARAMETERS: HCPCS | Performed by: INTERNAL MEDICINE

## 2023-03-02 PROCEDURE — G8536 NO DOC ELDER MAL SCRN: HCPCS | Performed by: INTERNAL MEDICINE

## 2023-03-02 PROCEDURE — 1101F PT FALLS ASSESS-DOCD LE1/YR: CPT | Performed by: INTERNAL MEDICINE

## 2023-03-02 PROCEDURE — G8427 DOCREV CUR MEDS BY ELIG CLIN: HCPCS | Performed by: INTERNAL MEDICINE

## 2023-03-02 PROCEDURE — 99213 OFFICE O/P EST LOW 20 MIN: CPT | Performed by: INTERNAL MEDICINE

## 2023-03-02 RX ORDER — BENZONATATE 100 MG/1
100 CAPSULE ORAL
Qty: 21 CAPSULE | Refills: 0 | Status: SHIPPED | OUTPATIENT
Start: 2023-03-02 | End: 2023-03-09

## 2023-03-02 NOTE — PROGRESS NOTES
Assessment/Plan:     1. Influenza and pneumonia  -patient brought [facility] Report that noted positive for influenza B and also chest xray showing patchy left basilar infiltrate.   -complete course of antibiotics. She isn't able to tell me which antibiotic she stopped, I suspect it is the doxycycline as it is more likely to cause GI distress.    -encouraged rest and hydration    2. Other cough  -given tessalon perles 100mg tid prn to help calm her cough. Follow-up Disposition:       Disclaimer:  Return if symptoms worsen or fail to improve. Advised patient to call back or return to office if symptoms worsen/change/persist.     Discussed expected course/resolution/complications of diagnosis in detail with patient. Medication risks/benefits/costs/interactions/alternatives discussed with patient. Patient was given an after visit summary which includes diagnoses, current medications, & vitals. Patient expressed understanding with the diagnosis and plan. Subjective:      Ike Mccarthy is a 80 y.o. female who presents today for cough      -she was seen at [facility] on 2/28/2023 and diagnosed with influenza B and pneumonia. Was given doxycycline and omnicef course for 7 days. She states that she had to stop one of them because it caused significant stomach pains.    She is concerned because she is still coughing     Objective:       Visit Vitals  BP (!) 168/74 Comment: manual   Pulse 65   Temp 97.5 °F (36.4 °C) (Temporal)   Resp 16   Ht 5' 1.5\" (1.562 m)   Wt 101 lb (45.8 kg)   SpO2 93%   BMI 18.77 kg/m²     General appearance: alert, cooperative, no distress, appears stated age  Head: Normocephalic, without obvious abnormality, atraumatic  Lungs: clear to auscultation bilaterally  Heart: regular rate and rhythm, systolic murmur: systolic ejection 3/6,  at 2nd left intercostal space

## 2023-03-02 NOTE — PROGRESS NOTES
Verified name and birth date for privacy precautions. Chart reviewed in preparation for today's visit.      Chief Complaint   Patient presents with    Cold Symptoms          Health Maintenance Due   Topic    COVID-19 Vaccine (1)    DTaP/Tdap/Td series (1 - Tdap)    Shingles Vaccine (1 of 2)    Medicare Yearly Exam     Lipid Screen     Flu Vaccine (1)    Depression Screen          Wt Readings from Last 3 Encounters:   03/02/23 101 lb (45.8 kg)   12/09/21 104 lb 9.6 oz (47.4 kg)   09/24/19 107 lb 6.4 oz (48.7 kg)     Temp Readings from Last 3 Encounters:   03/02/23 97.5 °F (36.4 °C) (Temporal)   12/09/21 97.1 °F (36.2 °C) (Temporal)   09/24/19 98.7 °F (37.1 °C) (Oral)     BP Readings from Last 3 Encounters:   03/02/23 (!) 168/74   12/09/21 (!) 147/82   09/24/19 136/70     Pulse Readings from Last 3 Encounters:   03/02/23 65   12/09/21 66   09/24/19 (!) 59         Learning Assessment:  :     Learning Assessment 1/7/2019 5/10/2018 5/11/2017 4/23/2015 1/2/2014   PRIMARY LEARNER Patient Patient Patient Patient Patient   HIGHEST LEVEL OF EDUCATION - PRIMARY LEARNER  > 4 YEARS OF COLLEGE > 4 YEARS OF COLLEGE - > 4 YEARS OF COLLEGE > 4 YEARS OF COLLEGE   BARRIERS PRIMARY LEARNER NONE NONE - NONE NONE   CO-LEARNER CAREGIVER - No - No No   PRIMARY LANGUAGE ENGLISH ENGLISH ENGLISH ENGLISH ENGLISH    NEED - - - No No   LEARNER PREFERENCE PRIMARY DEMONSTRATION PICTURES DEMONSTRATION DEMONSTRATION OTHER (COMMENT)     - - - PICTURES -     - - - READING -   LEARNING SPECIAL TOPICS - - - no no   ANSWERED BY patient patient patient patient patient   RELATIONSHIP SELF SELF SELF SELF SELF       Depression Screening:  :     3 most recent PHQ Screens 3/2/2023   Little interest or pleasure in doing things Not at all   Feeling down, depressed, irritable, or hopeless Not at all   Total Score PHQ 2 0   Trouble falling or staying asleep, or sleeping too much -   Feeling tired or having little energy -   Poor appetite, weight loss, or overeating -   Feeling bad about yourself - or that you are a failure or have let yourself or your family down -   Trouble concentrating on things such as school, work, reading, or watching TV -   Moving or speaking so slowly that other people could have noticed; or the opposite being so fidgety that others notice -   Thoughts of being better off dead, or hurting yourself in some way -   PHQ 9 Score -       Fall Risk Assessment:  :     Fall Risk Assessment, last 12 mths 3/2/2023   Able to walk? Yes   Fall in past 12 months? 0   Do you feel unsteady? 0   Are you worried about falling 0   Is TUG test greater than 12 seconds? -   Is the gait abnormal? -   Number of falls in past 12 months -       Abuse Screening:  :     Abuse Screening Questionnaire 3/2/2023 1/7/2019 5/10/2018 11/10/2017 4/23/2015   Do you ever feel afraid of your partner? N N N N N   Are you in a relationship with someone who physically or mentally threatens you? N N N N N   Is it safe for you to go home?  Peter Garland

## 2023-08-02 ENCOUNTER — HOSPITAL ENCOUNTER (INPATIENT)
Facility: HOSPITAL | Age: 88
LOS: 2 days | Discharge: HOME | End: 2023-08-04
Attending: EMERGENCY MEDICINE | Admitting: SURGERY
Payer: MEDICARE

## 2023-08-02 ENCOUNTER — APPOINTMENT (OUTPATIENT)
Facility: HOSPITAL | Age: 88
End: 2023-08-02
Payer: MEDICARE

## 2023-08-02 DIAGNOSIS — K56.609 SBO (SMALL BOWEL OBSTRUCTION) (HCC): Primary | ICD-10-CM

## 2023-08-02 LAB
ALBUMIN SERPL-MCNC: 3.8 G/DL (ref 3.5–5)
ALBUMIN/GLOB SERPL: 0.8 (ref 1.1–2.2)
ALP SERPL-CCNC: 101 U/L (ref 45–117)
ALT SERPL-CCNC: 22 U/L (ref 12–78)
ANION GAP SERPL CALC-SCNC: 1 MMOL/L (ref 5–15)
APPEARANCE UR: CLEAR
AST SERPL-CCNC: ABNORMAL U/L (ref 15–37)
BACTERIA URNS QL MICRO: NEGATIVE /HPF
BASOPHILS # BLD: 0 K/UL (ref 0–0.1)
BASOPHILS NFR BLD: 0 % (ref 0–1)
BILIRUB SERPL-MCNC: 0.6 MG/DL (ref 0.2–1)
BILIRUB UR QL: NEGATIVE
BUN SERPL-MCNC: 25 MG/DL (ref 6–20)
BUN/CREAT SERPL: 22 (ref 12–20)
CALCIUM SERPL-MCNC: 12.2 MG/DL (ref 8.5–10.1)
CHLORIDE SERPL-SCNC: 108 MMOL/L (ref 97–108)
CO2 SERPL-SCNC: 25 MMOL/L (ref 21–32)
COLOR UR: ABNORMAL
CREAT SERPL-MCNC: 1.12 MG/DL (ref 0.55–1.02)
DIFFERENTIAL METHOD BLD: NORMAL
EOSINOPHIL # BLD: 0.1 K/UL (ref 0–0.4)
EOSINOPHIL NFR BLD: 1 % (ref 0–7)
EPITH CASTS URNS QL MICRO: ABNORMAL /LPF
ERYTHROCYTE [DISTWIDTH] IN BLOOD BY AUTOMATED COUNT: 12.3 % (ref 11.5–14.5)
GLOBULIN SER CALC-MCNC: 5 G/DL (ref 2–4)
GLUCOSE SERPL-MCNC: 168 MG/DL (ref 65–100)
GLUCOSE UR STRIP.AUTO-MCNC: NEGATIVE MG/DL
HCT VFR BLD AUTO: 42.9 % (ref 35–47)
HGB BLD-MCNC: 13.4 G/DL (ref 11.5–16)
HGB UR QL STRIP: NEGATIVE
HYALINE CASTS URNS QL MICRO: ABNORMAL /LPF (ref 0–2)
IMM GRANULOCYTES # BLD AUTO: 0 K/UL (ref 0–0.04)
IMM GRANULOCYTES NFR BLD AUTO: 0 % (ref 0–0.5)
KETONES UR QL STRIP.AUTO: NEGATIVE MG/DL
LEUKOCYTE ESTERASE UR QL STRIP.AUTO: NEGATIVE
LIPASE SERPL-CCNC: 88 U/L (ref 73–393)
LYMPHOCYTES # BLD: 1.7 K/UL (ref 0.8–3.5)
LYMPHOCYTES NFR BLD: 19 % (ref 12–49)
MCH RBC QN AUTO: 29.3 PG (ref 26–34)
MCHC RBC AUTO-ENTMCNC: 31.2 G/DL (ref 30–36.5)
MCV RBC AUTO: 93.9 FL (ref 80–99)
MONOCYTES # BLD: 0.5 K/UL (ref 0–1)
MONOCYTES NFR BLD: 6 % (ref 5–13)
NEUTS SEG # BLD: 6.5 K/UL (ref 1.8–8)
NEUTS SEG NFR BLD: 74 % (ref 32–75)
NITRITE UR QL STRIP.AUTO: NEGATIVE
NRBC # BLD: 0 K/UL (ref 0–0.01)
NRBC BLD-RTO: 0 PER 100 WBC
PH UR STRIP: 5.5 (ref 5–8)
PLATELET # BLD AUTO: 200 K/UL (ref 150–400)
PMV BLD AUTO: 10.3 FL (ref 8.9–12.9)
POTASSIUM SERPL-SCNC: ABNORMAL MMOL/L (ref 3.5–5.1)
PROT SERPL-MCNC: 8.8 G/DL (ref 6.4–8.2)
PROT UR STRIP-MCNC: 300 MG/DL
RBC # BLD AUTO: 4.57 M/UL (ref 3.8–5.2)
RBC #/AREA URNS HPF: ABNORMAL /HPF (ref 0–5)
SODIUM SERPL-SCNC: 134 MMOL/L (ref 136–145)
SP GR UR REFRACTOMETRY: 1.02 (ref 1–1.03)
URINE CULTURE IF INDICATED: ABNORMAL
UROBILINOGEN UR QL STRIP.AUTO: 1 EU/DL (ref 0.2–1)
WBC # BLD AUTO: 8.8 K/UL (ref 3.6–11)
WBC URNS QL MICRO: ABNORMAL /HPF (ref 0–4)

## 2023-08-02 PROCEDURE — 2500000003 HC RX 250 WO HCPCS: Performed by: SURGERY

## 2023-08-02 PROCEDURE — 74018 RADEX ABDOMEN 1 VIEW: CPT

## 2023-08-02 PROCEDURE — 2580000003 HC RX 258: Performed by: EMERGENCY MEDICINE

## 2023-08-02 PROCEDURE — 2580000003 HC RX 258: Performed by: SURGERY

## 2023-08-02 PROCEDURE — 36415 COLL VENOUS BLD VENIPUNCTURE: CPT

## 2023-08-02 PROCEDURE — 6370000000 HC RX 637 (ALT 250 FOR IP): Performed by: EMERGENCY MEDICINE

## 2023-08-02 PROCEDURE — 99221 1ST HOSP IP/OBS SF/LOW 40: CPT | Performed by: SURGERY

## 2023-08-02 PROCEDURE — 81001 URINALYSIS AUTO W/SCOPE: CPT

## 2023-08-02 PROCEDURE — 99285 EMERGENCY DEPT VISIT HI MDM: CPT

## 2023-08-02 PROCEDURE — 83690 ASSAY OF LIPASE: CPT

## 2023-08-02 PROCEDURE — 80053 COMPREHEN METABOLIC PANEL: CPT

## 2023-08-02 PROCEDURE — 74177 CT ABD & PELVIS W/CONTRAST: CPT

## 2023-08-02 PROCEDURE — 6360000004 HC RX CONTRAST MEDICATION: Performed by: STUDENT IN AN ORGANIZED HEALTH CARE EDUCATION/TRAINING PROGRAM

## 2023-08-02 PROCEDURE — 6360000002 HC RX W HCPCS: Performed by: SURGERY

## 2023-08-02 PROCEDURE — 6360000004 HC RX CONTRAST MEDICATION: Performed by: SURGERY

## 2023-08-02 PROCEDURE — 1100000003 HC PRIVATE W/ TELEMETRY

## 2023-08-02 PROCEDURE — 85025 COMPLETE CBC W/AUTO DIFF WBC: CPT

## 2023-08-02 RX ORDER — SODIUM CHLORIDE 9 MG/ML
INJECTION, SOLUTION INTRAVENOUS PRN
Status: DISCONTINUED | OUTPATIENT
Start: 2023-08-02 | End: 2023-08-04 | Stop reason: HOSPADM

## 2023-08-02 RX ORDER — VALSARTAN 160 MG/1
160 TABLET ORAL DAILY
Status: DISCONTINUED | OUTPATIENT
Start: 2023-08-03 | End: 2023-08-04 | Stop reason: HOSPADM

## 2023-08-02 RX ORDER — LACTULOSE 10 G/15ML
30 SOLUTION ORAL
Status: COMPLETED | OUTPATIENT
Start: 2023-08-02 | End: 2023-08-02

## 2023-08-02 RX ORDER — MORPHINE SULFATE 2 MG/ML
2 INJECTION, SOLUTION INTRAMUSCULAR; INTRAVENOUS
Status: DISCONTINUED | OUTPATIENT
Start: 2023-08-02 | End: 2023-08-04 | Stop reason: HOSPADM

## 2023-08-02 RX ORDER — 0.9 % SODIUM CHLORIDE 0.9 %
500 INTRAVENOUS SOLUTION INTRAVENOUS ONCE
Status: COMPLETED | OUTPATIENT
Start: 2023-08-02 | End: 2023-08-02

## 2023-08-02 RX ORDER — VALSARTAN 160 MG/1
160 TABLET ORAL DAILY
COMMUNITY

## 2023-08-02 RX ORDER — ONDANSETRON 2 MG/ML
4 INJECTION INTRAMUSCULAR; INTRAVENOUS EVERY 6 HOURS PRN
Status: DISCONTINUED | OUTPATIENT
Start: 2023-08-02 | End: 2023-08-04 | Stop reason: HOSPADM

## 2023-08-02 RX ORDER — ONDANSETRON 4 MG/1
4 TABLET, ORALLY DISINTEGRATING ORAL EVERY 8 HOURS PRN
Status: DISCONTINUED | OUTPATIENT
Start: 2023-08-02 | End: 2023-08-04 | Stop reason: HOSPADM

## 2023-08-02 RX ORDER — LORAZEPAM 2 MG/ML
0.5 INJECTION INTRAMUSCULAR EVERY 6 HOURS PRN
Status: DISCONTINUED | OUTPATIENT
Start: 2023-08-02 | End: 2023-08-04 | Stop reason: HOSPADM

## 2023-08-02 RX ORDER — METOPROLOL TARTRATE 50 MG/1
50 TABLET, FILM COATED ORAL 2 TIMES DAILY
Status: DISCONTINUED | OUTPATIENT
Start: 2023-08-02 | End: 2023-08-04 | Stop reason: HOSPADM

## 2023-08-02 RX ORDER — SODIUM CHLORIDE 0.9 % (FLUSH) 0.9 %
5-40 SYRINGE (ML) INJECTION EVERY 12 HOURS SCHEDULED
Status: DISCONTINUED | OUTPATIENT
Start: 2023-08-02 | End: 2023-08-04 | Stop reason: HOSPADM

## 2023-08-02 RX ORDER — HYDRALAZINE HYDROCHLORIDE 20 MG/ML
10 INJECTION INTRAMUSCULAR; INTRAVENOUS EVERY 6 HOURS PRN
Status: DISCONTINUED | OUTPATIENT
Start: 2023-08-02 | End: 2023-08-04 | Stop reason: HOSPADM

## 2023-08-02 RX ORDER — LOSARTAN POTASSIUM 100 MG/1
100 TABLET ORAL DAILY
Status: DISCONTINUED | OUTPATIENT
Start: 2023-08-02 | End: 2023-08-02

## 2023-08-02 RX ORDER — EZETIMIBE 10 MG/1
10 TABLET ORAL NIGHTLY
Status: DISCONTINUED | OUTPATIENT
Start: 2023-08-02 | End: 2023-08-04 | Stop reason: HOSPADM

## 2023-08-02 RX ORDER — SODIUM CHLORIDE 0.9 % (FLUSH) 0.9 %
5-40 SYRINGE (ML) INJECTION PRN
Status: DISCONTINUED | OUTPATIENT
Start: 2023-08-02 | End: 2023-08-04 | Stop reason: HOSPADM

## 2023-08-02 RX ORDER — HEPARIN SODIUM 5000 [USP'U]/ML
5000 INJECTION, SOLUTION INTRAVENOUS; SUBCUTANEOUS 2 TIMES DAILY
Status: DISCONTINUED | OUTPATIENT
Start: 2023-08-02 | End: 2023-08-04 | Stop reason: HOSPADM

## 2023-08-02 RX ORDER — ONDANSETRON 2 MG/ML
4 INJECTION INTRAMUSCULAR; INTRAVENOUS
Status: DISPENSED | OUTPATIENT
Start: 2023-08-02 | End: 2023-08-03

## 2023-08-02 RX ORDER — AMLODIPINE BESYLATE 5 MG/1
10 TABLET ORAL DAILY
Status: DISCONTINUED | OUTPATIENT
Start: 2023-08-02 | End: 2023-08-04 | Stop reason: HOSPADM

## 2023-08-02 RX ORDER — DEXTROSE MONOHYDRATE, SODIUM CHLORIDE, AND POTASSIUM CHLORIDE 50; 1.49; 4.5 G/1000ML; G/1000ML; G/1000ML
INJECTION, SOLUTION INTRAVENOUS CONTINUOUS
Status: DISCONTINUED | OUTPATIENT
Start: 2023-08-02 | End: 2023-08-04 | Stop reason: HOSPADM

## 2023-08-02 RX ADMIN — SODIUM CHLORIDE 500 ML: 9 INJECTION, SOLUTION INTRAVENOUS at 07:32

## 2023-08-02 RX ADMIN — SODIUM CHLORIDE 500 ML: 9 INJECTION, SOLUTION INTRAVENOUS at 04:48

## 2023-08-02 RX ADMIN — ONDANSETRON 4 MG: 2 INJECTION INTRAMUSCULAR; INTRAVENOUS at 08:21

## 2023-08-02 RX ADMIN — IOPAMIDOL 100 ML: 755 INJECTION, SOLUTION INTRAVENOUS at 04:31

## 2023-08-02 RX ADMIN — SODIUM CHLORIDE, PRESERVATIVE FREE 10 ML: 5 INJECTION INTRAVENOUS at 11:03

## 2023-08-02 RX ADMIN — LORAZEPAM 0.5 MG: 2 INJECTION INTRAMUSCULAR; INTRAVENOUS at 13:39

## 2023-08-02 RX ADMIN — DIATRIZOATE MEGLUMINE AND DIATRIZOATE SODIUM 80 ML: 660; 100 LIQUID ORAL; RECTAL at 17:03

## 2023-08-02 RX ADMIN — SODIUM CHLORIDE, PRESERVATIVE FREE 10 ML: 5 INJECTION INTRAVENOUS at 21:17

## 2023-08-02 RX ADMIN — HYDRALAZINE HYDROCHLORIDE 10 MG: 20 INJECTION INTRAMUSCULAR; INTRAVENOUS at 09:20

## 2023-08-02 RX ADMIN — POTASSIUM CHLORIDE, DEXTROSE MONOHYDRATE AND SODIUM CHLORIDE: 150; 5; 450 INJECTION, SOLUTION INTRAVENOUS at 09:43

## 2023-08-02 RX ADMIN — LACTULOSE 30 G: 20 SOLUTION ORAL at 01:45

## 2023-08-02 RX ADMIN — BENZOCAINE, BUTAMBEN, AND TETRACAINE HYDROCHLORIDE 1 SPRAY: .028; .004; .004 AEROSOL, SPRAY TOPICAL at 06:23

## 2023-08-02 RX ADMIN — HEPARIN SODIUM 5000 UNITS: 5000 INJECTION INTRAVENOUS; SUBCUTANEOUS at 21:14

## 2023-08-02 RX ADMIN — ONDANSETRON 4 MG: 2 INJECTION INTRAMUSCULAR; INTRAVENOUS at 13:38

## 2023-08-02 RX ADMIN — HEPARIN SODIUM 5000 UNITS: 5000 INJECTION INTRAVENOUS; SUBCUTANEOUS at 09:21

## 2023-08-02 RX ADMIN — POTASSIUM CHLORIDE, DEXTROSE MONOHYDRATE AND SODIUM CHLORIDE: 150; 5; 450 INJECTION, SOLUTION INTRAVENOUS at 21:03

## 2023-08-02 ASSESSMENT — PAIN SCALES - GENERAL
PAINLEVEL_OUTOF10: 7
PAINLEVEL_OUTOF10: 7
PAINLEVEL_OUTOF10: 0
PAINLEVEL_OUTOF10: 2
PAINLEVEL_OUTOF10: 0

## 2023-08-02 ASSESSMENT — PAIN DESCRIPTION - DESCRIPTORS
DESCRIPTORS: CRAMPING
DESCRIPTORS: CRAMPING

## 2023-08-02 ASSESSMENT — PAIN DESCRIPTION - ORIENTATION
ORIENTATION: RIGHT;LEFT;LOWER
ORIENTATION: LOWER
ORIENTATION: LOWER

## 2023-08-02 ASSESSMENT — ENCOUNTER SYMPTOMS
ABDOMINAL DISTENTION: 1
NAUSEA: 1
ABDOMINAL PAIN: 1
DIARRHEA: 0
CONSTIPATION: 1
VOMITING: 0
VOMITING: 1
NAUSEA: 0
SHORTNESS OF BREATH: 0

## 2023-08-02 ASSESSMENT — LIFESTYLE VARIABLES
HOW OFTEN DO YOU HAVE A DRINK CONTAINING ALCOHOL: NEVER
HOW MANY STANDARD DRINKS CONTAINING ALCOHOL DO YOU HAVE ON A TYPICAL DAY: PATIENT DOES NOT DRINK

## 2023-08-02 ASSESSMENT — PAIN DESCRIPTION - PAIN TYPE
TYPE: ACUTE PAIN

## 2023-08-02 ASSESSMENT — PAIN DESCRIPTION - LOCATION
LOCATION: ABDOMEN

## 2023-08-02 ASSESSMENT — PAIN - FUNCTIONAL ASSESSMENT: PAIN_FUNCTIONAL_ASSESSMENT: 0-10

## 2023-08-02 ASSESSMENT — PAIN DESCRIPTION - ONSET: ONSET: ON-GOING

## 2023-08-02 ASSESSMENT — PAIN DESCRIPTION - FREQUENCY: FREQUENCY: CONTINUOUS

## 2023-08-02 NOTE — ED PROVIDER NOTES
EMERGENCY DEPARTMENT HISTORY AND PHYSICAL EXAM     ----------------------------------------------------------------------------  Please note that this dictation was completed with [employer], the [employer] voice recognition software. Quite often unanticipated grammatical, syntax, homophones, and other interpretive errors are inadvertently transcribed by the computer software. Please disregard these errors. Please excuse any errors that have escaped final proofreading  ----------------------------------------------------------------------------      Date: 8/2/2023  Patient Name: Tim Reyes [address] [place] [address] [employer]     Chief Complaint   Patient presents with    Abdominal Pain     Pt c/o abdominal pain and constipation x2 days. Bryce N/V/D, denies CP SOB. History obtainted from:  Patient    Other independent source of history: family    HPI: Maria Victoria Day is a 80 y.o. female, with significant pmhx of CAD, hypertension, cholesterol, chronic constipation, who presents via private vehicle to the ED with c/o ongoing constipation with episode of periumbilical \"gas pains\" earlier occurred earlier this evening. Notes that that has subsequently subsided but she still is not been able to go the bathroom. Notes that this is not atypical for her and that sometimes she requires a laxative to go the bathroom. Did not take any laxatives today. Denies nausea, vomiting, chest pain or shortness of breath. PCP: Franky Oneil MD    Allergy List:   Allergies   Allergen Reactions    Aspirin Hives     Low dose aspirin is ok. Penicillins Rash         Medications:  No current facility-administered medications for this encounter.      Current Outpatient Medications   Medication Sig Dispense Refill    amLODIPine (NORVASC) 10 MG tablet Take 10 mg by mouth      aspirin 81 MG EC tablet Take 81 mg by mouth daily      cetirizine (ZYRTEC) 10 MG tablet Take 10 mg by mouth daily as needed      clopidogrel (PLAVIX) 75 MG tablet Take 75 mg by mouth daily      ezetimibe (ZETIA) 10 MG tablet Take 10 mg by mouth      irbesartan (AVAPRO) 300 MG tablet Take 300 mg by mouth      metoprolol tartrate (LOPRESSOR) 50 MG tablet Take 50 mg by mouth 2 times daily      rosuvastatin (CRESTOR) 20 MG tablet Take 20 mg by mouth      thiamine 100 MG tablet Take 100 mg by mouth daily           PAST HISTORY       Past Medical History:  Past Medical History:   Diagnosis Date    CAD (coronary artery disease)     CAD (coronary artery disease) 01/15/2019    stents to LAD, LCX graft    Environmental allergies 2012    HTN (hypertension) 2012    Hypercholesteremia     Hyperlipidemia 2012    Hypertension     S/P CABG x 3 2012       Past Surgical History:  Past Surgical History:   Procedure Laterality Date    CAROTID STENT PLACEMENT N/A     TN UNLISTED PROCEDURE CARDIAC SURGERY      bypass       Family History:  Family History   Problem Relation Age of Onset    Diabetes Father     Stroke Father     Cancer Brother 61        esophageal cancer    Heart Disease Mother     Hypertension Mother        Social History:  Social History     Tobacco Use    Smoking status: Former     Packs/day: 0.25     Types: Cigarettes     Quit date: 1975     Years since quittin.6    Smokeless tobacco: Never   Substance Use Topics    Alcohol use: No    Drug use: No       Allergies: Allergies   Allergen Reactions    Aspirin Hives     Low dose aspirin is ok. Penicillins Rash       Records Review:  I reviewed and interpreted the nursing notes and and vital signs from today's visit, as well as the electronic medical record system for any external medical records that were available that may contribute to the patients current condition, including independent interpretation of previous evaluation by primary care for influenza      REVIEW OF SYSTEMS     Review of Systems   Constitutional:  Negative for fever. Respiratory:  Negative for shortness of breath.     Cardiovascular:

## 2023-08-02 NOTE — ED NOTES
Per Dr Catherine Harris, hold PO meds at this time due to nausea.       Nelsy Martin RN  08/02/23 [id number]

## 2023-08-02 NOTE — ED NOTES
Report given to Zackary Barrios RN. Nurse was informed of reason for arrival, vitals, labs, medications, orders, procedures, results, anything left pending and current plan of action. Questions were asked and received prior to departure from the patient.        Corina Bonilla RN  08/02/23 [id number]

## 2023-08-02 NOTE — ED NOTES
Patient out of bed without using call bell;NG tube displaced completely as a result. Linens changed; gown changed; call bell placed on patients lap with the understanding to use it before getting oob on own. Verbalized understanding of this as well. Patients lights dimmed and door will remain open for observation.       Jojo Paez RN  08/02/23 [id number]

## 2023-08-02 NOTE — H&P
HISTORY AND PHYSICAL             Date: 8/2/2023        Patient Name: Korin Vale     YOB: 1931      Age:  80 y.o. Chief Complaint     Chief Complaint   Patient presents with    Abdominal Pain     Pt c/o abdominal pain and constipation x2 days. Bryce N/V/D, denies CP SOB. History Obtained From   patient    History of Present Illness   80year old female who presents with abdominal pain, nausea and vomiting. Pain is described as a dull ache and measures 5/10. It started 2 days ago. Nothing makes it better or worse. Pain is associated with nausea and vomiting. She complains of chronic constipation that has been more severe the past few days. She denies a history of previous abdominal surgery     Past Medical History     Past Medical History:   Diagnosis Date    CAD (coronary artery disease)     CAD (coronary artery disease) 01/15/2019    stents to LAD, LCX graft    Environmental allergies 2/2/2012    HTN (hypertension) 2/2/2012    Hypercholesteremia     Hyperlipidemia 2/2/2012    Hypertension     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        Past Surgical History     Past Surgical History:   Procedure Laterality Date    CAROTID STENT PLACEMENT N/A 2020    CT UNLISTED PROCEDURE CARDIAC SURGERY      bypass        Medications Prior to Admission     Prior to Admission medications    Medication Sig Start Date End Date Taking?  Authorizing Provider   amLODIPine (NORVASC) 10 MG tablet Take 10 mg by mouth    Ar Automatic Reconciliation   aspirin 81 MG EC tablet Take 81 mg by mouth daily    Ar Automatic Reconciliation   cetirizine (ZYRTEC) 10 MG tablet Take 10 mg by mouth daily as needed    Ar Automatic Reconciliation   clopidogrel (PLAVIX) 75 MG tablet Take 75 mg by mouth daily 1/16/19   Ar Automatic Reconciliation   ezetimibe (ZETIA) 10 MG tablet Take 10 mg by mouth 1/16/19   Ar Automatic Reconciliation   irbesartan (AVAPRO) 300 MG tablet Take 300 mg by mouth    Ar Automatic Reconciliation   metoprolol Collection Time: 08/02/23  5:44 AM    Specimen: Urine   Result Value Ref Range    Color, UA YELLOW/STRAW      Appearance CLEAR CLEAR      Specific Gravity, UA 1.020 1.003 - 1.030      pH, Urine 5.5 5.0 - 8.0      Protein,  (A) NEG mg/dL    Glucose, UA Negative NEG mg/dL    Ketones, Urine Negative NEG mg/dL    Bilirubin Urine Negative NEG      Blood, Urine Negative NEG      Urobilinogen, Urine 1.0 0.2 - 1.0 EU/dL    Nitrite, Urine Negative NEG      Leukocyte Esterase, Urine Negative NEG      Urine Culture if Indicated CULTURE NOT INDICATED BY UA RESULT CNI      WBC, UA 0-4 0 - 4 /hpf    RBC, UA 0-5 0 - 5 /hpf    Epithelial Cells UA FEW FEW /lpf    BACTERIA, URINE Negative NEG /hpf    Hyaline Casts, UA 0-2 0 - 2 /lpf        Imaging/Diagnostics Last 24 Hours   XR ABDOMEN (KUB) (SINGLE AP VIEW)    Result Date: 8/2/2023  EXAM:  XR ABDOMEN (KUB) (SINGLE AP VIEW) INDICATION: NG tube placement COMPARISON: None. TECHNIQUE: Supine frontal abdomen (KUB). FINDINGS: No dilated small bowel. NG tube has been placed with the tip projecting over the fundus. NG tube satisfactory position. CT ABDOMEN PELVIS W IV CONTRAST Additional Contrast? Radiologist Recommendation    Result Date: 8/2/2023  EXAM: CT ABDOMEN PELVIS W IV CONTRAST INDICATION: periumbilical pain, n/v COMPARISON: 1/6/2012 CONTRAST: 100 mL of Isovue-370. ORAL CONTRAST: None TECHNIQUE: Following the uneventful intravenous administration of contrast, thin axial images were obtained through the abdomen and pelvis. Coronal and sagittal reconstructions were generated. CT dose reduction was achieved through use of a standardized protocol tailored for this examination and automatic exposure control for dose modulation. FINDINGS: LOWER THORAX: Scarring is stable in the left lower lobe. LIVER: No mass. BILIARY TREE: Gallbladder is within normal limits. CBD is not dilated. SPLEEN: within normal limits. PANCREAS: No mass or ductal dilatation.  ADRENALS: Cystic 17 mm

## 2023-08-02 NOTE — PROGRESS NOTES
Pharmacy Medication Reconciliation     The patient was interviewed regarding current PTA medication list, use and drug allergies;  patient present in room and obtained permission from patient to discuss drug regimen with visitor(s) present. The patient was questioned regarding use of any other inhalers, topical products, over the counter medications, herbal medications, vitamin products or ophthalmic/nasal/otic medication use. Allergy Update: Aspirin and Penicillins    Recommendations/Findings: The following amendments were made to the patient's active medication list on file at [address]:   1) Additions:   +valsartan  2) Deletions:   -irbesartan  -clopidogrel  3) Changes: none  Pertinent Findings: none    Clarified PTA med list with rx query. PTA medication list was corrected to the following:     Prior to Admission Medications   Prescriptions Last Dose Informant Patient Reported?    amLODIPine (NORVASC) 10 MG tablet 8/2/2023 at 900 Outside Pharmacy/PCP Yes   Sig: Take 1 tablet by mouth daily   aspirin 81 MG EC tablet 8/2/2023 at 0900 Outside Pharmacy/PCP Yes   Sig: Take 1 tablet by mouth daily   cetirizine (ZYRTEC) 10 MG tablet Past Week Outside Pharmacy/PCP Yes   Sig: Take 1 tablet by mouth daily as needed   ezetimibe (ZETIA) 10 MG tablet 8/2/2023 at 0900 Outside Pharmacy/PCP Yes   Sig: Take 1 tablet by mouth daily   metoprolol tartrate (LOPRESSOR) 50 MG tablet 8/2/2023 at 0900 Outside Pharmacy/PCP Yes   Sig: Take 1 tablet by mouth 2 times daily   rosuvastatin (CRESTOR) 20 MG tablet 8/1/2023 at 2100 Outside Pharmacy/PCP Yes   Sig: Take 1 tablet by mouth nightly   thiamine 100 MG tablet 8/2/2023 at 0900 Outside Pharmacy/PCP Yes   Sig: Take 1 tablet by mouth daily   valsartan (DIOVAN) 160 MG tablet 8/2/2023 at 0900 Outside Pharmacy/PCP Yes   Sig: Take 1 tablet by mouth daily      Facility-Administered Medications: None        Thank you,  Alvaro Nichole, [facility]

## 2023-08-02 NOTE — CARE COORDINATION
Care Management Initial Assessment       RUR: Not listed on chart at this time   Readmission? No  1st IM letter given? No, to be given by Patient Access  1st  letter given: No       08/02/23 1106   Service Assessment   Patient Orientation Alert and Oriented   Cognition Alert   History Provided By Patient   Primary Caregiver Self   Support Systems Children;Family Members  (Daughters, grandson)   [address] is: Legal Next of Kin  (Pt is , children are [facility])   PCP Verified by CM Yes  (Dr. Kelly Moritz)   Last Visit to PCP Within last 6 months   Prior Functional Level Independent in ADLs/IADLs   Current Functional Level Independent in ADLs/IADLs   Can patient return to prior living arrangement Yes   Family able to assist with home care needs: Yes   Would you like for me to discuss the discharge plan with any other family members/significant others, and if so, who? Yes  (Daughters, [facility] and [facility])   Hoa Robertson   Social/Functional History   Lives With Daughter  Larry John)   Home Layout One level   Home Access Stairs to enter with rails   Entrance Stairs - Number of Steps 2   Bathroom Shower/Tub Shower chair with back   [address] chair   [address] [facility] Help From Family   ADL Assistance Independent   Ambulation Assistance Independent   Transfer Assistance Independent   Active  No   Patient's  Info Daughter, [facility], or grandson   Mode of Transportation Car   Occupation Retired   Discharge Planning   Type of [address] Prior To Admission [employer]   Current DME Prior to [address] [facility]   Patient expects to be discharged to: [employer]       Initial note: CM reviewed chart. CM completed assessment with pt at bedside. CM introduced self, role of CM, verified demographics, and discussed transition of care planning.  Pt

## 2023-08-02 NOTE — ED NOTES
Soap suds enema completed at this time. Patient placed on bedside commode for emptying. Call bell within reach.        Saurav Yi RN  08/02/23 4576

## 2023-08-02 NOTE — ED NOTES
Came in to get the patient off of the bedside commode and she was in the process of vomiting. Patient did state that she passed some stool and the pressure in her stomach has gone down. Patient will be medicated with Zofran.        Pepper Room, RN  08/02/23 2573

## 2023-08-03 ENCOUNTER — APPOINTMENT (OUTPATIENT)
Facility: HOSPITAL | Age: 88
End: 2023-08-03
Payer: MEDICARE

## 2023-08-03 LAB
ANION GAP SERPL CALC-SCNC: 2 MMOL/L (ref 5–15)
BASOPHILS # BLD: 0 K/UL (ref 0–0.1)
BASOPHILS NFR BLD: 0 % (ref 0–1)
BUN SERPL-MCNC: 14 MG/DL (ref 6–20)
BUN/CREAT SERPL: 16 (ref 12–20)
CALCIUM SERPL-MCNC: 10.6 MG/DL (ref 8.5–10.1)
CHLORIDE SERPL-SCNC: 112 MMOL/L (ref 97–108)
CO2 SERPL-SCNC: 23 MMOL/L (ref 21–32)
CREAT SERPL-MCNC: 0.85 MG/DL (ref 0.55–1.02)
DIFFERENTIAL METHOD BLD: ABNORMAL
EOSINOPHIL # BLD: 0 K/UL (ref 0–0.4)
EOSINOPHIL NFR BLD: 0 % (ref 0–7)
ERYTHROCYTE [DISTWIDTH] IN BLOOD BY AUTOMATED COUNT: 12.6 % (ref 11.5–14.5)
GLUCOSE SERPL-MCNC: 136 MG/DL (ref 65–100)
HCT VFR BLD AUTO: 38.2 % (ref 35–47)
HGB BLD-MCNC: 12.1 G/DL (ref 11.5–16)
IMM GRANULOCYTES # BLD AUTO: 0.1 K/UL (ref 0–0.04)
IMM GRANULOCYTES NFR BLD AUTO: 1 % (ref 0–0.5)
LYMPHOCYTES # BLD: 1.2 K/UL (ref 0.8–3.5)
LYMPHOCYTES NFR BLD: 9 % (ref 12–49)
MCH RBC QN AUTO: 30 PG (ref 26–34)
MCHC RBC AUTO-ENTMCNC: 31.7 G/DL (ref 30–36.5)
MCV RBC AUTO: 94.8 FL (ref 80–99)
MONOCYTES # BLD: 0.8 K/UL (ref 0–1)
MONOCYTES NFR BLD: 6 % (ref 5–13)
NEUTS SEG # BLD: 11.8 K/UL (ref 1.8–8)
NEUTS SEG NFR BLD: 84 % (ref 32–75)
NRBC # BLD: 0 K/UL (ref 0–0.01)
NRBC BLD-RTO: 0 PER 100 WBC
PLATELET # BLD AUTO: 186 K/UL (ref 150–400)
PMV BLD AUTO: 10.2 FL (ref 8.9–12.9)
POTASSIUM SERPL-SCNC: 5.7 MMOL/L (ref 3.5–5.1)
RBC # BLD AUTO: 4.03 M/UL (ref 3.8–5.2)
SODIUM SERPL-SCNC: 137 MMOL/L (ref 136–145)
WBC # BLD AUTO: 14 K/UL (ref 3.6–11)

## 2023-08-03 PROCEDURE — 36415 COLL VENOUS BLD VENIPUNCTURE: CPT

## 2023-08-03 PROCEDURE — 74019 RADEX ABDOMEN 2 VIEWS: CPT

## 2023-08-03 PROCEDURE — 80048 BASIC METABOLIC PNL TOTAL CA: CPT

## 2023-08-03 PROCEDURE — 6360000002 HC RX W HCPCS: Performed by: SURGERY

## 2023-08-03 PROCEDURE — 6370000000 HC RX 637 (ALT 250 FOR IP): Performed by: SURGERY

## 2023-08-03 PROCEDURE — 2500000003 HC RX 250 WO HCPCS: Performed by: SURGERY

## 2023-08-03 PROCEDURE — 2500000003 HC RX 250 WO HCPCS: Performed by: NURSE PRACTITIONER

## 2023-08-03 PROCEDURE — 1100000003 HC PRIVATE W/ TELEMETRY

## 2023-08-03 PROCEDURE — 85025 COMPLETE CBC W/AUTO DIFF WBC: CPT

## 2023-08-03 PROCEDURE — 2580000003 HC RX 258: Performed by: SURGERY

## 2023-08-03 RX ADMIN — EZETIMIBE 10 MG: 10 TABLET ORAL at 21:18

## 2023-08-03 RX ADMIN — HEPARIN SODIUM 5000 UNITS: 5000 INJECTION INTRAVENOUS; SUBCUTANEOUS at 21:18

## 2023-08-03 RX ADMIN — POTASSIUM CHLORIDE, DEXTROSE MONOHYDRATE AND SODIUM CHLORIDE: 150; 5; 450 INJECTION, SOLUTION INTRAVENOUS at 21:20

## 2023-08-03 RX ADMIN — HEPARIN SODIUM 5000 UNITS: 5000 INJECTION INTRAVENOUS; SUBCUTANEOUS at 10:54

## 2023-08-03 RX ADMIN — METOPROLOL TARTRATE 50 MG: 50 TABLET ORAL at 10:52

## 2023-08-03 RX ADMIN — SODIUM CHLORIDE, PRESERVATIVE FREE 10 ML: 5 INJECTION INTRAVENOUS at 21:25

## 2023-08-03 RX ADMIN — METOPROLOL TARTRATE 50 MG: 50 TABLET ORAL at 21:18

## 2023-08-03 RX ADMIN — AMLODIPINE BESYLATE 10 MG: 5 TABLET ORAL at 10:52

## 2023-08-03 RX ADMIN — VALSARTAN 160 MG: 160 TABLET, FILM COATED ORAL at 10:52

## 2023-08-03 RX ADMIN — POTASSIUM CHLORIDE, DEXTROSE MONOHYDRATE AND SODIUM CHLORIDE: 150; 5; 450 INJECTION, SOLUTION INTRAVENOUS at 11:01

## 2023-08-03 RX ADMIN — SODIUM CHLORIDE, PRESERVATIVE FREE 10 ML: 5 INJECTION INTRAVENOUS at 10:55

## 2023-08-03 ASSESSMENT — PAIN SCALES - GENERAL
PAINLEVEL_OUTOF10: 0

## 2023-08-03 NOTE — PROGRESS NOTES
1711: Attempted to call report to Lenin Chavez RN on [employer]. She was in another patient's room at the time and will call back. 1731: Lenin Chavez RN spoke with Nursing Supervisor regarding surgeon's note stating that patient may discharge tomorrow. According to Lenin Chavez RN, Supervisor stated that patient can stay on Oncology unit if she's planning to be discharged tomorrow 8/4/23.

## 2023-08-03 NOTE — PROGRESS NOTES
End of Shift Note    Bedside shift change report given to Yesi Baires (oncoming nurse) by Jeanette Dominguez RN (offgoing nurse). Report included the following information SBAR, Kardex, and MAR    Shift worked:  7p-7a     Shift summary and any significant changes:     Zetia and Lopressor were held, patient has been NPO and she did have a NG tube placed in her right nares, she subsequently pulled it out. Patient had huge bowel movement during my shift. A new IV was placed. Patient is on maintenance fluid. Patient had another bowel movement before shift change. I have been suctioning the excess sputum the patient had in her mouth. Family members were present at bedside at shift change. Morning lab was done. Concerns for physician to address:  Patient had a large bowel movement during my shift. In her attempt to get from the bed to the bathroom, she pulled out her NG tube.      Zone phone for oncoming shift:          Activity:     Number times ambulated in hallways past shift: 0  Number of times OOB to chair past shift: 0    Cardiac:   Cardiac Monitoring: No           Access:  Current line(s): PIV     Genitourinary:   Urinary status: voiding    Respiratory:      Chronic home O2 use?: NO  Incentive spirometer at bedside: NO       GI:     Current diet:  Diet NPO Exceptions are: Sips of Water with Meds  Passing flatus: YES  Tolerating current diet: YES       Pain Management:   Patient states pain is manageable on current regimen: YES    Skin:     Interventions: float heels    Patient Safety:  Fall Score:    Interventions: bed/chair alarm, gripper socks, and pt to call before getting OOB       Length of Stay:  Expected LOS: 2  Actual LOS: 1      Jeanette Dominguez RN

## 2023-08-04 VITALS
BODY MASS INDEX: 19.27 KG/M2 | WEIGHT: 104.72 LBS | DIASTOLIC BLOOD PRESSURE: 65 MMHG | HEIGHT: 62 IN | HEART RATE: 65 BPM | SYSTOLIC BLOOD PRESSURE: 159 MMHG | OXYGEN SATURATION: 95 % | RESPIRATION RATE: 18 BRPM | TEMPERATURE: 98.1 F

## 2023-08-04 LAB
ANION GAP SERPL CALC-SCNC: 3 MMOL/L (ref 5–15)
BASOPHILS # BLD: 0 K/UL (ref 0–0.1)
BASOPHILS NFR BLD: 0 % (ref 0–1)
BUN SERPL-MCNC: 7 MG/DL (ref 6–20)
BUN/CREAT SERPL: 9 (ref 12–20)
CALCIUM SERPL-MCNC: 10.6 MG/DL (ref 8.5–10.1)
CHLORIDE SERPL-SCNC: 110 MMOL/L (ref 97–108)
CO2 SERPL-SCNC: 25 MMOL/L (ref 21–32)
CREAT SERPL-MCNC: 0.81 MG/DL (ref 0.55–1.02)
DIFFERENTIAL METHOD BLD: ABNORMAL
EOSINOPHIL # BLD: 0.2 K/UL (ref 0–0.4)
EOSINOPHIL NFR BLD: 3 % (ref 0–7)
ERYTHROCYTE [DISTWIDTH] IN BLOOD BY AUTOMATED COUNT: 12.4 % (ref 11.5–14.5)
GLUCOSE SERPL-MCNC: 121 MG/DL (ref 65–100)
HCT VFR BLD AUTO: 36.5 % (ref 35–47)
HGB BLD-MCNC: 11.3 G/DL (ref 11.5–16)
IMM GRANULOCYTES # BLD AUTO: 0 K/UL (ref 0–0.04)
IMM GRANULOCYTES NFR BLD AUTO: 0 % (ref 0–0.5)
LYMPHOCYTES # BLD: 1.6 K/UL (ref 0.8–3.5)
LYMPHOCYTES NFR BLD: 21 % (ref 12–49)
MCH RBC QN AUTO: 29.3 PG (ref 26–34)
MCHC RBC AUTO-ENTMCNC: 31 G/DL (ref 30–36.5)
MCV RBC AUTO: 94.6 FL (ref 80–99)
MONOCYTES # BLD: 0.6 K/UL (ref 0–1)
MONOCYTES NFR BLD: 7 % (ref 5–13)
NEUTS SEG # BLD: 5.5 K/UL (ref 1.8–8)
NEUTS SEG NFR BLD: 69 % (ref 32–75)
NRBC # BLD: 0 K/UL (ref 0–0.01)
NRBC BLD-RTO: 0 PER 100 WBC
PLATELET # BLD AUTO: 173 K/UL (ref 150–400)
PMV BLD AUTO: 10.2 FL (ref 8.9–12.9)
POTASSIUM SERPL-SCNC: 4.3 MMOL/L (ref 3.5–5.1)
RBC # BLD AUTO: 3.86 M/UL (ref 3.8–5.2)
SODIUM SERPL-SCNC: 138 MMOL/L (ref 136–145)
WBC # BLD AUTO: 8 K/UL (ref 3.6–11)

## 2023-08-04 PROCEDURE — 2500000003 HC RX 250 WO HCPCS: Performed by: NURSE PRACTITIONER

## 2023-08-04 PROCEDURE — 6360000002 HC RX W HCPCS: Performed by: SURGERY

## 2023-08-04 PROCEDURE — 6370000000 HC RX 637 (ALT 250 FOR IP): Performed by: SURGERY

## 2023-08-04 PROCEDURE — 2580000003 HC RX 258: Performed by: SURGERY

## 2023-08-04 PROCEDURE — 36415 COLL VENOUS BLD VENIPUNCTURE: CPT

## 2023-08-04 PROCEDURE — 80048 BASIC METABOLIC PNL TOTAL CA: CPT

## 2023-08-04 PROCEDURE — 85025 COMPLETE CBC W/AUTO DIFF WBC: CPT

## 2023-08-04 PROCEDURE — 99238 HOSP IP/OBS DSCHRG MGMT 30/<: CPT | Performed by: NURSE PRACTITIONER

## 2023-08-04 RX ADMIN — POTASSIUM CHLORIDE, DEXTROSE MONOHYDRATE AND SODIUM CHLORIDE: 150; 5; 450 INJECTION, SOLUTION INTRAVENOUS at 08:15

## 2023-08-04 RX ADMIN — AMLODIPINE BESYLATE 10 MG: 5 TABLET ORAL at 08:22

## 2023-08-04 RX ADMIN — SODIUM CHLORIDE, PRESERVATIVE FREE 10 ML: 5 INJECTION INTRAVENOUS at 08:23

## 2023-08-04 RX ADMIN — VALSARTAN 160 MG: 160 TABLET, FILM COATED ORAL at 08:22

## 2023-08-04 RX ADMIN — METOPROLOL TARTRATE 50 MG: 50 TABLET ORAL at 08:22

## 2023-08-04 RX ADMIN — HEPARIN SODIUM 5000 UNITS: 5000 INJECTION INTRAVENOUS; SUBCUTANEOUS at 08:22

## 2023-08-04 ASSESSMENT — PAIN SCALES - GENERAL: PAINLEVEL_OUTOF10: 0

## 2023-08-04 NOTE — DISCHARGE SUMMARY
Discharge Summary    Patient ID:  Lindsey Funes  [id number]  female  80 y.o.  6/12/1931    Admit date: 8/2/2023    Discharge date: 8/4/2023    Admitting Physician: Rolanda Ratliff MD     Consulting Physician(s):   Treatment Team: Attending Provider: Macho Headley MD; : Nancy Barr; Utilization Reviewer: Lelo Jerome RN; Registered Nurse: Sukhjinder Chu RN; Charge Nurse: Dajuan Melendez RN                         HPI:  Pt is a 80 y.o. female who presents at this time with SBO requiring acute care monitoring and/or treatment. Problem List:   Patient Active Problem List   Diagnosis    Osteoporosis    Environmental allergies    S/P colonoscopy    Unstable angina (HCC)    Vitamin D deficiency    HTN (hypertension)    S/P CABG x 3    Advance directive discussed with patient    H/O bone density study    Hyperlipidemia    CAD (coronary artery disease)    SBO (small bowel obstruction) [facility])        Hospital Course:  Patient was managed conservatively with bowel rest and improved as expected. On the day of discharge, patient was able to tolerate a diet. Pain Management:  Oxycodone     Transfusions:    Number of units banked PRBCs =   none     Activity: Patient mobilized with nursing and was found to be safe and steady with ambulation. Discharged to: Home     Condition on Discharge: Stable     Discharge instructions:    - Take medications as prescribed  - Diet Low Fiber  - Discharge activity:    - Activity as tolerated    - Ambulate several times a day   - Do not drive if taking opioid pain medications    Allergies: Allergies   Allergen Reactions    Aspirin Hives     Low dose aspirin is ok.     Penicillins Rash              -DISCHARGE MEDICATION LIST        Medication List        CONTINUE taking these medications      amLODIPine 10 MG tablet  Commonly known as: NORVASC     aspirin 81 MG EC tablet     cetirizine 10 MG tablet  Commonly known as: ZYRTEC     ezetimibe 10 MG tablet  Commonly known as:

## 2023-08-04 NOTE — CARE COORDINATION
08/04/23 1113   Discharge Planning   Patient expects to be discharged to: [facility] Discharge   Transition of Care Consult (CM Consult) [facility] Discharge None   [facility] Information Provided? No   Mode of Transport at Discharge Other (see comment)  (family to transport)   Confirm Follow Up Transport Family   Condition of Participation: Discharge Planning   The Patient and/or Patient Representative was provided with a Choice of Provider? Patient   The Patient and/Or Patient Representative agree with the Discharge Plan? No   Freedom of Choice list was provided with basic dialogue that supports the patient's individualized plan of care/goals, treatment preferences, and shares the quality data associated with the providers? No         CM informed that pt will d/c on today and will transition home with family support. Pt will d/c late afternoon once diet is tolerated. Pts family will assist with transport on today. CM provided pt with 2nd  Medicare Letter. [facility]EM[facility] completed the need of the pt at this time.     ERICA Mcintosh CM  [phone]

## 2023-08-04 NOTE — PROGRESS NOTES
End of Shift Note    Bedside shift change report given to Sobeida Poe RN (oncoming nurse) by Loraine Mancuso RN (offgoing nurse). Report included the following information SBAR, Kardex, Intake/Output, MAR, and Recent Results    Shift worked:  7 am to 7:30 pm     Shift summary and any significant changes: All scheduled medications administered. Patient denied pain and nausea during shift. IVF rate decreased to 90 mL/hr, see MAR. Abdominal x-ray completed during shift. Patient had several loose bowel movements during shift; bath, gown/linen change, and incontinence care completed. Patient 1 assist to chair and bedside commode. Started on clear liquids; patient tolerated well without nausea and vomiting. IV flushed and patent. Patient's family came to visit during shift. Nursing rounds and education completed. Concerns for physician to address:       Zone phone for oncoming shift:          Activity:     Number times ambulated in hallways past shift: 0  Number of times OOB to chair past shift: 1    Cardiac:   Cardiac Monitoring: No           Access:  Current line(s): PIV     Genitourinary:   Urinary status: voiding    Respiratory:      Chronic home O2 use?: NO  Incentive spirometer at bedside: NO       GI:     Current diet:  ADULT DIET;  Clear Liquid  Passing flatus: YES  Tolerating current diet: YES       Pain Management:   Patient states pain is manageable on current regimen: YES    Skin:     Interventions: float heels and increase time out of bed    Patient Safety:  Fall Score:    Interventions: gripper socks and pt to call before getting OOB       Length of Stay:  Expected LOS: 2  Actual LOS: 1      Loraine Mancuso RN

## 2023-08-04 NOTE — PROGRESS NOTES
End of Shift Note    Bedside shift change report given to Kisha Adrian (oncoming nurse) by Adali Holder RN (offgoing nurse). Report included the following information SBAR, Kardex, and MAR    Shift worked:  7p-7a     Shift summary and any significant changes:     Scheduled medications were given, see MAR. Patient did not complain of any pain during my shift. Family member was present at bedside at change of shift. IV has been flushed and the patient is getting maintenance fluid. Patient is up with the assistance once to the bedside commode. Morning lab was done. Patient teaching and routine rounding has been done. Concerns for physician to address:       Zone phone for oncoming shift:          Activity:     Number times ambulated in hallways past shift: 0  Number of times OOB to chair past shift: 0    Cardiac:   Cardiac Monitoring: No           Access:  Current line(s): PIV     Genitourinary:   Urinary status: voiding    Respiratory:      Chronic home O2 use?: NO  Incentive spirometer at bedside: NO       GI:     Current diet:  ADULT DIET;  Clear Liquid  Passing flatus: YES  Tolerating current diet: YES       Pain Management:   Patient states pain is manageable on current regimen: YES    Skin:     Interventions: float heels and increase time out of bed    Patient Safety:  Fall Score:    Interventions: bed/chair alarm, gripper socks, and pt to call before getting OOB       Length of Stay:  Expected LOS: 2  Actual LOS: 2      Adali Holder RN

## 2023-10-26 ENCOUNTER — TELEPHONE (OUTPATIENT)
Age: 88
End: 2023-10-26

## 2023-10-26 NOTE — TELEPHONE ENCOUNTER
----- Message from Ninfa Ness sent at 10/26/2023  3:18 PM EDT -----  Subject: Message to Provider    QUESTIONS  Information for Provider? Patient is going to the dentist on Monday 10/30   to have her teeth pulled but would like to talk to her doctor about it   before she goes. please call patient as soon as possible   ---------------------------------------------------------------------------  --------------  Theresa OBRREGO  [phone]; OK to leave message on voicemail  ---------------------------------------------------------------------------  --------------  SCRIPT ANSWERS  Relationship to Patient?  Self

## 2023-10-26 NOTE — TELEPHONE ENCOUNTER
Returned call. Patient states she has a dentist appointment on 10-. She wanted to ask Dr Valentina Serra if it is okay to have her teeth pulled.

## 2023-10-30 ENCOUNTER — TELEPHONE (OUTPATIENT)
Age: 88
End: 2023-10-30

## 2023-10-30 NOTE — TELEPHONE ENCOUNTER
----- Message from Luigi Santiago sent at 10/30/2023 12:25 PM EDT -----  Subject: Message to Provider    QUESTIONS  Information for Provider? Patient would like a call back. They saw that   your office called earlier. ---------------------------------------------------------------------------  --------------  Von COLON  [phone]; OK to leave message on voicemail  ---------------------------------------------------------------------------  --------------  SCRIPT ANSWERS  Relationship to Patient?  Self

## 2023-10-30 NOTE — TELEPHONE ENCOUNTER
Attempted to return call. Someone picked up on call and hung up. Per Dr Christie Krueger patient has not been seen in over a year. Okay to have teeth pulled if now new problems.

## 2024-05-01 ENCOUNTER — TRANSCRIBE ORDERS (OUTPATIENT)
Facility: HOSPITAL | Age: 89
End: 2024-05-01

## 2024-05-01 DIAGNOSIS — N18.31 CHRONIC KIDNEY DISEASE, STAGE 3A (HCC): Primary | ICD-10-CM

## 2024-05-15 ENCOUNTER — HOSPITAL ENCOUNTER (OUTPATIENT)
Facility: HOSPITAL | Age: 89
Discharge: HOME | End: 2024-05-18
Payer: MEDICARE

## 2024-05-15 DIAGNOSIS — N18.31 CHRONIC KIDNEY DISEASE, STAGE 3A (HCC): ICD-10-CM

## 2024-05-15 PROCEDURE — 76770 US EXAM ABDO BACK WALL COMP: CPT

## 2024-05-29 ENCOUNTER — APPOINTMENT (OUTPATIENT)
Facility: HOSPITAL | Age: 89
DRG: 689 | End: 2024-05-29
Payer: MEDICARE

## 2024-05-29 ENCOUNTER — HOSPITAL ENCOUNTER (INPATIENT)
Facility: HOSPITAL | Age: 89
LOS: 5 days | Discharge: HOME | DRG: 689 | End: 2024-06-03
Attending: STUDENT IN AN ORGANIZED HEALTH CARE EDUCATION/TRAINING PROGRAM | Admitting: FAMILY MEDICINE
Payer: MEDICARE

## 2024-05-29 DIAGNOSIS — R41.0 DELIRIUM: ICD-10-CM

## 2024-05-29 DIAGNOSIS — N39.0 URINARY TRACT INFECTION WITHOUT HEMATURIA, SITE UNSPECIFIED: Primary | ICD-10-CM

## 2024-05-29 DIAGNOSIS — R62.7 FAILURE TO THRIVE IN ADULT: ICD-10-CM

## 2024-05-29 LAB
ALBUMIN SERPL-MCNC: 3.6 G/DL (ref 3.5–5)
ALBUMIN/GLOB SERPL: 0.8 (ref 1.1–2.2)
ALP SERPL-CCNC: 93 U/L (ref 45–117)
ALT SERPL-CCNC: 29 U/L (ref 12–78)
ANION GAP SERPL CALC-SCNC: 3 MMOL/L (ref 5–15)
APPEARANCE UR: ABNORMAL
AST SERPL-CCNC: 36 U/L (ref 15–37)
BACTERIA URNS QL MICRO: ABNORMAL /HPF
BASOPHILS # BLD: 0 K/UL (ref 0–0.1)
BASOPHILS NFR BLD: 0 % (ref 0–1)
BILIRUB SERPL-MCNC: 0.5 MG/DL (ref 0.2–1)
BILIRUB UR QL: NEGATIVE
BUN SERPL-MCNC: 15 MG/DL (ref 6–20)
BUN/CREAT SERPL: 12 (ref 12–20)
CALCIUM SERPL-MCNC: 12 MG/DL (ref 8.5–10.1)
CHLORIDE SERPL-SCNC: 107 MMOL/L (ref 97–108)
CO2 SERPL-SCNC: 32 MMOL/L (ref 21–32)
COLOR UR: ABNORMAL
COMMENT:: NORMAL
CREAT SERPL-MCNC: 1.26 MG/DL (ref 0.55–1.02)
DIFFERENTIAL METHOD BLD: NORMAL
EOSINOPHIL # BLD: 0.1 K/UL (ref 0–0.4)
EOSINOPHIL NFR BLD: 1 % (ref 0–7)
EPITH CASTS URNS QL MICRO: ABNORMAL /LPF
ERYTHROCYTE [DISTWIDTH] IN BLOOD BY AUTOMATED COUNT: 11.9 % (ref 11.5–14.5)
GLOBULIN SER CALC-MCNC: 4.6 G/DL (ref 2–4)
GLUCOSE SERPL-MCNC: 115 MG/DL (ref 65–100)
GLUCOSE UR STRIP.AUTO-MCNC: NEGATIVE MG/DL
HCT VFR BLD AUTO: 37.2 % (ref 35–47)
HGB BLD-MCNC: 12.6 G/DL (ref 11.5–16)
HGB UR QL STRIP: ABNORMAL
HYALINE CASTS URNS QL MICRO: ABNORMAL /LPF (ref 0–5)
IMM GRANULOCYTES # BLD AUTO: 0 K/UL (ref 0–0.04)
IMM GRANULOCYTES NFR BLD AUTO: 0 % (ref 0–0.5)
KETONES UR QL STRIP.AUTO: NEGATIVE MG/DL
LEUKOCYTE ESTERASE UR QL STRIP.AUTO: ABNORMAL
LYMPHOCYTES # BLD: 1.6 K/UL (ref 0.8–3.5)
LYMPHOCYTES NFR BLD: 19 % (ref 12–49)
MCH RBC QN AUTO: 30.3 PG (ref 26–34)
MCHC RBC AUTO-ENTMCNC: 33.9 G/DL (ref 30–36.5)
MCV RBC AUTO: 89.4 FL (ref 80–99)
MONOCYTES # BLD: 0.6 K/UL (ref 0–1)
MONOCYTES NFR BLD: 7 % (ref 5–13)
NEUTS SEG # BLD: 6 K/UL (ref 1.8–8)
NEUTS SEG NFR BLD: 73 % (ref 32–75)
NITRITE UR QL STRIP.AUTO: NEGATIVE
NRBC # BLD: 0 K/UL (ref 0–0.01)
NRBC BLD-RTO: 0 PER 100 WBC
PH UR STRIP: 8 (ref 5–8)
PLATELET # BLD AUTO: 189 K/UL (ref 150–400)
PMV BLD AUTO: 9.9 FL (ref 8.9–12.9)
POTASSIUM SERPL-SCNC: 3.3 MMOL/L (ref 3.5–5.1)
PROCALCITONIN SERPL-MCNC: 0.05 NG/ML
PROT SERPL-MCNC: 8.2 G/DL (ref 6.4–8.2)
PROT UR STRIP-MCNC: 100 MG/DL
RBC # BLD AUTO: 4.16 M/UL (ref 3.8–5.2)
RBC #/AREA URNS HPF: ABNORMAL /HPF (ref 0–5)
SODIUM SERPL-SCNC: 142 MMOL/L (ref 136–145)
SP GR UR REFRACTOMETRY: 1.01 (ref 1–1.03)
SPECIMEN HOLD: NORMAL
TROPONIN I SERPL HS-MCNC: 14 NG/L (ref 0–51)
URINE CULTURE IF INDICATED: ABNORMAL
UROBILINOGEN UR QL STRIP.AUTO: 1 EU/DL (ref 0.2–1)
WBC # BLD AUTO: 8.3 K/UL (ref 3.6–11)
WBC URNS QL MICRO: ABNORMAL /HPF (ref 0–4)

## 2024-05-29 PROCEDURE — 2580000003 HC RX 258: Performed by: STUDENT IN AN ORGANIZED HEALTH CARE EDUCATION/TRAINING PROGRAM

## 2024-05-29 PROCEDURE — 87086 URINE CULTURE/COLONY COUNT: CPT

## 2024-05-29 PROCEDURE — 2060000000 HC ICU INTERMEDIATE R&B

## 2024-05-29 PROCEDURE — 6360000002 HC RX W HCPCS: Performed by: STUDENT IN AN ORGANIZED HEALTH CARE EDUCATION/TRAINING PROGRAM

## 2024-05-29 PROCEDURE — 87186 SC STD MICRODIL/AGAR DIL: CPT

## 2024-05-29 PROCEDURE — 84145 PROCALCITONIN (PCT): CPT

## 2024-05-29 PROCEDURE — 99285 EMERGENCY DEPT VISIT HI MDM: CPT

## 2024-05-29 PROCEDURE — 87088 URINE BACTERIA CULTURE: CPT

## 2024-05-29 PROCEDURE — 81001 URINALYSIS AUTO W/SCOPE: CPT

## 2024-05-29 PROCEDURE — 73502 X-RAY EXAM HIP UNI 2-3 VIEWS: CPT

## 2024-05-29 PROCEDURE — 70450 CT HEAD/BRAIN W/O DYE: CPT

## 2024-05-29 PROCEDURE — 85025 COMPLETE CBC W/AUTO DIFF WBC: CPT

## 2024-05-29 PROCEDURE — 84484 ASSAY OF TROPONIN QUANT: CPT

## 2024-05-29 PROCEDURE — 36415 COLL VENOUS BLD VENIPUNCTURE: CPT

## 2024-05-29 PROCEDURE — 93005 ELECTROCARDIOGRAM TRACING: CPT | Performed by: NURSE PRACTITIONER

## 2024-05-29 PROCEDURE — 80053 COMPREHEN METABOLIC PANEL: CPT

## 2024-05-29 RX ADMIN — WATER 1000 MG: 1 INJECTION INTRAMUSCULAR; INTRAVENOUS; SUBCUTANEOUS at 23:47

## 2024-05-29 ASSESSMENT — PAIN - FUNCTIONAL ASSESSMENT: PAIN_FUNCTIONAL_ASSESSMENT: 0-10

## 2024-05-29 ASSESSMENT — PAIN SCALES - GENERAL: PAINLEVEL_OUTOF10: 0

## 2024-05-30 PROBLEM — Z51.5 PALLIATIVE CARE BY SPECIALIST: Status: ACTIVE | Noted: 2024-05-30

## 2024-05-30 PROBLEM — R53.1 GENERALIZED WEAKNESS: Status: ACTIVE | Noted: 2024-05-30

## 2024-05-30 PROBLEM — G30.1 MODERATE LATE ONSET ALZHEIMER'S DEMENTIA WITHOUT BEHAVIORAL DISTURBANCE, PSYCHOTIC DISTURBANCE, MOOD DISTURBANCE, OR ANXIETY (HCC): Chronic | Status: ACTIVE | Noted: 2024-05-30

## 2024-05-30 PROBLEM — F02.B0 MODERATE LATE ONSET ALZHEIMER'S DEMENTIA WITHOUT BEHAVIORAL DISTURBANCE, PSYCHOTIC DISTURBANCE, MOOD DISTURBANCE, OR ANXIETY (HCC): Chronic | Status: ACTIVE | Noted: 2024-05-30

## 2024-05-30 PROBLEM — Z71.89 COUNSELING REGARDING ADVANCE CARE PLANNING AND GOALS OF CARE: Status: ACTIVE | Noted: 2024-05-30

## 2024-05-30 PROBLEM — E43 SEVERE PROTEIN-CALORIE MALNUTRITION (HCC): Status: ACTIVE | Noted: 2024-05-30

## 2024-05-30 PROBLEM — R63.0 DECREASED APPETITE: Status: ACTIVE | Noted: 2024-05-30

## 2024-05-30 PROBLEM — R53.83 OTHER FATIGUE: Status: ACTIVE | Noted: 2024-05-30

## 2024-05-30 PROBLEM — E43 SEVERE MALNUTRITION (HCC): Status: ACTIVE | Noted: 2024-05-30

## 2024-05-30 PROCEDURE — 6370000000 HC RX 637 (ALT 250 FOR IP): Performed by: FAMILY MEDICINE

## 2024-05-30 PROCEDURE — 99222 1ST HOSP IP/OBS MODERATE 55: CPT | Performed by: FAMILY MEDICINE

## 2024-05-30 PROCEDURE — 97165 OT EVAL LOW COMPLEX 30 MIN: CPT

## 2024-05-30 PROCEDURE — 6370000000 HC RX 637 (ALT 250 FOR IP): Performed by: HOSPITALIST

## 2024-05-30 PROCEDURE — 97116 GAIT TRAINING THERAPY: CPT

## 2024-05-30 PROCEDURE — 2060000000 HC ICU INTERMEDIATE R&B

## 2024-05-30 PROCEDURE — 97530 THERAPEUTIC ACTIVITIES: CPT

## 2024-05-30 PROCEDURE — 97161 PT EVAL LOW COMPLEX 20 MIN: CPT

## 2024-05-30 PROCEDURE — 2580000003 HC RX 258: Performed by: FAMILY MEDICINE

## 2024-05-30 PROCEDURE — 6360000002 HC RX W HCPCS: Performed by: FAMILY MEDICINE

## 2024-05-30 RX ORDER — HEPARIN SODIUM 5000 [USP'U]/ML
5000 INJECTION, SOLUTION INTRAVENOUS; SUBCUTANEOUS 2 TIMES DAILY
Status: DISCONTINUED | OUTPATIENT
Start: 2024-05-30 | End: 2024-06-03 | Stop reason: HOSPADM

## 2024-05-30 RX ORDER — LANOLIN ALCOHOL/MO/W.PET/CERES
3 CREAM (GRAM) TOPICAL NIGHTLY PRN
Status: DISCONTINUED | OUTPATIENT
Start: 2024-05-30 | End: 2024-06-03 | Stop reason: HOSPADM

## 2024-05-30 RX ORDER — ACETAMINOPHEN 325 MG/1
650 TABLET ORAL EVERY 6 HOURS PRN
Status: DISCONTINUED | OUTPATIENT
Start: 2024-05-30 | End: 2024-06-03 | Stop reason: HOSPADM

## 2024-05-30 RX ORDER — ONDANSETRON 4 MG/1
4 TABLET, ORALLY DISINTEGRATING ORAL EVERY 8 HOURS PRN
Status: DISCONTINUED | OUTPATIENT
Start: 2024-05-30 | End: 2024-06-03 | Stop reason: HOSPADM

## 2024-05-30 RX ORDER — ACETAMINOPHEN 650 MG/1
650 SUPPOSITORY RECTAL EVERY 6 HOURS PRN
Status: DISCONTINUED | OUTPATIENT
Start: 2024-05-30 | End: 2024-06-03 | Stop reason: HOSPADM

## 2024-05-30 RX ORDER — ASPIRIN 81 MG/1
81 TABLET ORAL DAILY
Status: DISCONTINUED | OUTPATIENT
Start: 2024-05-30 | End: 2024-06-03 | Stop reason: HOSPADM

## 2024-05-30 RX ORDER — SODIUM CHLORIDE 0.9 % (FLUSH) 0.9 %
5-40 SYRINGE (ML) INJECTION PRN
Status: DISCONTINUED | OUTPATIENT
Start: 2024-05-30 | End: 2024-06-03 | Stop reason: HOSPADM

## 2024-05-30 RX ORDER — ONDANSETRON 2 MG/ML
4 INJECTION INTRAMUSCULAR; INTRAVENOUS EVERY 6 HOURS PRN
Status: DISCONTINUED | OUTPATIENT
Start: 2024-05-30 | End: 2024-06-03 | Stop reason: HOSPADM

## 2024-05-30 RX ORDER — EZETIMIBE 10 MG/1
10 TABLET ORAL DAILY
Status: DISCONTINUED | OUTPATIENT
Start: 2024-05-30 | End: 2024-06-03 | Stop reason: HOSPADM

## 2024-05-30 RX ORDER — SODIUM CHLORIDE 9 MG/ML
INJECTION, SOLUTION INTRAVENOUS PRN
Status: DISCONTINUED | OUTPATIENT
Start: 2024-05-30 | End: 2024-06-03 | Stop reason: HOSPADM

## 2024-05-30 RX ORDER — SODIUM CHLORIDE 0.9 % (FLUSH) 0.9 %
5-40 SYRINGE (ML) INJECTION EVERY 12 HOURS SCHEDULED
Status: DISCONTINUED | OUTPATIENT
Start: 2024-05-30 | End: 2024-06-03 | Stop reason: HOSPADM

## 2024-05-30 RX ORDER — ROSUVASTATIN CALCIUM 10 MG/1
10 TABLET, COATED ORAL
Status: DISCONTINUED | OUTPATIENT
Start: 2024-05-30 | End: 2024-06-03 | Stop reason: HOSPADM

## 2024-05-30 RX ORDER — POLYETHYLENE GLYCOL 3350 17 G/17G
17 POWDER, FOR SOLUTION ORAL DAILY PRN
Status: DISCONTINUED | OUTPATIENT
Start: 2024-05-30 | End: 2024-06-03 | Stop reason: HOSPADM

## 2024-05-30 RX ORDER — VALSARTAN 160 MG/1
160 TABLET ORAL DAILY
Status: DISCONTINUED | OUTPATIENT
Start: 2024-05-30 | End: 2024-06-03 | Stop reason: HOSPADM

## 2024-05-30 RX ORDER — AMLODIPINE BESYLATE 5 MG/1
10 TABLET ORAL DAILY
Status: DISCONTINUED | OUTPATIENT
Start: 2024-05-30 | End: 2024-05-30

## 2024-05-30 RX ORDER — AMLODIPINE BESYLATE 5 MG/1
10 TABLET ORAL DAILY
Status: DISCONTINUED | OUTPATIENT
Start: 2024-05-30 | End: 2024-06-03 | Stop reason: HOSPADM

## 2024-05-30 RX ADMIN — ASPIRIN 81 MG: 81 TABLET, COATED ORAL at 08:48

## 2024-05-30 RX ADMIN — EZETIMIBE 10 MG: 10 TABLET ORAL at 08:48

## 2024-05-30 RX ADMIN — VALSARTAN 160 MG: 160 TABLET, FILM COATED ORAL at 12:36

## 2024-05-30 RX ADMIN — WATER 1000 MG: 1 INJECTION INTRAMUSCULAR; INTRAVENOUS; SUBCUTANEOUS at 21:47

## 2024-05-30 RX ADMIN — METOPROLOL TARTRATE 50 MG: 25 TABLET, FILM COATED ORAL at 08:48

## 2024-05-30 RX ADMIN — ROSUVASTATIN CALCIUM 10 MG: 10 TABLET, FILM COATED ORAL at 21:47

## 2024-05-30 RX ADMIN — SODIUM CHLORIDE, PRESERVATIVE FREE 10 ML: 5 INJECTION INTRAVENOUS at 08:50

## 2024-05-30 RX ADMIN — HEPARIN SODIUM 5000 UNITS: 5000 INJECTION INTRAVENOUS; SUBCUTANEOUS at 02:32

## 2024-05-30 RX ADMIN — ROSUVASTATIN CALCIUM 10 MG: 10 TABLET, FILM COATED ORAL at 02:32

## 2024-05-30 RX ADMIN — SODIUM CHLORIDE, PRESERVATIVE FREE 10 ML: 5 INJECTION INTRAVENOUS at 21:47

## 2024-05-30 RX ADMIN — HEPARIN SODIUM 5000 UNITS: 5000 INJECTION INTRAVENOUS; SUBCUTANEOUS at 21:47

## 2024-05-30 RX ADMIN — AMLODIPINE BESYLATE 10 MG: 5 TABLET ORAL at 05:46

## 2024-05-30 RX ADMIN — HEPARIN SODIUM 5000 UNITS: 5000 INJECTION INTRAVENOUS; SUBCUTANEOUS at 08:49

## 2024-05-30 RX ADMIN — METOPROLOL TARTRATE 50 MG: 25 TABLET, FILM COATED ORAL at 21:47

## 2024-05-30 ASSESSMENT — PAIN SCALES - GENERAL
PAINLEVEL_OUTOF10: 0

## 2024-05-30 NOTE — H&P
History and Physical    Date of Service:  5/29/2024  Primary Care Provider: Theodora Angela MD  Source of information: The patient and Chart review    Chief Complaint: Unable to Ambulate      History of Presenting Illness:   Kassandra Díaz is a 89 y.o. female PMH HTN, dementia, CAD with h/o CABG, Hyperlipids who presents from home with concerns for failure to thrive per ED physician.  Family is no longer bedside when seen. Patient has had an ongoing decline and now refusing to go out, was complaining today of left hip pain with inability to bear weight. There was also concern noted for decreased appetite and alertness noted per the chart. Patient herself when seen states she hadn't felt like eating for the past couple days but she cannot tell me how long. She is aware of her name, that she is at [facility] but isn't aware of year and thinks president is Hayden. She denies pain when seen and denies nausea and feeling like she has a fever.          REVIEW OF SYSTEMS:  Review of systems not obtained due to patient factors.     Past Medical History:   Diagnosis Date    CAD (coronary artery disease)     CAD (coronary artery disease) 01/15/2019    stents to LAD, LCX graft    Environmental allergies 2/2/2012    HTN (hypertension) 2/2/2012    Hypercholesteremia     Hyperlipidemia 2/2/2012    Hypertension     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      Past Surgical History:   Procedure Laterality Date    CAROTID STENT PLACEMENT N/A 2020    [place] UNLISTED PROCEDURE CARDIAC SURGERY      bypass     Prior to Admission medications    Medication Sig Start Date End Date Taking? Authorizing Provider   valsartan (DIOVAN) 160 MG tablet Take 1 tablet by mouth daily    Provider, MD Campbell   amLODIPine (NORVASC) 10 MG tablet Take 1 tablet by mouth daily    Automatic Reconciliation, Ar   aspirin 81 MG EC tablet Take 1 tablet by mouth daily    Automatic Reconciliation, Ar   cetirizine (ZYRTEC) 10 MG tablet Take 1 tablet by mouth daily as needed     MOBILITY ASSESSMENT: Recommend an assessment from physical therapy and/or occupational therapy  ANTICIPATED DISCHARGE: Greater than 48 hours.  ANTICIPATED DISPOSITION: SNF  EMERGENCY CONTACT/SURROGATE DECISION MAKER: daughter    CRITICAL CARE WAS PERFORMED FOR THIS ENCOUNTER: NO.      Signed By: Edgardo Lorenzo MD     May 29, 2024         Please note that this dictation may have been completed with Dragon, the computer voice recognition software.  Quite often unanticipated grammatical, syntax, homophones, and other interpretive errors are inadvertently transcribed by the computer software.  Please disregard these errors.  Please excuse any errors that have escaped final proofreading.

## 2024-05-30 NOTE — ED NOTES
ED TO INPATIENT SBAR HANDOFF    Patient Name: Kassandra Díaz   :  1931  89 y.o.   MRN:  [id number]  ED Room #:  ER10/10  Family/Caregiver Present no       Situation  Code Status: Prior     Allergies: Aspirin and Penicillins  Weight: Patient Vitals for the past 96 hrs (Last 3 readings):   Weight   24 44.5 kg (98 lb)     Arrived from: home  Chief Complaint:   Chief Complaint   Patient presents with    Unable to Ambulate     Hospital Problem/Diagnosis:  Principal Problem:    UTI (urinary tract infection)  Active Problems:    HTN (hypertension)    S/P CABG x 3    CAD (coronary artery disease)    Moderate late onset Alzheimer's dementia without behavioral disturbance, psychotic disturbance, mood disturbance, or anxiety (HCC)  Resolved Problems:    * No resolved hospital problems. *    Imaging:   XR HIP 2-3 VW W PELVIS LEFT   Final Result   No acute process.      CT HEAD WO CONTRAST   Final Result   1. No evidence of acute intracranial abnormality.   2. Mild chronic microvascular ischemic disease.              Abnormal labs:   Abnormal Labs Reviewed   COMPREHENSIVE METABOLIC PANEL - Abnormal; Notable for the following components:       Result Value    Potassium 3.3 (*)     Anion Gap 3 (*)     Glucose 115 (*)     Creatinine 1.26 (*)     Est, Glom Filt Rate 40 (*)     Calcium 12.0 (*)     Globulin 4.6 (*)     Albumin/Globulin Ratio 0.8 (*)     All other components within normal limits   URINALYSIS WITH REFLEX TO CULTURE - Abnormal; Notable for the following components:    Appearance CLOUDY (*)     Protein,  (*)     Blood, Urine SMALL (*)     Leukocyte Esterase, Urine SMALL (*)     BACTERIA, URINE 4+ (*)     Urine Culture if Indicated URINE CULTURE ORDERED (*)     All other components within normal limits     Abnormal Assessment Findings: urine    SAFETY    Mobility: limited transfer mobility ; new onset of lower ext weakness  ED Fall Risk: No data recorded   Restraints no   Sitter no      Background  History:   Past Medical History:   Diagnosis Date    CAD (coronary artery disease)     CAD (coronary artery disease) 01/15/2019    stents to LAD, LCX graft    Environmental allergies 2/2/2012    HTN (hypertension) 2/2/2012    Hypercholesteremia     Hyperlipidemia 2/2/2012    Hypertension     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       Assessment    Vitals/MEWS: MEWS Score: 2  Level of Consciousness: Alert (0)   Vitals:    05/29/24 2052 05/29/24 2330 05/29/24 2359   BP: (!) 221/105 (!) 134/100 (!) 151/104   Pulse: 61 62 61   Resp: 14     Temp: 98.7 °F (37.1 °C)  98.4 °F (36.9 °C)   TempSrc: Oral     SpO2: 98% 97% 96%   Weight: 44.5 kg (98 lb)     Height: 1.6 m (5' 3\")       DI:   Predictive Model Details          24 (Normal)  Factor Value    Calculated 5/30/2024 00:20 61% Age 89 years old    Deterioration Index Model 17% Respiratory rate 14     11% Systolic 151     4% Potassium abnormal (3.3 mmol/L)     3% Sodium 142 mmol/L     2% Pulse 61     2% WBC count 8.3 K/uL     0% Pulse oximetry 96 %     0% Hematocrit 37.2 %     0% Temperature 98.4 °F (36.9 °C)       FiO2 (%):   O2 Flow Rate: O2 Device: None (Room air)    Cardiac Rhythm:    Pain Scale: Pain Assessment  Pain Assessment: 0-10  Pain Level: 0  Patient's Stated Pain Goal: 0 - No pain  Last documented pain score (0-10 scale) Pain Level: 0  Last documented pain medication administered:      Mental Status: oriented  Orientation Level: Orientation Level: Oriented to person, Oriented to place  NIH Score:    C-SSRS: Risk of Suicide: No Risk  Bedside swallow:    Mystic Coma Scale (GCS): Gavi Coma Scale  Eye Opening: Spontaneous  Best Verbal Response: Oriented  Best Motor Response: Obeys commands  Mystic Coma Scale Score: 15  Active LDA's:   Peripheral IV 05/29/24 Left Antecubital (Active)   Site Assessment Clean, dry & intact 05/29/24 2105   Line Status Flushed;Specimen collected 05/29/24 2105   Line Care Connections checked and tightened 05/29/24 2105   Phlebitis

## 2024-05-30 NOTE — PROGRESS NOTES
Palliative Medicine  Patient Name: Kassandra Díaz  YOB: 1931  MRN: [id number]  Age: 89 y.o.  Gender: female    Date of Initial Consult: 5/30/2024  Date of Service: 5/30/2024  Time: 1:49 PM  Provider: Teena Angulo MD  Hospital Day: 2  Admit Date: 5/29/2024  Referring Provider: Dr. Clark      Reasons for Consultation:  Goals of Care    HISTORY OF PRESENT ILLNESS (HPI):   Kassandra Díaz is a 89 y.o. female with HTN, CAD who was admitted on 5/29/2024 from home with a diagnosis of UTI. She is being treated with IV antibiotics, and urine culture is pending. She had a CT head for concern of AMS which showed no acute process. Patient has also had X-ray of left him for c/o hip pain which showed no acute process.    Psychosocial: Patient lives with her daughter Eileen Díaz and grandalex San. Patient has two children, Eileen Díaz and Lizett San. They report that patient spends much of her time sitting up in her recliner and watching tv per her preference. She will get up to walk with a cane occasionally to go to the bathroom or get something to eat. Eileen and Bernard cook for her and state she has a good appetite at baseline. Up until relatively recently she has been able to take a shower on her own and dress herself. They state that over the last month she has had trouble remembering things but that otherwise she has been conversational and at her baseline mental status.    PALLIATIVE DIAGNOSES:    Encounter for Palliative Care  Goals of Care discussion  Advance Care Plan discussion  Fatigue  Decreased appetite  Physical Debility, Generalized weakness    ASSESSMENT AND PLAN:   Chart reviewed as summarized within this note.  I spoke with Dr. Hicks prior to visiting patient.  I visited patient this morning. Her daughter Eileen Díaz and grandson Bernard Díaz were present. Patient is sitting in bedside chair and very tired so is not able to participate in discussion at time of my visit.  Introduced myself and  Status: Full Code     Goals of Care:         Please refer to Palliative Medicine ACP notes for further details.    PALLIATIVE ASSESSMENT:      Palliative Performance Scale (PPS):  PPS: 40    ECOG:        Modified ESAS:  Modified-[place] Symptom Assessment Scale (ESAS)  Pain Score:  (patient resting)  Nausea Score:  (patient resting)  Dyspnea Score:  (patient resting)    Clinical Pain Assessment (nonverbal scale for severity on nonverbal patients):           NVPS:  Adult Nonverbal Pain Scale (NVPS)  Face: No particular expression or smile  Activity (Movement): Laid quietly, normal position  Guarding: Lying quietly, no positioning of hands over areas of bod    RDOS:  RDOS  Restlessness:non-purposeful: None  Accessory muscle use: rise in clavicle during inspiration: None  Look of fear: None  Total : 0      Vital Signs: Blood pressure (!) 154/73, pulse 60, temperature 98.4 °F (36.9 °C), resp. rate 14, height 1.6 m (5' 2.99\"), weight 44.5 kg (98 lb), SpO2 95 %.    PHYSICAL ASSESSMENT:   General: [] Oriented x3  [] Well appearing  [] Intubated  []Ill appearing  [x]Other: Sitting up in bedside chair with table and lunch tray in front of her. Appears tired and has eyes closed throughout most of visit. Intermittently opens eyes briefly and answers direct questions from family. Otherwise does not attempt to participate in conversation.  Mental Status: [] Normal mental status exam  [] Drowsy  [] Confused  []Other:  Cardiovascular: [] Regular rate/rhythm  [] Arrhythmia  [] Other:  Chest: [x] Effort normal  []Lungs clear  [] Respiratory distress  []Tachypnea  [] Other:  Abdomen: [] Soft/non-tender  [] Normal appearance  [] Distended  [] Ascites  [] Other:  Neurological: [x] Normal speech  [] Normal sensation  []Deficits present:  Extremity: [] Normal skin color/temp  [] Clubbing/cyanosis  [] No edema  [] Other:    Wt Readings from Last 15 Encounters:   05/29/24 44.5 kg (98 lb)   08/02/23 47.5 kg (104 lb 11.5 oz)   03/02/23 45.8

## 2024-05-30 NOTE — PROGRESS NOTES
Comprehensive Nutrition Assessment    Type and Reason for Visit: Positive Nutrition Screen (MST 3)    Nutrition Recommendations/Plan:   Adjusted diet to regular to promote PO intakes   Ensure 3x/day  Consider addition of appetite stimulant to assist w/ PO intakes; Consider addition of MVI  Consider addition of bowel regimen prn   Replete K+, continue to monitor/replete prn   Please document % intake of intakes in flowsheets       Malnutrition Assessment:  Malnutrition Status:  Severe malnutrition (05/30/24 1319)    Context:  Social/Environmental Circumstances     Findings of the 6 clinical characteristics of malnutrition:  Energy Intake:  Mild decrease in energy intake (Comment) (poor appetite/intakes x 2 wks per pt/family report)  Weight Loss:  Unable to assess (differring time-frame of wt loss per pt/family report and wt hx per EMR. Suspect wt loss however, RD unable to objectively determine with given data)     Body Fat Loss:  Severe body fat loss Orbital, Triceps, Buccal region   Muscle Mass Loss:  Severe muscle mass loss Thigh (quadraceps), Clavicles (pectoralis & deltoids), Temples (temporalis), Hand (interosseous)  Fluid Accumulation:  No significant fluid accumulation     Strength:  Not Performed       Nutrition Assessment:    Past Medical History:   Diagnosis Date    CAD (coronary artery disease)     CAD (coronary artery disease) 01/15/2019    stents to LAD, LCX graft    Environmental allergies 2/2/2012    HTN (hypertension) 2/2/2012    Hypercholesteremia     Hyperlipidemia 2/2/2012    Hypertension     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   Chart reviewed for low BMI. Family in room, reports that pt has been with a poor appetite/intake for ~2 wks. During this time, pt was consuming a small breakfast (egg white and sausage), with minimal intakes at L/D 2/2 decreased appetite. Family also reports pt's wt trending down from 105#

## 2024-05-30 NOTE — PLAN OF CARE
Problem: Safety - Adult  Goal: Free from fall injury  Outcome: Progressing  Flowsheets (Taken 5/30/2024 9959)  Free From Fall Injury: Instruct family/caregiver on patient safety

## 2024-05-30 NOTE — PLAN OF CARE
Problem: Occupational Therapy - Adult  Goal: By Discharge: Performs self-care activities at highest level of function for planned discharge setting.  See evaluation for individualized goals.  Description: FUNCTIONAL STATUS PRIOR TO ADMISSION:  Patient was ambulatory using a single point cane PRN.     HOME SUPPORT: Patient lived with family and didn't require require assistance with ADLs until ~1 month PTA. Family provides support for IADLs.     Occupational Therapy Goals:  Initiated 5/30/2024  1.  Patient will perform grooming at sink with Contact Guard Assist within 7 day(s).  2.  Patient will perform bathing with Supervision within 7 day(s).  3.  Patient will perform lower body dressing with Contact Guard Assist within 7 day(s).  4.  Patient will perform toilet transfers with Contact Guard Assist  within 7 day(s).  5.  Patient will perform all aspects of toileting with Contact Guard Assist within 7 day(s).  6.  Patient will participate in upper extremity therapeutic exercise/activities with Modified [place] for 5 minutes within 7 day(s).    Outcome: Progressing   OCCUPATIONAL THERAPY EVALUATION    Patient: Kassandra Daíz (89 y.o. female)  Date: 5/30/2024  Primary Diagnosis: Delirium [R41.0]  UTI (urinary tract infection) [N39.0]  Failure to thrive in adult [R62.7]  Urinary tract infection without hematuria, site unspecified [N39.0]       Precautions: Fall Risk                  ASSESSMENT :  The patient is limited by decreased functional mobility, independence in ADLs, strength, activity tolerance, endurance, safety awareness, cognition, coordination, balance, increased pain levels following admission for UTI and L hip pain. Pt cleared for therapy by nursing and received sitting upright in chair with family in room. Pt managed slippers with SBA. Completed sit>stand with Min A and RW. Pt with fair standing static balance with posterior lean. Pt completed functional mobility in room with cues for safety with RW  and demo'ing difficulty lifting L foot at times. Pt up in chair at end of session with all needs met.   Based on the impairments listed above pt would continue to benefit from skilled therapy in acute care setting. Currently recommend HHOT at discharge.    Functional Outcome Measure:  The patient scored 20/24on the [facility] outcome measure which is indicative of likely discharge home vs rehab.         PLAN :  Recommendations and Planned Interventions:   self care training, therapeutic activities, functional mobility training, balance training, therapeutic exercise, endurance activities, patient education, home safety training, and family training/education    Frequency/Duration: OT Plan of Care: 4 times/week    Recommendation for discharge: (in order for the patient to meet his/her long term goals): Therapy 2x a week in the home    Other factors to consider for discharge: high risk for falls    IF patient discharges home will need the following DME: rolling walker       SUBJECTIVE:   Patient stated, “thank you.”  OBJECTIVE DATA SUMMARY:     Past Medical History:   Diagnosis Date    CAD (coronary artery disease)     CAD (coronary artery disease) 01/15/2019    stents to LAD, LCX graft    Environmental allergies 2/2/2012    HTN (hypertension) 2/2/2012    Hypercholesteremia     Hyperlipidemia 2/2/2012    Hypertension     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     Past Surgical History:   Procedure Laterality Date    CAROTID STENT PLACEMENT N/A 2020    MT UNLISTED PROCEDURE CARDIAC SURGERY      bypass          Expanded or extensive additional review of patient history:   Social/Functional History  Lives With: Family  Type of Home: House  Home Layout: One level  Home Access: Stairs to enter with rails  Entrance Stairs - Number of Steps: 2  Bathroom Shower/Tub: Tub/Shower unit, Shower chair with back  Home Equipment: Cane  Receives Help From: Family    EXAMINATION OF PERFORMANCE DEFICITS:    Cognitive/Behavioral  result in activity limitations and/or participation restrictions LOW Complexity: No comorbidities that affect functional and  no verbal  or physical assist needed to complete eval tasks   Based on the above components, the patient evaluation is determined to be of the following complexity level: Low     88

## 2024-05-30 NOTE — ED NOTES
At bedside to assess. Pt being dressed by family after being removed from bedpan. Wipes noted in bedpan with urine; unable to use urine as a result. Pt and family made aware of need for urine sample. Xray at bedside

## 2024-05-30 NOTE — ED TRIAGE NOTES
Pt wheeled into triage rodriguez with granddaughter. Her granddaughter says that the pt has early signs of dementia and some confusion. Pt used to be able to stand up and walk to the bathroom, but now is unable to ambulate. Pt had a fall early this year. The granddaughter also has concerns about a UTI. Pt was recently admitted at [facility] for dehydration, as the granddaughter says the pt does not drink any water. Pt A&O x3 in triage, disoriented to time.    Pt has hx of MI and has 2 stents placed.     Letty NP assessing pt in triage.

## 2024-05-30 NOTE — PROGRESS NOTES
8:51 PM  Brought in by family (whom she lives with) because of declining health- specifically increasing confusion, inability to bear weight on LLE, decreased appetite, increased sleeping. Family states that pt has history of \"issues\" with LLE- had vein harvesting done years ago. No known fevers, nausea, vomiting, diarrhea. Pt incontinent at baseline- great grand daughter expresses concern for possible UTI.        I have evaluated the patient as the Provider in Rapid Medical Evaluation (RME). I have reviewed her vital signs and the triage nurse assessment. I have talked with the patient and any available family and advised that I am the provider in triage and have ordered the appropriate study to initiate their work up based on the clinical presentation during my assessment. I have advised that the patient will be accommodated in the Main ED as soon as possible. I have also requested to contact the triage nurse or myself immediately if the patient experiences any changes in their condition during this brief waiting period.  JOSE Stovall - NP

## 2024-05-30 NOTE — CARE COORDINATION
05/30/24 3881   Condition of Participation: Discharge Planning   The Plan for Transition of Care is related to the following treatment goals: Home Health   The Patient and/or Patient Representative was provided with a Choice of Provider? Patient Representative   Name of the Patient Representative who was provided with the Choice of Provider and agrees with the Discharge Plan?  Eileen Díaz [phone]   The Patient and/Or Patient Representative agree with the Discharge Plan? Yes   Freedom of Choice list was provided with basic dialogue that supports the patient's individualized plan of care/goals, treatment preferences, and shares the quality data associated with the providers?  Yes

## 2024-05-30 NOTE — PROGRESS NOTES
Hospitalist Progress Note  Rebecca Hicks MD  Answering service: [phone]        Date of Service:  2024  NAME:  Kassandra Díaz  :  1931  MRN:  [id number]      Admission Summary:   Kassandra Díaz is a 89 y.o. female PMH HTN, dementia, CAD with h/o CABG, Hyperlipids who presents from home with concerns for failure to thrive per ED physician.  Family is no longer bedside when seen. Patient has had an ongoing decline and now refusing to go out, was complaining today of left hip pain with inability to bear weight. There was also concern noted for decreased appetite and alertness noted per the chart. Patient herself when seen states she hadn't felt like eating for the past couple days but she cannot tell me how long. She is aware of her name, that she is at [facility] but isn't aware of year and thinks president is Hayden. She denies pain when seen and denies nausea and feeling like she has a fever.          Interval history / Subjective:   More alert compared to yesterday  Oob working with PT today      Assessment & Plan:      generalized weakness   AMS/acute metabolic   Alz dementia  -mental status worse by uti likely  Iv abx,  PT eval  Saft watch        UTI- continue rocephin pending urine culture, tailor abx when cx available    HTN- continue norvasc and metoprolol, hold ARB follow up renal fxn and ensure stable, may need restart if BP remains high    CAD- continue statin, ASA    Hip pain- plain films without acute process- PT pending, if continues to have difficulty ambulating, to obtain CT of hip, continue ambulation as able, care coordination input for possible placement and home health needs    Hypokalemia  Replete        Code status: full   Prophylaxis: sc lovenox   Care Plan discussed with: pt, daughter, granddaughter, CM.   Anticipated Disposition: likely HH   Central Line:   none              Review of Systems:

## 2024-05-30 NOTE — ED PROVIDER NOTES
PRESENT ILLNESS   (Location/Symptom, Timing/Onset, Context/Setting, Quality, Duration, Modifying Factors, Severity)  Note limiting factors.   See MDM    Nursing Notes were reviewed.    REVIEW OF SYSTEMS    (2-9 systems for level 4, 10 or more for level 5)   See MDM    PAST MEDICAL HISTORY     Past Medical History:   Diagnosis Date    CAD (coronary artery disease)     CAD (coronary artery disease) 01/15/2019    stents to LAD, LCX graft    Environmental allergies 2012    HTN (hypertension) 2012    Hypercholesteremia     Hyperlipidemia 2012    Hypertension     S/P CABG x 3 2012       SURGICAL HISTORY       Past Surgical History:   Procedure Laterality Date    CAROTID STENT PLACEMENT N/A     MT UNLISTED PROCEDURE CARDIAC SURGERY      bypass       CURRENT MEDICATIONS       Previous Medications    AMLODIPINE (NORVASC) 10 MG TABLET    Take 1 tablet by mouth daily    ASPIRIN 81 MG EC TABLET    Take 1 tablet by mouth daily    CETIRIZINE (ZYRTEC) 10 MG TABLET    Take 1 tablet by mouth daily as needed    EZETIMIBE (ZETIA) 10 MG TABLET    Take 1 tablet by mouth daily    METOPROLOL TARTRATE (LOPRESSOR) 50 MG TABLET    Take 1 tablet by mouth 2 times daily    ROSUVASTATIN (CRESTOR) 20 MG TABLET    Take 1 tablet by mouth nightly    THIAMINE 100 MG TABLET    Take 1 tablet by mouth daily    VALSARTAN (DIOVAN) 160 MG TABLET    Take 1 tablet by mouth daily       ALLERGIES     Aspirin and Penicillins    FAMILY HISTORY       Family History   Problem Relation Age of Onset    Diabetes Father     Stroke Father     Cancer Brother 59        esophageal cancer    Heart Disease Mother     Hypertension Mother           SOCIAL HISTORY       Social History     Socioeconomic History    Marital status:    Tobacco Use    Smoking status: Former     Current packs/day: 0.00     Types: Cigarettes     Quit date: 1975     Years since quittin.4    Smokeless tobacco: Never   Substance and Sexual Activity    Alcohol use: No  to thrive in adult          DISPOSITION/PLAN   DISPOSITION        PATIENT REFERRED TO:  No follow-up provider specified.    DISCHARGE MEDICATIONS:  New Prescriptions    No medications on file         (Please note that portions of this note were completed with a voice recognition program.  Efforts were made to edit the dictations but occasionally words are mis-transcribed.)    Cassidy Clark DO (electronically signed)  Emergency Attending Physician / Physician Assistant / Nurse Practitioner             Cassidy Clark DO  05/29/24 4220

## 2024-05-30 NOTE — ED NOTES
Olivia, 2N charge aware of etransfer per primary RN, Corky.  Will send to assigned room in 20  minutes.

## 2024-05-30 NOTE — CARE COORDINATION
Care Management Initial Assessment       RUR: 11% Low  Readmission? No  1st IM letter given? Yes - 05/30  1st  letter given: No n/a         05/30/24 1244   Service Assessment   Patient Orientation Alert and Oriented;Person;Self;Place   Cognition Other (see comment)  (Moments of alertness - primarily sleepy with eyes closing sitting in chair)   History Provided By Child/Family;Patient   Primary Caregiver Family   Accompanied By/Relationship Brandon Díaz [phone]   Support Systems Children;Family Members   Patient's Healthcare Decision Maker is: Legal Next of Kin  (Mahsa Phoenix and Eileen)   PCP Verified by CM No  ( on facesheet is not correct. Dgt Eileen to call this CM with updated PCP.)   Prior Functional Level Independent in ADLs/IADLs;Assistance with the following:;Housework;Cooking;Shopping   Current Functional Level Assistance with the following:;Dressing;Toileting;Cooking;Housework;Shopping;Mobility   Can patient return to prior living arrangement Yes   Ability to make needs known: Fair   Family able to assist with home care needs: Yes   Would you like for me to discuss the discharge plan with any other family members/significant others, and if so, who? Yes  (Mahsa Phoenix or Eileen)   Financial Resources Medicare   CM/SW Referral DME   Social/Functional History   Lives With Daughter;Other (comment)  (Grandson)   Home Layout One level   Home Access Stairs to enter with rails   Entrance Stairs - Number of Steps 2   Entrance Stairs - Rails Both   Bathroom Shower/Tub Walk-in shower   Bathroom Toilet Standard   Bathroom Equipment Grab bars in shower;Shower chair   Home Equipment Cane   Receives Help From Family   Discharge Planning   Potential Assistance Needed Durable Medical Equipment   Potential DME Needed Bedside Commode;Other (Comment)  (Rollator)   DME Ordered? Bedside commode;Other (comment)  (Rollator)   Patient expects to be discharged to: House     CM met with pt and her  dgt Eileen Díaz who was at bedside. Pt's two daughters are LNOK and primary contacts. Dgt transports pt to appointments.   Lizett San [phone]  Eileen Díaz [phone]  Pt resides in her one story home with her dgt Eileen and an adult grandson. 2 DEBRA with rails. DME - Pt has a walking cane and a shower chair. Consult received for rw and bsc. Pt and dgt are requesting a rollator. CM to reach out to therapy/attending to communicate pt/family request.     Pharmacy: [facility] transport: Family   HH/SNF/IPR - no hx   PCP - dgt to call CM and provide name  Facesheet demographics verified. [employer] Medicare is only medical insurance coverage.     Attending  order for rollator and BSC received. Both ordered in [place] from [employer].     HH is rec. AT request of pt's daughter Eileen, referrals sent in [facility] to [facility], [facility], [facility]. Pt's family requesting [facility] to home with [facility] or [facility] program with [facility].     Unit CM will follow for rec and ALEJANDRA planning and assistance.   ERICA Jeffries    or by Perfect Serve

## 2024-05-30 NOTE — PLAN OF CARE
Problem: Physical Therapy - Adult  Goal: By Discharge: Performs mobility at highest level of function for planned discharge setting.  See evaluation for individualized goals.  Description: FUNCTIONAL STATUS PRIOR TO ADMISSION: Patient was modified independent using a single point cane for functional mobility.    HOME SUPPORT PRIOR TO ADMISSION: The patient lived with family and has had to have increased assist over the last weeks  .    Physical Therapy Goals  Initiated 5/30/2024  1.  Patient will move from supine to sit and sit to supine and roll side to side in bed with supervision/set-up within 7 day(s).    2.  Patient will perform sit to stand with supervision/set-up within 7 day(s).  3.  Patient will transfer from bed to chair and chair to bed with supervision/set-up using the least restrictive device within 7 day(s).  4.  Patient will ambulate with supervision/set-up for 50 feet with the least restrictive device within 7 day(s).   5.  Patient will ascend/descend 2 stairs with 1 handrail(s) with contact guard assist within 7 day(s).    Outcome: Progressing   PHYSICAL THERAPY EVALUATION    Patient: Kassandra Díaz (89 y.o. female)  Date: 5/30/2024  Primary Diagnosis: Delirium [R41.0]  UTI (urinary tract infection) [N39.0]  Failure to thrive in adult [R62.7]  Urinary tract infection without hematuria, site unspecified [N39.0]       Precautions: Restrictions/Precautions: Fall Risk                      ASSESSMENT :   DEFICITS/IMPAIRMENTS:   The patient is limited by decreased functional mobility, strength, activity tolerance, safety awareness, cognition, balance     Based on the impairments listed above patient is below baseline of modified independent but did not improvement with use of rolling walker.  She tolerated assessment well and appropriate responses throughout.  Spoke with family post eval and daughter expresses desire for patient to return home and in agreement with getting a rolling walker to assist with  PMCID: [id number].  4. Mackenzie ZELAYA, Nazia S, Barbara W, Jonelle BARKLEY. AM-PAC Short Forms Manual 4.0. Revised 2/2020.                                                                                                                                                                                                                            Activity Tolerance:   Fair     After treatment:   Patient left in no apparent distress sitting up in chair, Call bell within reach, and Bed/ chair alarm activated    COMMUNICATION/EDUCATION:   The patient's plan of care was discussed with: registered nurse and     Patient Education  Education Given To: Patient;Family  Education Provided: Role of Therapy;Plan of Care;Family Education  Education Method: Verbal  Barriers to Learning: None  Education Outcome: Continued education needed;Verbalized understanding    Thank you for this referral.  TARIQ DOBBINS, PT  Minutes: 35      Physical Therapy Evaluation Charge Determination   History Examination Presentation Decision-Making   MEDIUM  Complexity : 1-2 comorbidities / personal factors will impact the outcome/ POC  LOW Complexity : 1-2 Standardized tests and measures addressing body structure, function, activity limitation and / or participation in recreation  LOW Complexity : Stable, uncomplicated  AM-PAC  MEDIUM   Based on the above components, the patient evaluation is determined to be of the following complexity level: Low

## 2024-05-31 LAB
ALBUMIN SERPL-MCNC: 2.8 G/DL (ref 3.5–5)
ALBUMIN/GLOB SERPL: 0.8 (ref 1.1–2.2)
ALP SERPL-CCNC: 78 U/L (ref 45–117)
ALT SERPL-CCNC: 20 U/L (ref 12–78)
ANION GAP SERPL CALC-SCNC: 4 MMOL/L (ref 5–15)
AST SERPL-CCNC: 22 U/L (ref 15–37)
BACTERIA SPEC CULT: ABNORMAL
BASOPHILS # BLD: 0 K/UL (ref 0–0.1)
BASOPHILS NFR BLD: 0 % (ref 0–1)
BILIRUB SERPL-MCNC: 0.3 MG/DL (ref 0.2–1)
BUN SERPL-MCNC: 19 MG/DL (ref 6–20)
BUN/CREAT SERPL: 17 (ref 12–20)
CALCIUM SERPL-MCNC: 11.1 MG/DL (ref 8.5–10.1)
CC UR VC: ABNORMAL
CHLORIDE SERPL-SCNC: 109 MMOL/L (ref 97–108)
CO2 SERPL-SCNC: 30 MMOL/L (ref 21–32)
CREAT SERPL-MCNC: 1.12 MG/DL (ref 0.55–1.02)
DIFFERENTIAL METHOD BLD: ABNORMAL
EOSINOPHIL # BLD: 0.1 K/UL (ref 0–0.4)
EOSINOPHIL NFR BLD: 1 % (ref 0–7)
ERYTHROCYTE [DISTWIDTH] IN BLOOD BY AUTOMATED COUNT: 11.9 % (ref 11.5–14.5)
GLOBULIN SER CALC-MCNC: 3.6 G/DL (ref 2–4)
GLUCOSE SERPL-MCNC: 107 MG/DL (ref 65–100)
HCT VFR BLD AUTO: 33.8 % (ref 35–47)
HGB BLD-MCNC: 11.3 G/DL (ref 11.5–16)
IMM GRANULOCYTES # BLD AUTO: 0 K/UL (ref 0–0.04)
IMM GRANULOCYTES NFR BLD AUTO: 0 % (ref 0–0.5)
LYMPHOCYTES # BLD: 1.7 K/UL (ref 0.8–3.5)
LYMPHOCYTES NFR BLD: 24 % (ref 12–49)
MAGNESIUM SERPL-MCNC: 2.2 MG/DL (ref 1.6–2.4)
MCH RBC QN AUTO: 29.8 PG (ref 26–34)
MCHC RBC AUTO-ENTMCNC: 33.4 G/DL (ref 30–36.5)
MCV RBC AUTO: 89.2 FL (ref 80–99)
MONOCYTES # BLD: 0.5 K/UL (ref 0–1)
MONOCYTES NFR BLD: 8 % (ref 5–13)
NEUTS SEG # BLD: 4.5 K/UL (ref 1.8–8)
NEUTS SEG NFR BLD: 67 % (ref 32–75)
NRBC # BLD: 0 K/UL (ref 0–0.01)
NRBC BLD-RTO: 0 PER 100 WBC
PLATELET # BLD AUTO: 173 K/UL (ref 150–400)
PMV BLD AUTO: 9.9 FL (ref 8.9–12.9)
POTASSIUM SERPL-SCNC: 3 MMOL/L (ref 3.5–5.1)
PROT SERPL-MCNC: 6.4 G/DL (ref 6.4–8.2)
RBC # BLD AUTO: 3.79 M/UL (ref 3.8–5.2)
SERVICE CMNT-IMP: ABNORMAL
SODIUM SERPL-SCNC: 143 MMOL/L (ref 136–145)
WBC # BLD AUTO: 6.8 K/UL (ref 3.6–11)

## 2024-05-31 PROCEDURE — 36415 COLL VENOUS BLD VENIPUNCTURE: CPT

## 2024-05-31 PROCEDURE — 97535 SELF CARE MNGMENT TRAINING: CPT

## 2024-05-31 PROCEDURE — 97116 GAIT TRAINING THERAPY: CPT

## 2024-05-31 PROCEDURE — 6370000000 HC RX 637 (ALT 250 FOR IP): Performed by: FAMILY MEDICINE

## 2024-05-31 PROCEDURE — 6370000000 HC RX 637 (ALT 250 FOR IP): Performed by: HOSPITALIST

## 2024-05-31 PROCEDURE — 97110 THERAPEUTIC EXERCISES: CPT

## 2024-05-31 PROCEDURE — 1100000000 HC RM PRIVATE

## 2024-05-31 PROCEDURE — 99233 SBSQ HOSP IP/OBS HIGH 50: CPT | Performed by: FAMILY MEDICINE

## 2024-05-31 PROCEDURE — 83735 ASSAY OF MAGNESIUM: CPT

## 2024-05-31 PROCEDURE — 2580000003 HC RX 258: Performed by: FAMILY MEDICINE

## 2024-05-31 PROCEDURE — 6360000002 HC RX W HCPCS: Performed by: FAMILY MEDICINE

## 2024-05-31 PROCEDURE — 85025 COMPLETE CBC W/AUTO DIFF WBC: CPT

## 2024-05-31 PROCEDURE — 80053 COMPREHEN METABOLIC PANEL: CPT

## 2024-05-31 RX ADMIN — WATER 1000 MG: 1 INJECTION INTRAMUSCULAR; INTRAVENOUS; SUBCUTANEOUS at 22:14

## 2024-05-31 RX ADMIN — SODIUM CHLORIDE, PRESERVATIVE FREE 10 ML: 5 INJECTION INTRAVENOUS at 21:09

## 2024-05-31 RX ADMIN — METOPROLOL TARTRATE 50 MG: 25 TABLET, FILM COATED ORAL at 09:24

## 2024-05-31 RX ADMIN — EZETIMIBE 10 MG: 10 TABLET ORAL at 09:24

## 2024-05-31 RX ADMIN — VALSARTAN 160 MG: 160 TABLET, FILM COATED ORAL at 09:24

## 2024-05-31 RX ADMIN — ASPIRIN 81 MG: 81 TABLET, COATED ORAL at 09:24

## 2024-05-31 RX ADMIN — POTASSIUM BICARBONATE 40 MEQ: 782 TABLET, EFFERVESCENT ORAL at 09:24

## 2024-05-31 RX ADMIN — AMLODIPINE BESYLATE 10 MG: 5 TABLET ORAL at 09:24

## 2024-05-31 RX ADMIN — HEPARIN SODIUM 5000 UNITS: 5000 INJECTION INTRAVENOUS; SUBCUTANEOUS at 09:24

## 2024-05-31 RX ADMIN — SODIUM CHLORIDE, PRESERVATIVE FREE 10 ML: 5 INJECTION INTRAVENOUS at 09:24

## 2024-05-31 RX ADMIN — ROSUVASTATIN CALCIUM 10 MG: 10 TABLET, FILM COATED ORAL at 21:09

## 2024-05-31 RX ADMIN — HEPARIN SODIUM 5000 UNITS: 5000 INJECTION INTRAVENOUS; SUBCUTANEOUS at 21:09

## 2024-05-31 ASSESSMENT — PAIN SCALES - GENERAL
PAINLEVEL_OUTOF10: 0

## 2024-05-31 NOTE — PROGRESS NOTES
Palliative Medicine  Patient Name: Kassandra Díaz  YOB: 1931  MRN: [id number]  Age: 89 y.o.  Gender: female    Date of Initial Consult: 5/30/2024  Date of Service: 5/31/2024  Time: 1:23 PM  Provider: Teena Angulo MD  Hospital Day: 3  Admit Date: 5/29/2024  Referring Provider: Dr. Clark      Reasons for Consultation:  Goals of Care    HISTORY OF PRESENT ILLNESS (HPI):   Kassandra Díaz is a 89 y.o. female with HTN, CAD who was admitted on 5/29/2024 from home with a diagnosis of UTI. She is being treated with IV antibiotics, and urine culture is pending. She had a CT head for concern of AMS which showed no acute process. Patient has also had X-ray of left him for c/o hip pain which showed no acute process.    Psychosocial: Patient lives with her daughter Eileen Díaz and grandson Bernard San. Patient has two children, Eileen Díaz and Lizett San. They report that patient spends much of her time sitting up in her recliner and watching tv per her preference. She will get up to walk with a cane occasionally to go to the bathroom or get something to eat. Eileen and Bernard cook for her and state she has a good appetite at baseline. Up until relatively recently she has been able to take a shower on her own and dress herself. They state that over the last month she has had trouble remembering things but that otherwise she has been conversational and at her baseline mental status.    PALLIATIVE DIAGNOSES:    Encounter for Palliative Care  Goals of Care discussion  Advance Care Plan discussion  Fatigue--improved  Decreased appetite--improved  Physical Debility, Generalized weakness--PT/OT recommends HH    ASSESSMENT AND PLAN:   I followed up with patient this morning after initial Palliative consult completed yesterday..  Patient is awake, alert, sitting up in hospital bed and is conversational. States she is feeling more like herself today. She didn't really like breakfast but is feeling more hungry

## 2024-05-31 NOTE — PROGRESS NOTES
Hospitalist Progress Note  Rebecca Hicks MD  Answering service: [phone]        Date of Service:  2024  NAME:  Kassandra Díaz  :  1931  MRN:  [id number]      Admission Summary:   Kassandra Díaz is a 89 y.o. female PMH HTN, dementia, CAD with h/o CABG, Hyperlipids who presents from home with concerns for failure to thrive per ED physician.  Family is no longer bedside when seen. Patient has had an ongoing decline and now refusing to go out, was complaining today of left hip pain with inability to bear weight. There was also concern noted for decreased appetite and alertness noted per the chart. Patient herself when seen states she hadn't felt like eating for the past couple days but she cannot tell me how long. She is aware of her name, that she is at [facility] but isn't aware of year and thinks president is Hayden. She denies pain when seen and denies nausea and feeling like she has a fever.          Interval history / Subjective:   Awake, mental status baseline   Oob working with PT t     Assessment & Plan:      generalized weakness   AMS/acute metabolic   Alz dementia  -mental status worse by uti likely  Iv abx,  PT eval  Safety watch        UTI- continue rocephin pending urine culture, tailor abx when cx available  Urine culture preliminary GNR      HTN- continue norvasc and metoprolol, hold ARB follow up renal fxn and ensure stable, may need restart if BP remains high    CAD- continue statin, ASA    Hip pain- plain films without acute process- PT pending, if continues to have difficulty ambulating, to obtain CT of hip, continue ambulation as able, care coordination input for possible placement and home health needs    Hypokalemia  Replete        Code status: full   Prophylaxis: sc lovenox   Care Plan discussed with: pt, daughter, granddaughter, CM.   Anticipated Disposition: likely HH in 1-2 days with urine  culture final   Central Line:   none              Review of Systems:   Pertinent items are noted in HPI.         Vital Signs:    Last 24hrs VS reviewed since prior progress note. Most recent are:  Vitals:    05/31/24 1357   BP:    Pulse: 55   Resp:    Temp:    SpO2:          Intake/Output Summary (Last 24 hours) at 5/31/2024 1421  Last data filed at 5/31/2024 0527  Gross per 24 hour   Intake --   Output 600 ml   Net -600 ml        Physical Examination:     I had a face to face encounter with this patient and independently examined them on 5/31/2024 as outlined below:          General : awake, o to name only , no acute distress,   HEENT: PEERL, EOMI, moist mucus membrane, TM clear  Neck: supple, no JVD, no meningeal signs  Chest: Clear to auscultation bilaterally   CVS: S1 S2 heard, Capillary refill less than 2 seconds  Abd: soft/ non tender, non distended, BS physiological,   Ext: no clubbing, no cyanosis, no edema, brisk 2+ DP pulses  Neuro/Psych: pleasant mood and affect, no focal weakness   Skin: warm            Data Review:    Review and/or order of clinical lab test    I have independently reviewed and interpreted patient's lab and all other diagnostic data    Notes reviewed from all clinical/nonclinical/nursing services involved in patient's clinical care. Care coordination discussions were held with appropriate clinical/nonclinical/ nursing providers based on care coordination needs.     Labs:     Recent Labs     05/29/24 2103 05/31/24  0554   WBC 8.3 6.8   HGB 12.6 11.3*   HCT 37.2 33.8*    173       Recent Labs     05/29/24 2103 05/31/24  0554    143   K 3.3* 3.0*    109*   CO2 32 30   BUN 15 19   MG  --  2.2       Recent Labs     05/29/24 2103 05/31/24  0554   ALT 29 20   GLOB 4.6* 3.6       No results for input(s): \"INR\", \"APTT\" in the last 72 hours.    Invalid input(s): \"PTP\"   No results for input(s): \"TIBC\" in the last 72 hours.    Invalid input(s): \"FE\", \"PSAT\", \"FERR\"   No  results found for: \"RBCF\"   No results for input(s): \"PH\", \"PCO2\", \"PO2\" in the last 72 hours.  No results for input(s): \"CPK\" in the last 72 hours.    Invalid input(s): \"CPKMB\", \"CKNDX\", \"TROIQ\"  Lab Results   Component Value Date/Time    CHOL 145 09/24/2019 02:36 PM    HDL 66 09/24/2019 02:36 PM    LDL 66 09/24/2019 02:36 PM     No results found for: \"GLUCPOC\"  [unfilled]      Medications Reviewed:     Current Facility-Administered Medications   Medication Dose Route Frequency    aspirin EC tablet 81 mg  81 mg Oral Daily    ezetimibe (ZETIA) tablet 10 mg  10 mg Oral Daily    metoprolol tartrate (LOPRESSOR) tablet 50 mg  50 mg Oral BID    rosuvastatin (CRESTOR) tablet 10 mg  10 mg Oral QHS    sodium chloride flush 0.9 % injection 5-40 mL  5-40 mL IntraVENous 2 times per day    sodium chloride flush 0.9 % injection 5-40 mL  5-40 mL IntraVENous PRN    0.9 % sodium chloride infusion   IntraVENous PRN    heparin (porcine) injection 5,000 Units  5,000 Units SubCUTAneous BID    ondansetron (ZOFRAN-ODT) disintegrating tablet 4 mg  4 mg Oral Q8H PRN    Or    ondansetron (ZOFRAN) injection 4 mg  4 mg IntraVENous Q6H PRN    polyethylene glycol (GLYCOLAX) packet 17 g  17 g Oral Daily PRN    acetaminophen (TYLENOL) tablet 650 mg  650 mg Oral Q6H PRN    Or    acetaminophen (TYLENOL) suppository 650 mg  650 mg Rectal Q6H PRN    melatonin tablet 3 mg  3 mg Oral Nightly PRN    amLODIPine (NORVASC) tablet 10 mg  10 mg Oral Daily    valsartan (DIOVAN) tablet 160 mg  160 mg Oral Daily    cefTRIAXone (ROCEPHIN) 1,000 mg in sterile water 10 mL IV syringe  1,000 mg IntraVENous Q24H     ______________________________________________________________________  EXPECTED LENGTH OF STAY: 3  ACTUAL LENGTH OF STAY:          2                 Rebecca Hicks MD     negative...

## 2024-05-31 NOTE — PLAN OF CARE
Problem: Occupational Therapy - Adult  Goal: By Discharge: Performs self-care activities at highest level of function for planned discharge setting.  See evaluation for individualized goals.  Description: FUNCTIONAL STATUS PRIOR TO ADMISSION:  Patient was ambulatory using a single point cane PRN.     HOME SUPPORT: Patient lived with family and didn't require require assistance with ADLs until ~1 month PTA. Family provides support for IADLs.     Occupational Therapy Goals:  Initiated 5/30/2024  1.  Patient will perform grooming at sink with Contact Guard Assist within 7 day(s).  2.  Patient will perform bathing with Supervision within 7 day(s).  3.  Patient will perform lower body dressing with Contact Guard Assist within 7 day(s).  4.  Patient will perform toilet transfers with Contact Guard Assist  within 7 day(s).  5.  Patient will perform all aspects of toileting with Contact Guard Assist within 7 day(s).  6.  Patient will participate in upper extremity therapeutic exercise/activities with Modified [place] for 5 minutes within 7 day(s).    Outcome: Progressing   OCCUPATIONAL THERAPY TREATMENT  Patient: Kassandra Díaz (89 y.o. female)  Date: 5/31/2024  Primary Diagnosis: Delirium [R41.0]  UTI (urinary tract infection) [N39.0]  Failure to thrive in adult [R62.7]  Urinary tract infection without hematuria, site unspecified [N39.0]       Precautions: Fall Risk                Chart, occupational therapy assessment, plan of care, and goals were reviewed.      ASSESSMENT  Patient continues to benefit from skilled OT services and is progressing towards goals. Pt received seated in bedside chair, finishing lunch with daughter and grandson present. Pt continues to be limited by deficits in dynamic balance during functional mobility, safety awareness, activity tolerance, and memory loss. Pt completed sit<>stand and functional mobility to bathroom with RW with CGA. Pt required multimodal cueing to safely use RW while  memory;Decreased long term memory  Safety Judgement: Decreased awareness of need for assistance;Decreased awareness of need for safety  Problem Solving: Assistance required to generate solutions;Assistance required to implement solutions  Insights: Decreased awareness of deficits  Initiation: Requires cues for some  Sequencing: Appears intact    Functional Mobility and Transfers for ADLs:  Bed Mobility:  Bed Mobility Training  Bed Mobility Training: Yes  Overall Level of Assistance: Contact-guard assistance  Interventions: Safety awareness training;Tactile cues;Verbal cues  Supine to Sit: Supervision  Sit to Supine: Moderate assistance  Scooting: Stand-by assistance     Transfers:  Transfer Training  Overall Level of Assistance: Contact-guard assistance  Sit to Stand: Contact-guard assistance  Stand to Sit: Contact-guard assistance      Balance:     Balance  Sitting: Intact  Standing: Impaired;With support  Standing - Static: Fair;Constant support  Standing - Dynamic: Constant support;Poor      ADL Intervention:  Feeding: .  - Food to Mouth: Set-up Assistance  - Drink to Mouth : Set-up Assistance    Grooming: .  - Face Washing : Minimal Assistance, Standing at sink    Pain Rating:  No pain mentioned.    Activity Tolerance:   Fair  and requires rest breaks  Please refer to the flowsheet for vital signs taken during this treatment.    After treatment:   Patient left in no apparent distress in bed, Call bell within reach, Bed/ chair alarm activated, Caregiver / family present, and Side rails x3    COMMUNICATION/EDUCATION:   The patient's plan of care was discussed with: physical therapist and registered nurse    Patient Education  Education Given To: Patient;Family  Education Provided: Fall Prevention Strategies;Energy Conservation;Mobility Training;Transfer Training;Equipment  Education Method: Verbal;Demonstration  Barriers to Learning: Cognition  Education Outcome: Demonstrated understanding;Verbalized

## 2024-05-31 NOTE — PLAN OF CARE
Problem: Physical Therapy - Adult  Goal: By Discharge: Performs mobility at highest level of function for planned discharge setting.  See evaluation for individualized goals.  Description: FUNCTIONAL STATUS PRIOR TO ADMISSION: Patient was modified independent using a single point cane for functional mobility.    HOME SUPPORT PRIOR TO ADMISSION: The patient lived with family and has had to have increased assist over the last weeks  .    Physical Therapy Goals  Initiated 5/30/2024  1.  Patient will move from supine to sit and sit to supine and roll side to side in bed with supervision/set-up within 7 day(s).    2.  Patient will perform sit to stand with supervision/set-up within 7 day(s).  3.  Patient will transfer from bed to chair and chair to bed with supervision/set-up using the least restrictive device within 7 day(s).  4.  Patient will ambulate with supervision/set-up for 50 feet with the least restrictive device within 7 day(s).   5.  Patient will ascend/descend 2 stairs with 1 handrail(s) with contact guard assist within 7 day(s).    Outcome: Progressing   PHYSICAL THERAPY TREATMENT    Patient: Kassandra Díaz (89 y.o. female)  Date: 5/31/2024  Diagnosis: Delirium [R41.0]  UTI (urinary tract infection) [N39.0]  Failure to thrive in adult [R62.7]  Urinary tract infection without hematuria, site unspecified [N39.0] UTI (urinary tract infection)      Precautions: Fall Risk                      ASSESSMENT:  Patient continues to benefit from skilled PT services and is progressing towards goals. Patient received in bed and daughter present.  Managing bed mobility well today ; continues to require contact guard to min assist with ambulation.  Continues with limited LLE mobility although no complaint of pain.  Lack of heel strike on L ; responds to verbal cues for increased step length and clearing of floor.  Discussed at length with daughter about equipment needs and explained why a rollator would not be good option  asymmetrical  Gait Abnormalities: Decreased step clearance      Activity Tolerance:   Fair     After treatment:   Patient left in no apparent distress sitting up in chair, Call bell within reach, and Bed/ chair alarm activated      COMMUNICATION/EDUCATION:   The patient's plan of care was discussed with: registered nurse and     Patient Education  Education Given To: Patient;Family  Education Provided: Role of Therapy;Plan of Care;Home Exercise Program;Family Education;Equipment  Education Method: Verbal  Barriers to Learning: None  Education Outcome: Verbalized understanding;Continued education needed      TARIQ DOBBINS, PT  Minutes: 31

## 2024-05-31 NOTE — PROGRESS NOTES
Spiritual Care Assessment/Progress Note  [facility]    Name: Kassandra Díaz MRN: [id number]    Age: 89 y.o.     Sex: female   Language: English     Date: 5/31/2024            Total Time Calculated: 15 min              Spiritual Assessment begun in [facility] MED SURG  Service Provided For: Patient  Referral/Consult From: Palliative Care  Encounter Overview/Reason: Palliative Care    Spiritual beliefs:      [x] Involved in a tammie tradition/spiritual practice: Shinto       [] Supported by a tammie community:      [] Claims no spiritual orientation:      [] Seeking spiritual identity:           [] Adheres to an individual form of spirituality:      [] Not able to assess:                Identified resources for coping and support system:   Support System: Children, Family members, Palliative Care       [] Prayer                  [] Devotional reading               [] Music                  [] Guided Imagery     [] Pet visits                                        [] Other: (COMMENT)     Specific area/focus of visit   Encounter:    Crisis:    Spiritual/Emotional needs:    Ritual, Rites and Sacraments:    Grief, Loss, and Adjustments: Type: Adjustment to illness  Ethics/Mediation:    Behavioral Health:    Palliative Care: Type: Palliative Care, Initial/Spiritual Assessment  Advance Care Planning:      I visited Kassandra Díaz for a palliative initial spiritual health assessment. Her chart was consulted prior to the visit.  She lives with her daughter Eileen Díaz and grandson Bernard San. Patient has one other child, Lizett San. She is able to attend to most of her ADLS, receiving assistance from Eileen and Bernard for many needs.   She is a person of Shinto tammie and derives a sense of support and hope from her tammie.   Chaplain Kathleen, Leann, MS, BCC

## 2024-05-31 NOTE — CARE COORDINATION
Transition of Care Plan:    RUR: 13%    [facility] has accepted the patient, but is requesting more information regarding patient's diagnosis. Sent additional referrals to [facility] & [facility]. If patient discharges over the weekend, CM will need to secure home health agency.     RAMIRO CeballosN, RN, ONC, CMSRN  Nurse Care Manager [phone]

## 2024-06-01 LAB
ALBUMIN SERPL-MCNC: 3.1 G/DL (ref 3.5–5)
ALBUMIN/GLOB SERPL: 0.7 (ref 1.1–2.2)
ALP SERPL-CCNC: 89 U/L (ref 45–117)
ALT SERPL-CCNC: 27 U/L (ref 12–78)
ANION GAP SERPL CALC-SCNC: 2 MMOL/L (ref 5–15)
ANION GAP SERPL CALC-SCNC: 2 MMOL/L (ref 5–15)
AST SERPL-CCNC: 31 U/L (ref 15–37)
BASOPHILS # BLD: 0.1 K/UL (ref 0–0.1)
BASOPHILS NFR BLD: 1 % (ref 0–1)
BILIRUB SERPL-MCNC: 0.3 MG/DL (ref 0.2–1)
BUN SERPL-MCNC: 20 MG/DL (ref 6–20)
BUN SERPL-MCNC: 21 MG/DL (ref 6–20)
BUN/CREAT SERPL: 16 (ref 12–20)
BUN/CREAT SERPL: 18 (ref 12–20)
CALCIUM SERPL-MCNC: 11.4 MG/DL (ref 8.5–10.1)
CALCIUM SERPL-MCNC: 11.5 MG/DL (ref 8.5–10.1)
CHLORIDE SERPL-SCNC: 108 MMOL/L (ref 97–108)
CHLORIDE SERPL-SCNC: 109 MMOL/L (ref 97–108)
CO2 SERPL-SCNC: 31 MMOL/L (ref 21–32)
CO2 SERPL-SCNC: 31 MMOL/L (ref 21–32)
CREAT SERPL-MCNC: 1.16 MG/DL (ref 0.55–1.02)
CREAT SERPL-MCNC: 1.29 MG/DL (ref 0.55–1.02)
DIFFERENTIAL METHOD BLD: ABNORMAL
EKG ATRIAL RATE: 54 BPM
EKG DIAGNOSIS: NORMAL
EKG P AXIS: 94 DEGREES
EKG P-R INTERVAL: 164 MS
EKG Q-T INTERVAL: 400 MS
EKG QRS DURATION: 82 MS
EKG QTC CALCULATION (BAZETT): 379 MS
EKG R AXIS: -1 DEGREES
EKG T AXIS: 80 DEGREES
EKG VENTRICULAR RATE: 54 BPM
EOSINOPHIL # BLD: 0.1 K/UL (ref 0–0.4)
EOSINOPHIL NFR BLD: 1 % (ref 0–7)
ERYTHROCYTE [DISTWIDTH] IN BLOOD BY AUTOMATED COUNT: 11.8 % (ref 11.5–14.5)
GLOBULIN SER CALC-MCNC: 4.5 G/DL (ref 2–4)
GLUCOSE BLD STRIP.AUTO-MCNC: 115 MG/DL (ref 65–117)
GLUCOSE SERPL-MCNC: 103 MG/DL (ref 65–100)
GLUCOSE SERPL-MCNC: 163 MG/DL (ref 65–100)
HCT VFR BLD AUTO: 38.8 % (ref 35–47)
HGB BLD-MCNC: 12.8 G/DL (ref 11.5–16)
IMM GRANULOCYTES # BLD AUTO: 0.1 K/UL (ref 0–0.04)
IMM GRANULOCYTES NFR BLD AUTO: 1 % (ref 0–0.5)
LYMPHOCYTES # BLD: 1.4 K/UL (ref 0.8–3.5)
LYMPHOCYTES NFR BLD: 19 % (ref 12–49)
MAGNESIUM SERPL-MCNC: 2.4 MG/DL (ref 1.6–2.4)
MCH RBC QN AUTO: 30.2 PG (ref 26–34)
MCHC RBC AUTO-ENTMCNC: 33 G/DL (ref 30–36.5)
MCV RBC AUTO: 91.5 FL (ref 80–99)
MONOCYTES # BLD: 0.4 K/UL (ref 0–1)
MONOCYTES NFR BLD: 5 % (ref 5–13)
NEUTS SEG # BLD: 5.3 K/UL (ref 1.8–8)
NEUTS SEG NFR BLD: 73 % (ref 32–75)
NRBC # BLD: 0 K/UL (ref 0–0.01)
NRBC BLD-RTO: 0 PER 100 WBC
PLATELET # BLD AUTO: 198 K/UL (ref 150–400)
PMV BLD AUTO: 10.1 FL (ref 8.9–12.9)
POTASSIUM SERPL-SCNC: 3.1 MMOL/L (ref 3.5–5.1)
POTASSIUM SERPL-SCNC: 3.6 MMOL/L (ref 3.5–5.1)
PROT SERPL-MCNC: 7.6 G/DL (ref 6.4–8.2)
RBC # BLD AUTO: 4.24 M/UL (ref 3.8–5.2)
SERVICE CMNT-IMP: NORMAL
SODIUM SERPL-SCNC: 141 MMOL/L (ref 136–145)
SODIUM SERPL-SCNC: 142 MMOL/L (ref 136–145)
WBC # BLD AUTO: 7.3 K/UL (ref 3.6–11)

## 2024-06-01 PROCEDURE — 6360000002 HC RX W HCPCS: Performed by: FAMILY MEDICINE

## 2024-06-01 PROCEDURE — 2580000003 HC RX 258: Performed by: FAMILY MEDICINE

## 2024-06-01 PROCEDURE — 1100000000 HC RM PRIVATE

## 2024-06-01 PROCEDURE — 6370000000 HC RX 637 (ALT 250 FOR IP): Performed by: HOSPITALIST

## 2024-06-01 PROCEDURE — 6370000000 HC RX 637 (ALT 250 FOR IP): Performed by: FAMILY MEDICINE

## 2024-06-01 PROCEDURE — 6360000002 HC RX W HCPCS: Performed by: HOSPITALIST

## 2024-06-01 PROCEDURE — 36415 COLL VENOUS BLD VENIPUNCTURE: CPT

## 2024-06-01 PROCEDURE — 93010 ELECTROCARDIOGRAM REPORT: CPT | Performed by: INTERNAL MEDICINE

## 2024-06-01 PROCEDURE — 83735 ASSAY OF MAGNESIUM: CPT

## 2024-06-01 PROCEDURE — 82962 GLUCOSE BLOOD TEST: CPT

## 2024-06-01 PROCEDURE — 85025 COMPLETE CBC W/AUTO DIFF WBC: CPT

## 2024-06-01 PROCEDURE — 80053 COMPREHEN METABOLIC PANEL: CPT

## 2024-06-01 RX ORDER — INSULIN LISPRO 100 [IU]/ML
0-4 INJECTION, SOLUTION INTRAVENOUS; SUBCUTANEOUS NIGHTLY
Status: DISCONTINUED | OUTPATIENT
Start: 2024-06-01 | End: 2024-06-03 | Stop reason: HOSPADM

## 2024-06-01 RX ORDER — INSULIN LISPRO 100 [IU]/ML
0-4 INJECTION, SOLUTION INTRAVENOUS; SUBCUTANEOUS
Status: DISCONTINUED | OUTPATIENT
Start: 2024-06-02 | End: 2024-06-03 | Stop reason: HOSPADM

## 2024-06-01 RX ORDER — POTASSIUM CHLORIDE 750 MG/1
40 TABLET, FILM COATED, EXTENDED RELEASE ORAL 2 TIMES DAILY
Status: DISCONTINUED | OUTPATIENT
Start: 2024-06-01 | End: 2024-06-01

## 2024-06-01 RX ORDER — DEXTROSE MONOHYDRATE 100 MG/ML
INJECTION, SOLUTION INTRAVENOUS CONTINUOUS PRN
Status: DISCONTINUED | OUTPATIENT
Start: 2024-06-01 | End: 2024-06-03 | Stop reason: HOSPADM

## 2024-06-01 RX ORDER — SODIUM CHLORIDE AND POTASSIUM CHLORIDE 300; 900 MG/100ML; MG/100ML
INJECTION, SOLUTION INTRAVENOUS CONTINUOUS
Status: DISCONTINUED | OUTPATIENT
Start: 2024-06-01 | End: 2024-06-03

## 2024-06-01 RX ADMIN — SODIUM CHLORIDE, PRESERVATIVE FREE 10 ML: 5 INJECTION INTRAVENOUS at 08:42

## 2024-06-01 RX ADMIN — ROSUVASTATIN CALCIUM 10 MG: 10 TABLET, FILM COATED ORAL at 20:52

## 2024-06-01 RX ADMIN — POTASSIUM CHLORIDE AND SODIUM CHLORIDE: 900; 300 INJECTION, SOLUTION INTRAVENOUS at 15:42

## 2024-06-01 RX ADMIN — HEPARIN SODIUM 5000 UNITS: 5000 INJECTION INTRAVENOUS; SUBCUTANEOUS at 20:52

## 2024-06-01 RX ADMIN — SODIUM CHLORIDE, PRESERVATIVE FREE 10 ML: 5 INJECTION INTRAVENOUS at 20:55

## 2024-06-01 RX ADMIN — VALSARTAN 160 MG: 160 TABLET, FILM COATED ORAL at 08:41

## 2024-06-01 RX ADMIN — POTASSIUM CHLORIDE 40 MEQ: 750 TABLET, FILM COATED, EXTENDED RELEASE ORAL at 13:47

## 2024-06-01 RX ADMIN — AMLODIPINE BESYLATE 10 MG: 5 TABLET ORAL at 08:41

## 2024-06-01 RX ADMIN — WATER 1000 MG: 1 INJECTION INTRAMUSCULAR; INTRAVENOUS; SUBCUTANEOUS at 20:55

## 2024-06-01 RX ADMIN — METOPROLOL TARTRATE 50 MG: 25 TABLET, FILM COATED ORAL at 20:52

## 2024-06-01 RX ADMIN — ASPIRIN 81 MG: 81 TABLET, COATED ORAL at 08:41

## 2024-06-01 RX ADMIN — METOPROLOL TARTRATE 50 MG: 25 TABLET, FILM COATED ORAL at 08:41

## 2024-06-01 RX ADMIN — HEPARIN SODIUM 5000 UNITS: 5000 INJECTION INTRAVENOUS; SUBCUTANEOUS at 08:41

## 2024-06-01 RX ADMIN — EZETIMIBE 10 MG: 10 TABLET ORAL at 08:41

## 2024-06-01 ASSESSMENT — PAIN SCALES - GENERAL: PAINLEVEL_OUTOF10: 0

## 2024-06-01 NOTE — PROGRESS NOTES
Hospitalist Progress Note  Alonzo Tatum MD  Answering service: [phone]        Date of Service:  2024  NAME:  Kassandra Díaz  :  1931  MRN:  [id number]      Admission Summary:   Kassandra Díaz is a 89 y.o. female PMH HTN, dementia, CAD with h/o CABG, Hyperlipids who presents from home with concerns for failure to thrive per ED physician.  Family is no longer bedside when seen. Patient has had an ongoing decline and now refusing to go out, was complaining today of left hip pain with inability to bear weight. There was also concern noted for decreased appetite and alertness noted per the chart. Patient herself when seen states she hadn't felt like eating for the past couple days but she cannot tell me how long. She is aware of her name, that she is at [facility] but isn't aware of year and thinks president is Hayden. She denies pain when seen and denies nausea and feeling like she has a fever.          Interval history / Subjective:   Patient seen and examined today.  She is alert and oriented but not good with details.  She was assisted to a chair by PCT.         Assessment & Plan:     Acute metabolic encephalopathy in the setting of Alzheimer's dementia.  Neuroexam nonfocal.  Head CT negative for anything acute  -Continue antibiotics for UTI, supportive therapy.      Klebsiella UTI: Continue ceftriaxone    Hypokalemia: Improved, continue to replete.  BMP in a.m.      HTN- continue norvasc and metoprolol, hold ARB follow up renal fxn and ensure stable, may need restart if BP remains high    CAD- continue statin, ASA    Hip pain.  CXR negative.  I observed her when PCP was helping her to a chair patient seems to be dragging the left leg.  Will check pelvic CT rule out fracture and other pathologies    Persistent hypercalcemia: Obtain PTH    Prediabetes: [facility] inpatient hyperglycemic management with  Accu-Cheks AC&HS, Humalog correction scale, hypoglycemic protocol.     Code status: full   Prophylaxis: sc lovenox   Care Plan discussed with: pt, daughter, granddaughter, CM.   Anticipated Disposition: likely HH in 1-2 days with urine culture final   Central Line:   none              Review of Systems:   Pertinent items are noted in HPI.         Vital Signs:    Last 24hrs VS reviewed since prior progress note. Most recent are:  Vitals:    06/01/24 1525   BP: (!) 140/57   Pulse: 63   Resp: 16   Temp: 98.1 °F (36.7 °C)   SpO2: 100%       No intake or output data in the 24 hours ending 06/01/24 1953       Physical Examination:     I had a face to face encounter with this patient and independently examined them on 6/1/2024 as outlined below:          General : awake, o to name only , no acute distress,   HEENT: PEERL, EOMI, moist mucus membrane, TM clear  Neck: supple, no JVD, no meningeal signs  Chest: Clear to auscultation bilaterally   CVS: S1 S2 heard, Capillary refill less than 2 seconds  Abd: soft/ non tender, non distended, BS physiological,   Ext: no clubbing, no cyanosis, no edema, brisk 2+ DP pulses  Neuro/Psych: Alert, oriented.  Dragged left leg when she moved from bed to chair with assistance.           Data Review:    Review and/or order of clinical lab test    I have independently reviewed and interpreted patient's lab and all other diagnostic data    Notes reviewed from all clinical/nonclinical/nursing services involved in patient's clinical care. Care coordination discussions were held with appropriate clinical/nonclinical/ nursing providers based on care coordination needs.     Labs:     Recent Labs     05/31/24  0554 06/01/24  1459   WBC 6.8 7.3   HGB 11.3* 12.8   HCT 33.8* 38.8    198       Recent Labs     05/31/24  0554 06/01/24  0600 06/01/24  1459    142 141   K 3.0* 3.1* 3.6   * 109* 108   CO2 30 31 31   BUN 19 21* 20   MG 2.2 2.4  --        Recent Labs     05/29/24 2103

## 2024-06-01 NOTE — PLAN OF CARE
Problem: Safety - Adult  Goal: Free from fall injury  Outcome: Progressing  Flowsheets (Taken 6/1/2024 0933)  Free From Fall Injury: Instruct family/caregiver on patient safety     Problem: Discharge Planning  Goal: Discharge to home or other facility with appropriate resources  Recent Flowsheet Documentation  Taken 5/31/2024 2108 by Gwen Fulton, RN  Discharge to home or other facility with appropriate resources: Identify barriers to discharge with patient and caregiver

## 2024-06-02 ENCOUNTER — APPOINTMENT (OUTPATIENT)
Facility: HOSPITAL | Age: 89
DRG: 689 | End: 2024-06-02
Payer: MEDICARE

## 2024-06-02 LAB
ALBUMIN SERPL-MCNC: 2.8 G/DL (ref 3.5–5)
ALBUMIN/GLOB SERPL: 0.8 (ref 1.1–2.2)
ALP SERPL-CCNC: 74 U/L (ref 45–117)
ALT SERPL-CCNC: 29 U/L (ref 12–78)
ANION GAP SERPL CALC-SCNC: ABNORMAL MMOL/L (ref 5–15)
AST SERPL-CCNC: 32 U/L (ref 15–37)
BILIRUB SERPL-MCNC: 0.3 MG/DL (ref 0.2–1)
BUN SERPL-MCNC: 18 MG/DL (ref 6–20)
BUN/CREAT SERPL: 18 (ref 12–20)
CALCIUM SERPL-MCNC: 11.1 MG/DL (ref 8.5–10.1)
CALCIUM SERPL-MCNC: 11.2 MG/DL (ref 8.5–10.1)
CHLORIDE SERPL-SCNC: 117 MMOL/L (ref 97–108)
CO2 SERPL-SCNC: 28 MMOL/L (ref 21–32)
CREAT SERPL-MCNC: 1 MG/DL (ref 0.55–1.02)
EST. AVERAGE GLUCOSE BLD GHB EST-MCNC: 128 MG/DL
GLOBULIN SER CALC-MCNC: 3.3 G/DL (ref 2–4)
GLUCOSE BLD STRIP.AUTO-MCNC: 102 MG/DL (ref 65–117)
GLUCOSE BLD STRIP.AUTO-MCNC: 118 MG/DL (ref 65–117)
GLUCOSE BLD STRIP.AUTO-MCNC: 122 MG/DL (ref 65–117)
GLUCOSE BLD STRIP.AUTO-MCNC: 178 MG/DL (ref 65–117)
GLUCOSE SERPL-MCNC: 108 MG/DL (ref 65–100)
HBA1C MFR BLD: 6.1 % (ref 4–5.6)
POTASSIUM SERPL-SCNC: 4.4 MMOL/L (ref 3.5–5.1)
PROT SERPL-MCNC: 6.1 G/DL (ref 6.4–8.2)
PTH-INTACT SERPL-MCNC: 157.1 PG/ML (ref 18.4–88)
SERVICE CMNT-IMP: ABNORMAL
SERVICE CMNT-IMP: NORMAL
SODIUM SERPL-SCNC: 144 MMOL/L (ref 136–145)

## 2024-06-02 PROCEDURE — 6370000000 HC RX 637 (ALT 250 FOR IP): Performed by: HOSPITALIST

## 2024-06-02 PROCEDURE — 83970 ASSAY OF PARATHORMONE: CPT

## 2024-06-02 PROCEDURE — 6360000002 HC RX W HCPCS: Performed by: FAMILY MEDICINE

## 2024-06-02 PROCEDURE — 83036 HEMOGLOBIN GLYCOSYLATED A1C: CPT

## 2024-06-02 PROCEDURE — 2580000003 HC RX 258: Performed by: FAMILY MEDICINE

## 2024-06-02 PROCEDURE — 36415 COLL VENOUS BLD VENIPUNCTURE: CPT

## 2024-06-02 PROCEDURE — 72192 CT PELVIS W/O DYE: CPT

## 2024-06-02 PROCEDURE — 1200000000 HC SEMI PRIVATE

## 2024-06-02 PROCEDURE — 6370000000 HC RX 637 (ALT 250 FOR IP): Performed by: FAMILY MEDICINE

## 2024-06-02 PROCEDURE — 80053 COMPREHEN METABOLIC PANEL: CPT

## 2024-06-02 PROCEDURE — 82962 GLUCOSE BLOOD TEST: CPT

## 2024-06-02 PROCEDURE — 6360000002 HC RX W HCPCS: Performed by: HOSPITALIST

## 2024-06-02 RX ADMIN — HEPARIN SODIUM 5000 UNITS: 5000 INJECTION INTRAVENOUS; SUBCUTANEOUS at 21:29

## 2024-06-02 RX ADMIN — ROSUVASTATIN CALCIUM 10 MG: 10 TABLET, FILM COATED ORAL at 21:29

## 2024-06-02 RX ADMIN — EZETIMIBE 10 MG: 10 TABLET ORAL at 09:06

## 2024-06-02 RX ADMIN — VALSARTAN 160 MG: 160 TABLET, FILM COATED ORAL at 09:06

## 2024-06-02 RX ADMIN — METOPROLOL TARTRATE 50 MG: 25 TABLET, FILM COATED ORAL at 09:06

## 2024-06-02 RX ADMIN — POTASSIUM CHLORIDE AND SODIUM CHLORIDE: 900; 300 INJECTION, SOLUTION INTRAVENOUS at 18:33

## 2024-06-02 RX ADMIN — SODIUM CHLORIDE, PRESERVATIVE FREE 10 ML: 5 INJECTION INTRAVENOUS at 09:06

## 2024-06-02 RX ADMIN — AMLODIPINE BESYLATE 10 MG: 5 TABLET ORAL at 09:06

## 2024-06-02 RX ADMIN — METOPROLOL TARTRATE 50 MG: 25 TABLET, FILM COATED ORAL at 21:29

## 2024-06-02 RX ADMIN — ASPIRIN 81 MG: 81 TABLET, COATED ORAL at 09:06

## 2024-06-02 RX ADMIN — HEPARIN SODIUM 5000 UNITS: 5000 INJECTION INTRAVENOUS; SUBCUTANEOUS at 09:06

## 2024-06-02 RX ADMIN — WATER 1000 MG: 1 INJECTION INTRAMUSCULAR; INTRAVENOUS; SUBCUTANEOUS at 21:28

## 2024-06-02 ASSESSMENT — PAIN SCALES - GENERAL: PAINLEVEL_OUTOF10: 0

## 2024-06-02 NOTE — PROGRESS NOTES
Hospitalist Progress Note  Alonzo Tatum MD  Answering service: [phone]        Date of Service:  2024  NAME:  Kassandra Díaz  :  1931  MRN:  [id number]      Admission Summary:   Kassandra Díaz is a 89 y.o. female PMH HTN, dementia, CAD with h/o CABG, Hyperlipids who presents from home with concerns for failure to thrive per ED physician.  Family is no longer bedside when seen. Patient has had an ongoing decline and now refusing to go out, was complaining today of left hip pain with inability to bear weight. There was also concern noted for decreased appetite and alertness noted per the chart. Patient herself when seen states she hadn't felt like eating for the past couple days but she cannot tell me how long. She is aware of her name, that she is at [facility] but isn't aware of year and thinks president is Hayden. She denies pain when seen and denies nausea and feeling like she has a fever.          Interval history / Subjective:   I saw and examined patient earlier on 2 N. before she got transferred to [address] [place] [address] unit  Her daughter and grandson were present.  Patient does not have any specific complaints today.         Assessment & Plan:     Acute metabolic encephalopathy in the setting of Alzheimer's dementia.  Neuroexam nonfocal.  Head CT negative for anything acute  -Continue antibiotics for UTI, supportive therapy.      Klebsiella UTI: Continue ceftriaxone    Hypokalemia: Improved, continue to replete.  BMP in a.m.      HTN- continue norvasc and metoprolol, hold ARB follow up renal fxn and ensure stable, may need restart if BP remains high    CAD- continue statin, ASA    Left hip pain with gait disturbance.  X-ray negative.  -CAT scan of the pelvis showed bilateral hip chondrocalcinosis consistent with CPPD.  Moderate bilateral hip osteoarthrosis.  Moderate left hip effusion.  Severe diffuse osteopenia

## 2024-06-02 NOTE — PLAN OF CARE
Problem: Skin/Tissue Integrity  Goal: Absence of new skin breakdown  Description: 1.  Monitor for areas of redness and/or skin breakdown  2.  Assess vascular access sites hourly  3.  Every 4-6 hours minimum:  Change oxygen saturation probe site  4.  Every 4-6 hours:  If on nasal continuous positive airway pressure, respiratory therapy assess nares and determine need for appliance change or resting period.  Outcome: Progressing     Problem: Safety - Adult  Goal: Free from fall injury  Recent Flowsheet Documentation  Taken 6/2/2024 1025 by Reshma Magaña, RN  Free From Fall Injury: Instruct family/caregiver on patient safety

## 2024-06-03 VITALS
BODY MASS INDEX: 17.36 KG/M2 | WEIGHT: 98 LBS | OXYGEN SATURATION: 98 % | RESPIRATION RATE: 16 BRPM | HEART RATE: 61 BPM | DIASTOLIC BLOOD PRESSURE: 58 MMHG | TEMPERATURE: 97.9 F | SYSTOLIC BLOOD PRESSURE: 118 MMHG | HEIGHT: 63 IN

## 2024-06-03 LAB
25(OH)D3 SERPL-MCNC: 27.9 NG/ML (ref 30–100)
GLUCOSE BLD STRIP.AUTO-MCNC: 100 MG/DL (ref 65–117)
GLUCOSE BLD STRIP.AUTO-MCNC: 134 MG/DL (ref 65–117)
GLUCOSE BLD STRIP.AUTO-MCNC: 161 MG/DL (ref 65–117)
SERVICE CMNT-IMP: ABNORMAL
SERVICE CMNT-IMP: ABNORMAL
SERVICE CMNT-IMP: NORMAL

## 2024-06-03 PROCEDURE — 2580000003 HC RX 258: Performed by: FAMILY MEDICINE

## 2024-06-03 PROCEDURE — 82962 GLUCOSE BLOOD TEST: CPT

## 2024-06-03 PROCEDURE — 6370000000 HC RX 637 (ALT 250 FOR IP): Performed by: HOSPITALIST

## 2024-06-03 PROCEDURE — 97116 GAIT TRAINING THERAPY: CPT

## 2024-06-03 PROCEDURE — 6360000002 HC RX W HCPCS: Performed by: FAMILY MEDICINE

## 2024-06-03 PROCEDURE — 82306 VITAMIN D 25 HYDROXY: CPT

## 2024-06-03 PROCEDURE — 6370000000 HC RX 637 (ALT 250 FOR IP): Performed by: FAMILY MEDICINE

## 2024-06-03 PROCEDURE — 36415 COLL VENOUS BLD VENIPUNCTURE: CPT

## 2024-06-03 RX ORDER — METHYLPREDNISOLONE 4 MG/1
TABLET ORAL
Qty: 1 KIT | Refills: 0 | Status: SHIPPED | OUTPATIENT
Start: 2024-06-03 | End: 2024-06-09

## 2024-06-03 RX ORDER — CASTOR OIL AND BALSAM, PERU 788; 87 MG/G; MG/G
OINTMENT TOPICAL 2 TIMES DAILY
Status: DISCONTINUED | OUTPATIENT
Start: 2024-06-03 | End: 2024-06-03 | Stop reason: HOSPADM

## 2024-06-03 RX ORDER — CEFUROXIME AXETIL 250 MG/1
250 TABLET ORAL 2 TIMES DAILY
Qty: 6 TABLET | Refills: 0 | Status: SHIPPED | OUTPATIENT
Start: 2024-06-03 | End: 2024-06-06

## 2024-06-03 RX ORDER — FAMOTIDINE 20 MG/1
20 TABLET, FILM COATED ORAL DAILY
Qty: 10 TABLET | Refills: 0 | Status: SHIPPED | OUTPATIENT
Start: 2024-06-03 | End: 2024-06-14

## 2024-06-03 RX ADMIN — SODIUM CHLORIDE, PRESERVATIVE FREE 10 ML: 5 INJECTION INTRAVENOUS at 08:40

## 2024-06-03 RX ADMIN — AMLODIPINE BESYLATE 10 MG: 5 TABLET ORAL at 08:40

## 2024-06-03 RX ADMIN — ASPIRIN 81 MG: 81 TABLET, COATED ORAL at 08:40

## 2024-06-03 RX ADMIN — VALSARTAN 160 MG: 160 TABLET, FILM COATED ORAL at 08:40

## 2024-06-03 RX ADMIN — EZETIMIBE 10 MG: 10 TABLET ORAL at 08:40

## 2024-06-03 RX ADMIN — METOPROLOL TARTRATE 50 MG: 25 TABLET, FILM COATED ORAL at 08:40

## 2024-06-03 RX ADMIN — HEPARIN SODIUM 5000 UNITS: 5000 INJECTION INTRAVENOUS; SUBCUTANEOUS at 08:40

## 2024-06-03 NOTE — DISCHARGE SUMMARY
Discharge Summary       PATIENT ID: Kassandra Díaz  MRN: [id number]   YOB: 1931    DATE OF ADMISSION: 5/29/2024  9:12 PM    DATE OF DISCHARGE: 06/03/24     PRIMARY CARE PROVIDER: Theodora Angela MD     ATTENDING PHYSICIAN: Alonzo Tatum MD    DISCHARGING PROVIDER: Alonzo Tatum MD    To contact this individual call [phone] and ask the  to page.  If unavailable ask to be transferred the Adult Hospitalist Department.    CONSULTATIONS: IP CONSULT TO PALLIATIVE CARE  IP CONSULT TO SOCIAL WORK  IP CONSULT TO CASE MANAGEMENT  IP CONSULT TO CASE MANAGEMENT  IP WOUND CARE NURSE CONSULT TO EVAL  IP CONSULT TO ORTHOPEDIC SURGERY    PROCEDURES/SURGERIES: * No surgery found *     ADMITTING DIAGNOSES & HOSPITAL COURSE:   Admission Summary:   Kassandra Díaz is a 89 y.o. female PMH HTN, dementia, CAD with h/o CABG, Hyperlipids who presents from home with concerns for failure to thrive per ED physician.  Family is no longer bedside when seen. Patient has had an ongoing decline and now refusing to go out, was complaining today of left hip pain with inability to bear weight. There was also concern noted for decreased appetite and alertness noted per the chart. Patient herself when seen states she hadn't felt like eating for the past couple days but she cannot tell me how long. She is aware of her name, that she is at [facility] but isn't aware of year and thinks president is Hayden. She denies pain when seen and denies nausea and feeling like she has a fever.           DISCHARGE DIAGNOSES / PLAN:      Acute metabolic encephalopathy in the setting of Alzheimer's dementia.  Neuroexam nonfocal.  Head CT negative for anything acute  -Treat UTI.        Klebsiella UTI: Received ceftriaxone inpatient, discharged on Ceftin to complete course of treatment.     Hypokalemia: Replaced and corrected.        HTN- continue norvasc and metoprolol, hold ARB follow up renal fxn and ensure stable, may need restart if BP remains

## 2024-06-03 NOTE — PROGRESS NOTES
Full note to follow; understand patient to have some LLE weakness, effusion seen on imaging. The patient is ambulatory and does not complain of hip pain during my encounter. Her exam findings are not consistent with any severe arthritis exacerbation, and she has no focal neurologic findings at this time; Injection will not be useful as she as no pain.    AUSTIN Thomas  Orthopedic Trauma Service  [facility]

## 2024-06-03 NOTE — PROGRESS NOTES
Discharge instructions reviewed with patient and patient's daughter. Time given for questions. Patient and patient's daughter verbalized understanding.

## 2024-06-03 NOTE — PROGRESS NOTES
Comprehensive Nutrition Assessment    Type and Reason for Visit: Reassess    Nutrition Recommendations/Plan:   Adjust to Minced and moist diet, limit other therapeutic restrictions to promote PO intakes   Ensure Plus HP 3x/day    Monitor need to add appetite stimulant to assist w/ PO intakes; Consider addition of MVI and bowel regimen -- no BM x5d    Please document % intake of intakes in flowsheets       Malnutrition Assessment:  Malnutrition Status:  Severe malnutrition (05/30/24 1319)    Context:  Social/Environmental Circumstances     Findings of the 6 clinical characteristics of malnutrition:  Energy Intake:  Mild decrease in energy intake (Comment) (poor appetite/intakes x 2 wks per pt/family report)  Weight Loss:  Unable to assess (differring time-frame of wt loss per pt/family report and wt hx per EMR. Suspect wt loss however, RD unable to objectively determine with given data)     Body Fat Loss:  Severe body fat loss Orbital, Triceps, Buccal region   Muscle Mass Loss:  Severe muscle mass loss Thigh (quadraceps), Clavicles (pectoralis & deltoids), Temples (temporalis), Hand (interosseous)  Fluid Accumulation:  No significant fluid accumulation     Strength:  Not Performed       Nutrition Assessment:    PMH: CAD s/p CABG, HTN, HLD. NKFA    6/3-  F/U. RD observed <25% of B tray consumed, pt roommate stated \"she can't feed herself\", family in room stated food is too difficult to for pt to chew, has ill-fitting dentures. I/O finding avg intakes 26-50% of meals. Per RN, already adjusted diet to M&M, pending replacement tray at time of interview. Noted ONS on breakfast tray also not consumed, pt stated she didn't drink it b/c the tray \"came up late. Helped to to start drinking her Lunch Ensure, provided straw and pt able to hold up and drink by herself. Will monitor for improvements to intakes with texture change.      5/30- Presents w/ concerns for FTT, pending palliative consult for goals of care per H&P.    Chart reviewed for low BMI. Family in room, reports that pt has been with a poor appetite/intake for ~2 wks. During this time, pt was consuming a small breakfast (egg white and sausage), with minimal intakes at L/D 2/2 decreased appetite. Family also reports pt's wt trending down from 105# to CBW (98#s) ongoing over the past 2 months or longer - ~8% wt loss - nutritionally significant. Weight trends consistent per chart review however, over a longer time-frame (~6% wt loss x 9 months - not nutritionally significant given time-frame). Family reports that pt has been consuming Ensure at home - offering one with breakfast and one with dinner; unsure of the type of Ensure but they suspect Ensure Enlive or Ensure Plus HP. Will continue home ONS regimen, increasing to 3x/day - prefers chocolate. Pt c/o constipation, LBM yesterday per pt report (small amount). Pt usual home regimen of glycolax however, pt and family advised by their MD to discontinue. No reported nausea/vomiting nor chewing/swallowing difficulties. RD to adjust diet to regular to encourage PO intakes.       Nutritionally Significant Medications:  Ceftriaxone, Zetia, valsartan.         Estimated Daily Nutrient Needs:  Energy Requirements Based On: Kcal/kg  Weight Used for Energy Requirements: Current  Energy (kcal/day): 1558 kcal/day (35 kcal/kg - FTT/severe maln/low BMI)  Weight Used for Protein Requirements: Current  Protein (g/day): 67 g/day (1.5 g/kg)  Method Used for Fluid Requirements: 1 ml/kcal  Fluid (ml/day): 1558 ml/day (1ml/kcal)    Nutrition Related Findings:   Edema: None                      Recent Labs     06/01/24  0600 06/01/24  1459 06/02/24  0608   BUN 21* 20 18    141 144   K 3.1* 3.6 4.4   * 108 117*   CO2 31 31 28   MG 2.4  --   --        Recent Labs     06/01/24  1459 06/02/24  0608   ALT 27 29   AST 31 32   GLOB 4.5* 3.3       Recent Labs     06/01/24  1459   WBC 7.3   HGB 12.8   HCT 38.8        Last BM:   ?

## 2024-06-03 NOTE — PLAN OF CARE
Problem: Physical Therapy - Adult  Goal: By Discharge: Performs mobility at highest level of function for planned discharge setting.  See evaluation for individualized goals.  Description: FUNCTIONAL STATUS PRIOR TO ADMISSION: Patient was modified independent using a single point cane for functional mobility.    HOME SUPPORT PRIOR TO ADMISSION: The patient lived with family and has had to have increased assist over the last weeks  .    Physical Therapy Goals  Initiated 5/30/2024  1.  Patient will move from supine to sit and sit to supine and roll side to side in bed with supervision/set-up within 7 day(s).    2.  Patient will perform sit to stand with supervision/set-up within 7 day(s).  3.  Patient will transfer from bed to chair and chair to bed with supervision/set-up using the least restrictive device within 7 day(s).  4.  Patient will ambulate with supervision/set-up for 50 feet with the least restrictive device within 7 day(s).   5.  Patient will ascend/descend 2 stairs with 1 handrail(s) with contact guard assist within 7 day(s).    Outcome: Progressing     PHYSICAL THERAPY TREATMENT    Patient: Kassandra Díaz (89 y.o. female)  Date: 6/3/2024  Diagnosis: Delirium [R41.0]  UTI (urinary tract infection) [N39.0]  Failure to thrive in adult [R62.7]  Urinary tract infection without hematuria, site unspecified [N39.0] UTI (urinary tract infection)      Precautions: Fall Risk                      ASSESSMENT:  Patient continues to benefit from skilled PT services and is slowly progressing towards goals. Pt continues to present with impaired balance, decreased strength, and decreased endurance, limiting functional mobility tolerance. Pt requires hands-on support at all times 2/2 balance impairment and requires additional time to complete tasks. If family can provide s/a for all functional mobility, rec return home with HH. Will continue to follow.          PLAN:  Patient continues to benefit from skilled intervention  no apparent distress in bed, Call bell within reach, Bed/ chair alarm activated, Side rails x3, and Heels elevated for pressure relief      COMMUNICATION/EDUCATION:   The patient's plan of care was discussed with: registered nurse    Patient Education  Education Given To: Patient  Education Provided: Role of Therapy;Plan of Care;Equipment;Fall Prevention Strategies  Education Method: Verbal  Barriers to Learning: None  Education Outcome: Verbalized understanding;Continued education needed      Nuris Goodman, PT  Minutes: 15

## 2024-06-03 NOTE — WOUND CARE
WOCN Note:     New consult for sacrum.   Seen in 510/01    89 y.o. y/o female admitted on 5/29/2024   Admitted for UTI & FTT   History of CABG   No indication for wound culture  Diet: Reg           Assessment:   Communicative and eating breakfast.  Reports no pain.   Able to turn independently onto left & right.    Incontinent of urine and was cleaned with cream applied.  Brief left off.  Surface: versacare foam mattress    Bilateral heels intact and without erythema.  Heels offloaded with pillows.    Buttocks and sacrum intact without erythema.  Some areas of hypopigmentation and dyschromia.        Recommendations:    Turn/reposition approximately every 2 hours  Offload heels with heels hanging off end of pillow at all times while in bed.   Venelex to buttocks and sacrum twice daily    No concerns to relay to provider.    Transition of Care: Plan to follow weekly and as needed while admitted to hospital.     RMAIRO CapellanN, RN, CWOCN  Certified Wound, Ostomy, Continence Nurse  office [phone]  Available via [employer]

## 2024-06-03 NOTE — CONSULTS
ORTHO CONSULT NOTE    Date of Consultation:  2024  Referring Physician:  ***  CC: ***    HPI:  Kassandra Díaz is a 89 y.o. female who c/o ***.  Current Anticoagulant Medications: {ANTICOAG MEDS:[id number]}.  Past Medical History:   Diagnosis Date    CAD (coronary artery disease)     CAD (coronary artery disease) 01/15/2019    stents to LAD, LCX graft    Environmental allergies 2012    HTN (hypertension) 2012    Hypercholesteremia     Hyperlipidemia 2012    Hypertension     S/P CABG x 3 2012      Past Surgical History:   Procedure Laterality Date    CAROTID STENT PLACEMENT N/A     TN UNLISTED PROCEDURE CARDIAC SURGERY      bypass      Family History   Problem Relation Age of Onset    Diabetes Father     Stroke Father     Cancer Brother 59        esophageal cancer    Heart Disease Mother     Hypertension Mother       Social History     Tobacco Use    Smoking status: Former     Current packs/day: 0.00     Types: Cigarettes     Quit date: 1975     Years since quittin.4    Smokeless tobacco: Never   Substance Use Topics    Alcohol use: No     Allergies   Allergen Reactions    Aspirin Hives     Low dose aspirin is ok.    Penicillins Rash        Review of Systems:  Per HPI.    Objective:     Patient Vitals for the past 8 hrs:   BP Temp Temp src Pulse Resp SpO2   24 1544 136/61 98.1 °F (36.7 °C) Oral 67 16 100 %   24 1531 123/60 98.6 °F (37 °C) Oral 69 14 97 %     Temp (24hrs), Av.3 °F (36.8 °C), Min:97.3 °F (36.3 °C), Max:98.8 °F (37.1 °C)      EXAM:   NAD. Answers questions appropriately.  Moves BUE spontaneously with NTTP long bones and joints.  ***LE no pain PROM, NTTP long bones and joints.  Moves foot OK with SILT and CR toes < 2 secs.  ***LE shortened and externally rotated.    Hip skin intact and soft compartments.  Knee, ankle and foot NTTP. Moves foot OK with SILT and CR toes < 2 secs.  Bilat calf soft and NTTP.    Imaging Review:   CT PELVIS WO CONTRAST Additional  Value Ref Range    Sodium 144 136 - 145 mmol/L    Potassium 4.4 3.5 - 5.1 mmol/L    Chloride 117 (H) 97 - 108 mmol/L    CO2 28 21 - 32 mmol/L    Anion Gap NEG 1 5 - 15 mmol/L    Glucose 108 (H) 65 - 100 mg/dL    BUN 18 6 - 20 MG/DL    Creatinine 1.00 0.55 - 1.02 MG/DL    BUN/Creatinine Ratio 18 12 - 20      Est, Glom Filt Rate 53 (L) >60 ml/min/1.73m2    Calcium 11.1 (H) 8.5 - 10.1 MG/DL    Total Bilirubin 0.3 0.2 - 1.0 MG/DL    ALT 29 12 - 78 U/L    AST 32 15 - 37 U/L    Alk Phosphatase 74 45 - 117 U/L    Total Protein 6.1 (L) 6.4 - 8.2 g/dL    Albumin 2.8 (L) 3.5 - 5.0 g/dL    Globulin 3.3 2.0 - 4.0 g/dL    Albumin/Globulin Ratio 0.8 (L) 1.1 - 2.2     POCT Glucose    Collection Time: 06/02/24  6:12 AM   Result Value Ref Range    POC Glucose 102 65 - 117 mg/dL    Performed by: STEVE Angulo    POCT Glucose    Collection Time: 06/02/24 11:19 AM   Result Value Ref Range    POC Glucose 178 (H) 65 - 117 mg/dL    Performed by: Elida Lovell    POCT Glucose    Collection Time: 06/02/24  5:19 PM   Result Value Ref Range    POC Glucose 118 (H) 65 - 117 mg/dL    Performed by: FELTON Mooney    POCT Glucose    Collection Time: 06/02/24  9:33 PM   Result Value Ref Range    POC Glucose 122 (H) 65 - 117 mg/dL    Performed by: WATSON Sandoval        Impression:     ***    Plan:   I explained the nature of the injury and discussed the recommended surgery.  I discussed potential risks/benefits/alternatives of surgery as well as expected hospital course.  Patient consents.    Plan for ***.    Medical evaluation/clearance pending.  Bedrest.  NPO p MN.  Ice, APAP, Oxy, Dilaudid  SCDs OK. Hold Chemical DVT ppx       *** is aware and agrees with above plan.      AUSTIN Thomas  Orthopedic Trauma Service  [facility]

## 2024-06-03 NOTE — ACP (ADVANCE CARE PLANNING)
Advance Care Planning     Advance Care Planning (ACP) Physician/NP/PA Conversation    Date of Conversation: 5/29/2024  Conducted with: Patient with Decision Making Capacity, Healthcare Decision Maker, and Legal next of kin  Patient herself, her daughter Lizett and her grandson Luciano all confirmed that Holli sees primary decision maker/POA  Other persons present: None    Healthcare Decision Maker:       Primary Decision Maker: Luciano Díaz - Grandchild - [phone]    Secondary Decision Maker: Lizett San - Child - [phone]  Click here to complete Healthcare Decision Makers including selection of the Healthcare Decision Maker Relationship (ie \"Primary\").   Today we documented Decision Maker(s) consistent with Legal Next of Kin hierarchy.    Care Preferences:    Hospitalization:  \"If your health worsens and it becomes clear that your chance of recovery is unlikely, what would be your preference regarding hospitalization?\"  The patient would prefer hospitalization.    Ventilation:  \"If you were unable to breath on your own and your chance of recovery was unlikely, what would be your preference about the use of a ventilator (breathing machine) if it was available to you?\"  The patient would NOT desire the use of a ventilator.    Resuscitation:  \"In the event your heart stopped as a result of an underlying serious health condition, would you want attempts made to restart your heart, or would you prefer a natural death?\"  No, do NOT attempt to resuscitate.    treatment goals, benefit/burden of treatment options, and resuscitation preferences    Conversation Outcomes / Follow-Up Plan:  ACP in process - information provided, considering goals and options  Reviewed DNR/DNI and patient confirms current DNR status - completed forms on file (place new order if needed)    Length of Voluntary ACP Conversation in minutes:  20 minutes    Alonzo Tatum MD

## 2024-06-03 NOTE — ACP (ADVANCE CARE PLANNING)
Advance Care Planning     Advance Care Planning Inpatient Note  [facility] Department    Today's Date: 6/3/2024  Unit: [facility] 5E2 SURGICAL UNIT    Received request from IDT Member.  Upon review of chart and communication with care team, patient's decision making abilities are not in question.. Patient was/were present in the room during visit.    Goals of ACP Conversation:  Discuss advance care planning documents  Facilitate a discussion related to patient's goals of care as they align with the patient's values and beliefs.    Health Care Decision Makers:       Primary Decision Maker: Luciano Díaz - Grandchild - [phone]    Secondary Decision Maker: Lizett San - Child - [phone]  Summary:  Completed New Documents    Advance Care Planning Documents (Patient Wishes):  Healthcare Power of /Advance Directive Appointment of Health Care Agent     Assessment:  I visited Kassandra Díaz  for an AMD conversation. The conversation was started last week by palliative Dr Angulo. The patient choose to complete an AMD, naming her grandson, Luciano Díaz ([phone]) as MPOA and daughter Lizett San ([phone]) as secondary.     Interventions:  Provided education on documents for clarity and greater understanding  Discussed and provided education on state decision maker hierarchy  Assisted in the completion of documents according to patient's wishes at this time  Encouraged ongoing ACP conversation with future decision makers and loved ones    Care Preferences Communicated:   No    Outcomes/Plan:  New advance directive completed.  Returned original document(s) to patient, as well as copies for distribution to appointed agents  Copy of advance directive given to staff to scan into medical record.  Teach Back Method used to verify the patient's and/or Healthcare Decision Maker's understanding of key information in the advance directive documents    Electronically signed by NANETTE HUBER on 6/3/2024 at  11:49 AM

## 2024-06-03 NOTE — PROGRESS NOTES
I visited Kassandra MARTHA Arjun  for an AMD conversation. The conversation was started last week by palliative Dr Angulo. The patient choose to complete an AMD, naming her grandson, Luciano Díaz ([phone]) as MPOA and daughter Lizett San ([phone]) as secondary.   Chaplain Wang MDiv, MS, BCC

## 2024-06-03 NOTE — DISCHARGE INSTRUCTIONS
Discharge Instructions       PATIENT ID: Kassandra Díaz  MRN: [id number]   YOB: 1931    DATE OF ADMISSION: 5/29/2024   DATE OF DISCHARGE: 6/3/2024    PRIMARY CARE PROVIDER: Theodora Angela     ATTENDING PHYSICIAN: Alonzo Tatum MD   DISCHARGING PROVIDER: Alonzo Tatum MD    To contact this individual call [phone] and ask the  to page.   If unavailable ask to be transferred the Adult Hospitalist Department.    DISCHARGE DIAGNOSES   You were admitted and treated for urinary tract infection, hypokalemia (low potassium).  He also had some confusion due to the above.Your symptoms have improved.  Please complete the prescribed antibiotics as directed.       Left hip arthritis, a CT scan of the pelvis was obtained and it showed evidence of arthritis with joint effusion.  You were evaluated by orthopedic surgery, no injection or surgical intervention indicated.  You may use Tylenol as needed for pain control.  You are also prescribed steroid (Medrol pack).  An electronic prescription is sent to your [facility] pharmacy.  Please follow-up with your primary doctor, if you develop significant pain, you will need a referral to an orthopedic for further treatment such as        Incidentally, you were found to have elevated calcium with high PTH suggestive of primary hyperparathyroidism.  Please ask your primary doctor for referral to an ENT, you will need further evaluation and discuss treatment options with ENT.       Vitamin D insufficiency: Take vitamin D supplements    Please take this discharge paper with you for your PCP appointment.      CONSULTATIONS: Orthopedics    PROCEDURES/SURGERIES: * No surgery found *    PENDING TEST RESULTS:   At the time of discharge the following test results are still pending: none    FOLLOW UP APPOINTMENTS:   PCP within 1 week    ADDITIONAL CARE RECOMMENDATIONS:     DIET: regular diet and cardiac diet    ACTIVITY: activity as tolerated  Home health therapy  services        EQUIPMENT needed: own      DISCHARGE MEDICATIONS:   See Medication Reconciliation Form    It is important that you take the medication exactly as they are prescribed.   Keep your medication in the bottles provided by the pharmacist and keep a list of the medication names, dosages, and times to be taken in your wallet.   Do not take other medications without consulting your doctor.       NOTIFY YOUR PHYSICIAN FOR ANY OF THE FOLLOWING:   Fever over 101 degrees for 24 hours.   Chest pain, shortness of breath, fever, chills, nausea, vomiting, diarrhea, change in mentation, falling, weakness, bleeding. Severe pain or pain not relieved by medications.  Or, any other signs or symptoms that you may have questions about.      DISPOSITION:   x Home With:  x OT x PT x HH  RN       SNF/Inpatient Rehab/LTAC    Independent/assisted living    Hospice    Other:           Signed:   Alonzo Tatum MD  6/3/2024  3:21 PM

## 2024-06-03 NOTE — PROGRESS NOTES
Transition of Care: home with home health SN/PT/OT; [facility] accepted; info on the AVS  AND NEW DME- walker and BSC from [employer]; walker has been delivered to the patient at bedside on 6/3; BSC will be delivered to the patients home on 6/3    NOTE: family  requested personal care agency list- this resource given to them on 6/3    Transport Plan: in car with family    RUR: 12%    Main contacts are daughters- and son- Luciano Díaz- [phone]    Discharge pending:  -pending medical progress and care recommendations    1130: this CM met with patient st and her daughter and son at bedside; patients new walker was delivered to bedside on 6/3; updated them about HH with [facility] and gave them requested personal care agency list; also updated them about the BSC being delivered to their home; they verbalized understanding    Prior Level of Functioning: lives with daughters and grandson- needs help with adls  Disposition: home with home health- [facility]  If SNF or IPR: Date FOC offered: n/a  Date FOC received: n/a  Accepting facility: n/a  Date authorization started with reference number: n/a  Date authorization received and expires: n/a  Follow up appointments: specialists/pcp  DME needed: BSC and RW from [employer]  Transportation at discharge: in car with family  IM/IMM Medicare/ letter given: 2nd on 6/3/24  Is patient a [place] and connected with [facility]? no   If yes, was  transfer form completed and VA notified? N/a  Caregiver Contact: daughters/son  Discharge Caregiver contacted prior to discharge? Yes at bedside on 6/3  Care Conference needed? no  Barriers to discharge:  medical progress    CM following   Ruthie Schrader RN    NOTE: per previous CM note:  CM met with pt and her dgt Eileen Díaz who was at bedside. Pt's two daughters are LNOK and primary contacts. Dgt transports pt to appointments.   Lizett Lee [phone]  Eileen Díaz [phone]  Pt resides in her one story home with her dgt Eileen and

## 2024-06-04 ENCOUNTER — TELEPHONE (OUTPATIENT)
Age: 89
End: 2024-06-04

## 2024-06-04 NOTE — TELEPHONE ENCOUNTER
Care Transitions Initial Follow Up Call    Outreach made within 2 business days of discharge: Yes    Patient: Kassandra Díaz Patient : 1931   MRN: [id number]  Reason for Admission: UTI, Dementia, Failure to thrive  Discharge Date: 6/3/24       Attempted to contact patient daughter gary [place].    -Left message to return call.    Verena Gupta LPN

## 2024-06-05 ENCOUNTER — TELEPHONE (OUTPATIENT)
Age: 89
End: 2024-06-05

## 2024-06-05 NOTE — TELEPHONE ENCOUNTER
Care Transitions Initial Follow Up Call    Outreach made within 2 business days of discharge: Yes    Patient: Kassandra Díaz Patient : 1931   MRN: [id number]  Reason for Admission: UTI, dementia, failure to thrive  Discharge Date: 6/3/24       Spoke with: Attempted to contact patient daughter Lizett, gary.    -Left message to return call.    Discharge department/facility: [facility]    Scheduled appointment with PCP within 7-14 days    Follow Up  Future Appointments   Date Time Provider Department Center   2024 11:00 AM Theodora Angela MD [facility]       Verena Gupta LPN

## 2024-06-05 NOTE — TELEPHONE ENCOUNTER
Returned call to Maria Dolores/Charlee BARROSO for verbal order for PT and consult for speech therapy.

## 2024-06-05 NOTE — TELEPHONE ENCOUNTER
Reason for call:  TC from Maria Dolores, SRIKANTH, w/Charlee BARROSO. Maria Dolores calling to see if she could it a verbal order for PT for 9 weeks with pt and for a Speech Therapist consult due to pt pocketing her food. Maria Dolores stated she can be called with verbal order at phone number: [phone].        Is this a new problem: Yes    Date of last appointment:  3/2/2023     Can we respond via [facility]: No    Best call back number: Maria Dolores/SRIKANTH/Charlee BARROSO/Phone Number: [phone]

## 2024-06-11 NOTE — PROGRESS NOTES
Assessment/Plan:     1. Urinary tract infection without hematuria, site unspecified  -completed her course of ceftin yesterday.   -encouraged hydration with water    2. Moderate late onset Alzheimer's dementia without behavioral disturbance, psychotic disturbance, mood disturbance, or anxiety ([facility])  -MMS score was 15/30. Daughter has noted significant memory impairment.   -recommended initiation of aricept 5mg nightly    3. Benign hypertension  -elevated. Per ER note, her valsartan was held, but daughter reports that she has restarted this medication when she returned home.  -she is not sure of medication names and dosing.  Will schedule appointment for next week for medication reconciliation. She will bring all of her meds.     4. Acute renal disease  -encouraged hydration.   -will check renal function at next visit in 1 week.     Orders Placed This Encounter    donepezil (ARICEPT) 5 MG tablet     Sig: Take 1 tablet by mouth nightly     Dispense:  30 tablet     Refill:  0        Follow-up Disposition:   Return in about 1 week (around 6/21/2024).           Disclaimer:  Return if symptoms worsen or fail to improve.   Advised patient to call back or return to office if symptoms worsen/change/persist.     Discussed expected course/resolution/complications of diagnosis in detail with patient.   Medication risks/benefits/costs/interactions/alternatives discussed with patient.   Patient was given an after visit summary which includes diagnoses, current medications, & vitals.   Patient expressed understanding with the diagnosis and plan.        Subjective:      Kassandra Díaz is a 89 y.o. female who presents today for transition of care.    Admission date: 5/29/2024  Discharge date: 6/3/2024  Discharge Diagnosis: acute metabolic encephalopathy with Alzheimers dementa. UTI  Hospital: [facility]  Date of Navigator contact: 6/5/2024    Hospital Course:  -presented to ER with failure to thrive.  -noted to have UTI. Klebsiella on

## 2024-06-14 ENCOUNTER — OFFICE VISIT (OUTPATIENT)
Age: 89
End: 2024-06-14

## 2024-06-14 VITALS
DIASTOLIC BLOOD PRESSURE: 68 MMHG | WEIGHT: 96 LBS | HEART RATE: 59 BPM | SYSTOLIC BLOOD PRESSURE: 145 MMHG | RESPIRATION RATE: 16 BRPM | TEMPERATURE: 97 F | BODY MASS INDEX: 17.01 KG/M2 | HEIGHT: 63 IN | OXYGEN SATURATION: 99 %

## 2024-06-14 DIAGNOSIS — F02.B0 MODERATE LATE ONSET ALZHEIMER'S DEMENTIA WITHOUT BEHAVIORAL DISTURBANCE, PSYCHOTIC DISTURBANCE, MOOD DISTURBANCE, OR ANXIETY (HCC): Chronic | ICD-10-CM

## 2024-06-14 DIAGNOSIS — I10 BENIGN HYPERTENSION: ICD-10-CM

## 2024-06-14 DIAGNOSIS — N39.0 URINARY TRACT INFECTION WITHOUT HEMATURIA, SITE UNSPECIFIED: Primary | ICD-10-CM

## 2024-06-14 DIAGNOSIS — G30.1 MODERATE LATE ONSET ALZHEIMER'S DEMENTIA WITHOUT BEHAVIORAL DISTURBANCE, PSYCHOTIC DISTURBANCE, MOOD DISTURBANCE, OR ANXIETY (HCC): Chronic | ICD-10-CM

## 2024-06-14 DIAGNOSIS — N28.9 ACUTE RENAL DISEASE: ICD-10-CM

## 2024-06-14 RX ORDER — DONEPEZIL HYDROCHLORIDE 5 MG/1
5 TABLET, FILM COATED ORAL NIGHTLY
Qty: 30 TABLET | Refills: 0 | Status: SHIPPED | OUTPATIENT
Start: 2024-06-14

## 2024-06-14 SDOH — ECONOMIC STABILITY: HOUSING INSECURITY
IN THE LAST 12 MONTHS, WAS THERE A TIME WHEN YOU DID NOT HAVE A STEADY PLACE TO SLEEP OR SLEPT IN A SHELTER (INCLUDING NOW)?: NO

## 2024-06-14 SDOH — ECONOMIC STABILITY: INCOME INSECURITY: HOW HARD IS IT FOR YOU TO PAY FOR THE VERY BASICS LIKE FOOD, HOUSING, MEDICAL CARE, AND HEATING?: NOT HARD AT ALL

## 2024-06-14 SDOH — ECONOMIC STABILITY: FOOD INSECURITY: WITHIN THE PAST 12 MONTHS, YOU WORRIED THAT YOUR FOOD WOULD RUN OUT BEFORE YOU GOT MONEY TO BUY MORE.: NEVER TRUE

## 2024-06-14 SDOH — ECONOMIC STABILITY: FOOD INSECURITY: WITHIN THE PAST 12 MONTHS, THE FOOD YOU BOUGHT JUST DIDN'T LAST AND YOU DIDN'T HAVE MONEY TO GET MORE.: NEVER TRUE

## 2024-06-14 ASSESSMENT — MINI MENTAL STATE EXAM
SUM ALL MMSE QUESTIONS FOR TOTAL SCORE [OUT OF 30].: 15
SHOW: WRISTWATCH [OBJECT] ASK: WHAT IS THIS CALLED?: 1
WHAT DAY OF THE WEEK IS THIS?: 0
SAY: PUT THE PAPER DOWN ON THE FLOOR, SCORE IF PAPER IS PLACED BACK ON FLOOR: 1
WHICH SEASON IS THIS?: 0
SAY: I WOULD LIKE YOU TO COUNT BACKWARD FROM 100 BY SEVENS: 0
WHAT STATE [OR PROVINCE] ARE WE IN?: 1
PLACE DESIGN, ERASER AND PENCIL IN FRONT OF THE PERSON.  SAY:  COPY THIS DESIGN PLEASE.  SHOW: DESIGN. ALLOW: MULTIPLE TRIES. WAIT UNTIL PERSON IS FINISHED AND HANDS IT BACK. SCORE: ONLY FOR DIAGRAM WITH 4-SIDED FIGURE BETWEEN TWO 5-SIDED FIGURES: 0
SAY: FOLD THE PAPER IN HALF ONCE WITH BOTH HANDS, SCORE IF PAPER IS CORRECTLY FOLDED IN HALF.: 1
WHAT IS THE NAME OF THIS BUILDING [IN FACILITY]?/WHAT IS THE STREET ADDRESS OF THIS HOUSE [IN HOME]?: 0
HAND THE PERSON A PENCIL AND PAPER. SAY: WRITE ANY COMPLETE SENTENCE ON THAT
PIECE OF PAPER. (NOTE: THE SENTENCE MUST MAKE SENSE. IGNORE SPELLING ERRORS): 1
SAY: I WOULD LIKE YOU TO REPEAT THIS PHRASE AFTER ME: NO IFS, ANDS, OR BUTS.: 1
WHAT MONTH IS THIS?: 1
WHAT IS TODAY'S DATE?: 0
SAY: READ THE WORDS ON THE PAGE AND THEN DO WHAT IT SAYS. THEN HAND THE PERSON
THE SHEET WITH CLOSE YOUR EYES ON IT. IF THE SUBJECT READS AND DOES NOT CLOSE THEIR EYES, REPEAT UP TO THREE TIMES. SCORE ONLY IF SUBJECT CLOSES EYES.: 1
ASK THE PERSON IF HE IS RIGHT OR LEFT-HANDED. TAKE A PIECE OF PAPER AND HOLD IT UP IN
FRONT OF THE PERSON. SAY: TAKE THIS PAPER IN YOUR RIGHT/LEFT HAND (WHICHEVER IS NON-
DOMINANT), SCORE IF PAPER IS PICKED UP IN CORRECT HAND.: 1
WHAT YEAR IS THIS?: 0
SAY: I AM GOING TO NAME THREE OBJECTS. WHEN I AM FINISHED, I WANT YOU TO REPEAT
THEM. REMEMBER WHAT THEY ARE BECAUSE I AM GOING TO ASK YOU TO NAME THEM AGAIN IN
A FEW MINUTES.  SAY THE FOLLOWING WORDS SLOWLY AT 1-SECOND INTERVALS - BALL/ CAR/ MAN [ITERATIONS FOR REPEAT ADMINISTRATION]: 3
WHAT FLOOR ARE WE ON [IN FACILITY]?/ WHAT ROOM ARE WE IN [IN HOME]?: 0
NOW WHAT WERE THE THREE OBJECTS I ASKED YOU TO REMEMBER?: 0
SHOW: PENCIL [OBJECT] ASK: WHAT IS THIS CALLED?: 1
WHAT COUNTRY ARE WE IN?: 1
WHAT CITY/TOWN ARE WE IN?: 1

## 2024-06-14 ASSESSMENT — PATIENT HEALTH QUESTIONNAIRE - PHQ9
SUM OF ALL RESPONSES TO PHQ QUESTIONS 1-9: 0
1. LITTLE INTEREST OR PLEASURE IN DOING THINGS: NOT AT ALL
2. FEELING DOWN, DEPRESSED OR HOPELESS: NOT AT ALL
SUM OF ALL RESPONSES TO PHQ QUESTIONS 1-9: 0
SUM OF ALL RESPONSES TO PHQ QUESTIONS 1-9: 0
SUM OF ALL RESPONSES TO PHQ9 QUESTIONS 1 & 2: 0
SUM OF ALL RESPONSES TO PHQ QUESTIONS 1-9: 0

## 2024-06-14 NOTE — PATIENT INSTRUCTIONS
Hydrate with water - 1 big water bottle - about 30 ounces daily.   Start aricept 5mg one tablet at bedtime  Bring in all of your medications to your next visit.

## 2024-06-15 NOTE — PROGRESS NOTES
Physician Progress Note      PATIENT:               WING RODRÍGUEZ  CSN #:                  [id number]  :                       1931  ADMIT DATE:       2024 9:12 PM  DISCH DATE:        6/3/2024 6:56 PM  RESPONDING  PROVIDER #:        Alonzo Luu MD          QUERY TEXT:    Pt admitted with uti and has malnutrition documented in 6/3 RD pn. Please   further specify type of malnutrition with documentation in the medical record.    The medical record reflects the following:  Risk Factors:dementia  Clinical Indicators: 6/3 RDpn-severe malnutrition (24 1319)  Context:  Social/Environmental Circumstances  Findings of the 6 clinical characteristics of malnutrition:  Energy Intake:  Mild decrease in energy intake (Comment) (poor   appetite/intakes x 2 wks per pt/family report)  Weight Loss:  Unable to assess (differring time-frame of wt loss per pt/family   report and wt hx per EMR. Suspect wt loss however, RD unable to objectively   determine with given data)  Body Fat Loss:  Severe body fat loss Orbital, Triceps, Buccal region  Muscle Mass Loss:  Severe muscle mass loss Thigh (quadraceps), Clavicles   (pectoralis & deltoids), Temples (temporalis), Hand (interosseous)  Fluid Accumulation:  No significant fluid accumulation  Treatment: RD cx, supplements, I&Os, ?appetite stimulant  Thanks  Lola Link RN/SYEDA [id number]  ASPEN Criteria:    https://aspenjournals.onlinelibrary.loya.com/doi/full/10.1177/261433785136845  5  Options provided:  -- Severe Malnutrition  -- Severe Protein calorie malnutrition  -- Other - I will add my own diagnosis  -- Disagree - Not applicable / Not valid  -- Disagree - Clinically unable to determine / Unknown  -- Refer to Clinical Documentation Reviewer    PROVIDER RESPONSE TEXT:    This patient has severe malnutrition.    Query created by: Lola Link on 6/3/2024 3:04 PM      Electronically signed by:  Alonzo Luu MD 6/15/2024 10:31 AM

## 2024-06-28 PROBLEM — N39.0 UTI (URINARY TRACT INFECTION): Status: RESOLVED | Noted: 2024-05-29 | Resolved: 2024-06-28

## 2024-08-20 ENCOUNTER — HOSPITAL ENCOUNTER (OUTPATIENT)
Facility: HOSPITAL | Age: 89
Discharge: HOME | End: 2024-08-23

## 2024-08-20 DIAGNOSIS — I35.0 AORTIC VALVE STENOSIS, ETIOLOGY OF CARDIAC VALVE DISEASE UNSPECIFIED: ICD-10-CM

## 2024-08-20 PROCEDURE — 75571 CT HRT W/O DYE W/CA TEST: CPT

## 2025-03-14 ENCOUNTER — APPOINTMENT (OUTPATIENT)
Facility: HOSPITAL | Age: 89
DRG: 683 | End: 2025-03-14
Payer: MEDICARE

## 2025-03-14 ENCOUNTER — HOSPITAL ENCOUNTER (INPATIENT)
Facility: HOSPITAL | Age: 89
LOS: 3 days | Discharge: HOSPICE | DRG: 683 | End: 2025-03-19
Attending: EMERGENCY MEDICINE | Admitting: HOSPITALIST
Payer: MEDICARE

## 2025-03-14 DIAGNOSIS — R53.1 GENERALIZED WEAKNESS: Primary | ICD-10-CM

## 2025-03-14 DIAGNOSIS — E87.6 HYPOKALEMIA: ICD-10-CM

## 2025-03-14 LAB
ALBUMIN SERPL-MCNC: 3.4 G/DL (ref 3.5–5)
ALBUMIN/GLOB SERPL: 0.8 (ref 1.1–2.2)
ALP SERPL-CCNC: 74 U/L (ref 45–117)
ALT SERPL-CCNC: 20 U/L (ref 12–78)
ANION GAP SERPL CALC-SCNC: 6 MMOL/L (ref 2–12)
APPEARANCE UR: ABNORMAL
AST SERPL-CCNC: 28 U/L (ref 15–37)
BACTERIA URNS QL MICRO: ABNORMAL /HPF
BILIRUB DIRECT SERPL-MCNC: 0.2 MG/DL (ref 0–0.2)
BILIRUB SERPL-MCNC: 0.7 MG/DL (ref 0.2–1)
BILIRUB UR QL: NEGATIVE
BUN SERPL-MCNC: 34 MG/DL (ref 6–20)
BUN/CREAT SERPL: 14 (ref 12–20)
CALCIUM SERPL-MCNC: 12.1 MG/DL (ref 8.5–10.1)
CHLORIDE SERPL-SCNC: 99 MMOL/L (ref 97–108)
CO2 SERPL-SCNC: 35 MMOL/L (ref 21–32)
COLOR UR: ABNORMAL
COMMENT:: NORMAL
CREAT SERPL-MCNC: 2.36 MG/DL (ref 0.55–1.02)
EKG ATRIAL RATE: 56 BPM
EKG DIAGNOSIS: NORMAL
EKG P AXIS: 94 DEGREES
EKG P-R INTERVAL: 162 MS
EKG Q-T INTERVAL: 544 MS
EKG QRS DURATION: 112 MS
EKG QTC CALCULATION (BAZETT): 524 MS
EKG R AXIS: -24 DEGREES
EKG T AXIS: 97 DEGREES
EKG VENTRICULAR RATE: 56 BPM
EPITH CASTS URNS QL MICRO: ABNORMAL /LPF
ERYTHROCYTE [DISTWIDTH] IN BLOOD BY AUTOMATED COUNT: 13.4 % (ref 11.5–14.5)
GLOBULIN SER CALC-MCNC: 4.3 G/DL (ref 2–4)
GLUCOSE SERPL-MCNC: 229 MG/DL (ref 65–100)
GLUCOSE UR STRIP.AUTO-MCNC: NEGATIVE MG/DL
HCT VFR BLD AUTO: 30.8 % (ref 35–47)
HGB BLD-MCNC: 10.9 G/DL (ref 11.5–16)
HGB UR QL STRIP: ABNORMAL
KETONES UR QL STRIP.AUTO: NEGATIVE MG/DL
LEUKOCYTE ESTERASE UR QL STRIP.AUTO: NEGATIVE
MCH RBC QN AUTO: 32.2 PG (ref 26–34)
MCHC RBC AUTO-ENTMCNC: 35.4 G/DL (ref 30–36.5)
MCV RBC AUTO: 91.1 FL (ref 80–99)
NITRITE UR QL STRIP.AUTO: NEGATIVE
NRBC # BLD: 0 K/UL (ref 0–0.01)
NRBC BLD-RTO: 0 PER 100 WBC
PH UR STRIP: 6 (ref 5–8)
PLATELET # BLD AUTO: 201 K/UL (ref 150–400)
PMV BLD AUTO: 10.4 FL (ref 8.9–12.9)
POTASSIUM SERPL-SCNC: 2 MMOL/L (ref 3.5–5.1)
PROT SERPL-MCNC: 7.7 G/DL (ref 6.4–8.2)
PROT UR STRIP-MCNC: 300 MG/DL
RBC # BLD AUTO: 3.38 M/UL (ref 3.8–5.2)
RBC #/AREA URNS HPF: ABNORMAL /HPF (ref 0–5)
SODIUM SERPL-SCNC: 140 MMOL/L (ref 136–145)
SP GR UR REFRACTOMETRY: 1.02 (ref 1–1.03)
SPECIMEN HOLD: NORMAL
SPECIMEN HOLD: NORMAL
TROPONIN I SERPL HS-MCNC: 43 NG/L (ref 0–51)
UROBILINOGEN UR QL STRIP.AUTO: 1 EU/DL (ref 0.2–1)
WBC # BLD AUTO: 6.4 K/UL (ref 3.6–11)
WBC URNS QL MICRO: ABNORMAL /HPF (ref 0–4)

## 2025-03-14 PROCEDURE — 96365 THER/PROPH/DIAG IV INF INIT: CPT

## 2025-03-14 PROCEDURE — 2580000003 HC RX 258: Performed by: EMERGENCY MEDICINE

## 2025-03-14 PROCEDURE — G0378 HOSPITAL OBSERVATION PER HR: HCPCS

## 2025-03-14 PROCEDURE — 84484 ASSAY OF TROPONIN QUANT: CPT

## 2025-03-14 PROCEDURE — 6370000000 HC RX 637 (ALT 250 FOR IP): Performed by: INTERNAL MEDICINE

## 2025-03-14 PROCEDURE — 80048 BASIC METABOLIC PNL TOTAL CA: CPT

## 2025-03-14 PROCEDURE — 6360000002 HC RX W HCPCS: Performed by: EMERGENCY MEDICINE

## 2025-03-14 PROCEDURE — 2500000003 HC RX 250 WO HCPCS: Performed by: INTERNAL MEDICINE

## 2025-03-14 PROCEDURE — 81001 URINALYSIS AUTO W/SCOPE: CPT

## 2025-03-14 PROCEDURE — 80076 HEPATIC FUNCTION PANEL: CPT

## 2025-03-14 PROCEDURE — 93005 ELECTROCARDIOGRAM TRACING: CPT | Performed by: EMERGENCY MEDICINE

## 2025-03-14 PROCEDURE — 85027 COMPLETE CBC AUTOMATED: CPT

## 2025-03-14 PROCEDURE — 71045 X-RAY EXAM CHEST 1 VIEW: CPT

## 2025-03-14 PROCEDURE — 99285 EMERGENCY DEPT VISIT HI MDM: CPT

## 2025-03-14 RX ORDER — VALSARTAN 160 MG/1
160 TABLET ORAL DAILY
Status: DISCONTINUED | OUTPATIENT
Start: 2025-03-15 | End: 2025-03-19 | Stop reason: HOSPADM

## 2025-03-14 RX ORDER — ONDANSETRON 2 MG/ML
4 INJECTION INTRAMUSCULAR; INTRAVENOUS EVERY 6 HOURS PRN
Status: DISCONTINUED | OUTPATIENT
Start: 2025-03-14 | End: 2025-03-19 | Stop reason: HOSPADM

## 2025-03-14 RX ORDER — AMLODIPINE BESYLATE 5 MG/1
10 TABLET ORAL DAILY
Status: DISCONTINUED | OUTPATIENT
Start: 2025-03-15 | End: 2025-03-19 | Stop reason: HOSPADM

## 2025-03-14 RX ORDER — ACETAMINOPHEN 650 MG/1
650 SUPPOSITORY RECTAL EVERY 6 HOURS PRN
Status: DISCONTINUED | OUTPATIENT
Start: 2025-03-14 | End: 2025-03-19 | Stop reason: HOSPADM

## 2025-03-14 RX ORDER — POTASSIUM CHLORIDE 750 MG/1
40 TABLET, EXTENDED RELEASE ORAL PRN
Status: DISCONTINUED | OUTPATIENT
Start: 2025-03-14 | End: 2025-03-15

## 2025-03-14 RX ORDER — METOPROLOL TARTRATE 50 MG
50 TABLET ORAL 2 TIMES DAILY
Status: DISCONTINUED | OUTPATIENT
Start: 2025-03-14 | End: 2025-03-15

## 2025-03-14 RX ORDER — DONEPEZIL HYDROCHLORIDE 5 MG/1
5 TABLET, FILM COATED ORAL NIGHTLY
Status: DISCONTINUED | OUTPATIENT
Start: 2025-03-14 | End: 2025-03-19 | Stop reason: HOSPADM

## 2025-03-14 RX ORDER — EZETIMIBE 10 MG/1
10 TABLET ORAL DAILY
Status: DISCONTINUED | OUTPATIENT
Start: 2025-03-15 | End: 2025-03-19 | Stop reason: HOSPADM

## 2025-03-14 RX ORDER — POTASSIUM CHLORIDE 7.45 MG/ML
10 INJECTION INTRAVENOUS PRN
Status: DISCONTINUED | OUTPATIENT
Start: 2025-03-14 | End: 2025-03-15

## 2025-03-14 RX ORDER — POLYETHYLENE GLYCOL 3350 17 G/17G
17 POWDER, FOR SOLUTION ORAL DAILY PRN
Status: DISCONTINUED | OUTPATIENT
Start: 2025-03-14 | End: 2025-03-19 | Stop reason: HOSPADM

## 2025-03-14 RX ORDER — SODIUM CHLORIDE 9 MG/ML
INJECTION, SOLUTION INTRAVENOUS PRN
Status: DISCONTINUED | OUTPATIENT
Start: 2025-03-14 | End: 2025-03-19 | Stop reason: HOSPADM

## 2025-03-14 RX ORDER — 0.9 % SODIUM CHLORIDE 0.9 %
500 INTRAVENOUS SOLUTION INTRAVENOUS ONCE
Status: COMPLETED | OUTPATIENT
Start: 2025-03-14 | End: 2025-03-14

## 2025-03-14 RX ORDER — ACETAMINOPHEN 325 MG/1
650 TABLET ORAL EVERY 6 HOURS PRN
Status: DISCONTINUED | OUTPATIENT
Start: 2025-03-14 | End: 2025-03-19 | Stop reason: HOSPADM

## 2025-03-14 RX ORDER — POTASSIUM CHLORIDE 7.45 MG/ML
10 INJECTION INTRAVENOUS ONCE
Status: COMPLETED | OUTPATIENT
Start: 2025-03-14 | End: 2025-03-14

## 2025-03-14 RX ORDER — SODIUM CHLORIDE 0.9 % (FLUSH) 0.9 %
5-40 SYRINGE (ML) INJECTION PRN
Status: DISCONTINUED | OUTPATIENT
Start: 2025-03-14 | End: 2025-03-19 | Stop reason: HOSPADM

## 2025-03-14 RX ORDER — LANOLIN ALCOHOL/MO/W.PET/CERES
100 CREAM (GRAM) TOPICAL DAILY
Status: DISCONTINUED | OUTPATIENT
Start: 2025-03-15 | End: 2025-03-19 | Stop reason: HOSPADM

## 2025-03-14 RX ORDER — ROSUVASTATIN CALCIUM 10 MG/1
20 TABLET, COATED ORAL
Status: CANCELLED | OUTPATIENT
Start: 2025-03-14

## 2025-03-14 RX ORDER — SODIUM CHLORIDE 0.9 % (FLUSH) 0.9 %
5-40 SYRINGE (ML) INJECTION EVERY 12 HOURS SCHEDULED
Status: DISCONTINUED | OUTPATIENT
Start: 2025-03-14 | End: 2025-03-19 | Stop reason: HOSPADM

## 2025-03-14 RX ORDER — HEPARIN SODIUM 5000 [USP'U]/ML
5000 INJECTION, SOLUTION INTRAVENOUS; SUBCUTANEOUS 2 TIMES DAILY
Status: DISCONTINUED | OUTPATIENT
Start: 2025-03-15 | End: 2025-03-19 | Stop reason: HOSPADM

## 2025-03-14 RX ORDER — MAGNESIUM SULFATE IN WATER 40 MG/ML
2000 INJECTION, SOLUTION INTRAVENOUS PRN
Status: DISCONTINUED | OUTPATIENT
Start: 2025-03-14 | End: 2025-03-19 | Stop reason: HOSPADM

## 2025-03-14 RX ORDER — ENOXAPARIN SODIUM 100 MG/ML
40 INJECTION SUBCUTANEOUS DAILY
Status: DISCONTINUED | OUTPATIENT
Start: 2025-03-14 | End: 2025-03-14 | Stop reason: SDUPTHER

## 2025-03-14 RX ORDER — ASPIRIN 81 MG/1
81 TABLET ORAL DAILY
Status: DISCONTINUED | OUTPATIENT
Start: 2025-03-15 | End: 2025-03-19 | Stop reason: HOSPADM

## 2025-03-14 RX ORDER — ONDANSETRON 4 MG/1
4 TABLET, ORALLY DISINTEGRATING ORAL EVERY 8 HOURS PRN
Status: DISCONTINUED | OUTPATIENT
Start: 2025-03-14 | End: 2025-03-19 | Stop reason: HOSPADM

## 2025-03-14 RX ORDER — DEXTROSE MONOHYDRATE, SODIUM CHLORIDE, AND POTASSIUM CHLORIDE 50; 1.49; 4.5 G/1000ML; G/1000ML; G/1000ML
INJECTION, SOLUTION INTRAVENOUS CONTINUOUS
Status: DISCONTINUED | OUTPATIENT
Start: 2025-03-14 | End: 2025-03-15

## 2025-03-14 RX ADMIN — DONEPEZIL HYDROCHLORIDE 5 MG: 10 TABLET, FILM COATED ORAL at 23:33

## 2025-03-14 RX ADMIN — POTASSIUM CHLORIDE 10 MEQ: 7.46 INJECTION, SOLUTION INTRAVENOUS at 18:47

## 2025-03-14 RX ADMIN — POTASSIUM CHLORIDE 10 MEQ: 7.46 INJECTION, SOLUTION INTRAVENOUS at 20:00

## 2025-03-14 RX ADMIN — DEXTROSE MONOHYDRATE, SODIUM CHLORIDE, AND POTASSIUM CHLORIDE: 50; 4.5; 1.49 INJECTION, SOLUTION INTRAVENOUS at 21:37

## 2025-03-14 RX ADMIN — SODIUM CHLORIDE 500 ML: 0.9 INJECTION, SOLUTION INTRAVENOUS at 18:46

## 2025-03-14 RX ADMIN — SODIUM CHLORIDE, PRESERVATIVE FREE 10 ML: 5 INJECTION INTRAVENOUS at 21:33

## 2025-03-14 ASSESSMENT — PAIN SCALES - GENERAL
PAINLEVEL_OUTOF10: 0

## 2025-03-14 ASSESSMENT — LIFESTYLE VARIABLES
HOW MANY STANDARD DRINKS CONTAINING ALCOHOL DO YOU HAVE ON A TYPICAL DAY: PATIENT DOES NOT DRINK
HOW OFTEN DO YOU HAVE A DRINK CONTAINING ALCOHOL: NEVER

## 2025-03-14 ASSESSMENT — PAIN - FUNCTIONAL ASSESSMENT: PAIN_FUNCTIONAL_ASSESSMENT: 0-10

## 2025-03-14 NOTE — H&P
current medication regimen.  No changes made.  Outpatient follow-up with the primary care physician suggested.    9.  Coronary artery disease/status post CABG remote past/stable    10.  History of stent placement in the carotid artery.    CODE STATUS: Patient has a living will stating DNR.  Patient's grandson is the power of .    DIET: ADULT DIET; Regular   ISOLATION PRECAUTIONS: No active isolations  CODE STATUS: [unfilled]   DVT PROPHYLAXIS: Lovenox  Central Line:     FUNCTIONAL STATUS PRIOR TO HOSPITALIZATION: Ambulatory and capable of self-care but relies on assistive devices (rolling walker/cane).   EARLY MOBILITY ASSESSMENT: Recommend a consult to the Mobility Team  ANTICIPATED DISCHARGE: 24-48 hours.  EMERGENCY CONTACT/SURROGATE DECISION MAKER: Grandson    CRITICAL CARE WAS PERFORMED FOR THIS ENCOUNTER: NO.      Signed By: Royce Hodge MD     March 14, 2025         Please note that this dictation may have been completed with Dragon, the [employer] voice recognition software.  Quite often unanticipated grammatical, syntax, homophones, and other interpretive errors are inadvertently transcribed by the computer software.  Please disregard these errors.  Please excuse any errors that have escaped final proofreading.

## 2025-03-14 NOTE — ED TRIAGE NOTES
Patient arrives to the ED by EMS for complaints of weakness, frequent urination, and failure to thrive. Patient has an appointment with her PCP on Wednesday but patient's son believes she is unable to wait until them.     Hx dementia, .

## 2025-03-14 NOTE — ED PROVIDER NOTES
[facility] EMERGENCY DEPARTMENT  EMERGENCY DEPARTMENT ENCOUNTER      Pt Name: Kassandra Díaz  MRN: [id number]  Birthdate 6/12/1931  Date of evaluation: 3/14/2025  Provider: Martin Grewal MD    CHIEF COMPLAINT       Chief Complaint   Patient presents with    Fatigue         HISTORY OF PRESENT ILLNESS    This is a 89-year-old female who has a history of coronary artery disease, stents, hypertension and CABG.  According to the family, over the last week she has been getting weaker and weaker.  She has a stepstool by the bed and she has not been able over the last week to few days to even stand up on the stoop to get into her bed.  She has been sleeping slouched over in her chair in her room.  She is not eating as much, she is not drinking as much, she has not been running any fever or having any chest pain or shortness of breath.  She denies any nausea or vomiting and no bowel changes.  Patient is having no other acute complaint.  Family states the last time she did that she had a UTI.  None of her medications have changed.  Patient voices no other acute complaint.        [facility] Consult for Admission  6:09 PM    ED Room Number: ER25/25  Patient Name and age:  Kassandra Díaz 89 y.o.  female  Working Diagnosis:   1. Generalized weakness    2. Hypokalemia        COVID-19 Suspicion: No  Sepsis present:  No  Reassessment needed: No  Code Status:  Full Code  Readmission: No  Isolation Requirements: no  Recommended Level of Care: telemetry  Department: [facility] Adult ED - [phone]  Consulting Provider:         Review of External Medical Records:     Nursing Notes were reviewed.    REVIEW OF SYSTEMS       Review of Systems   Constitutional:  Positive for fatigue. Negative for activity change, chills and fever.   HENT:  Negative for congestion, ear pain, rhinorrhea, sinus pressure, sinus pain, sore throat and trouble swallowing.    Eyes: Negative.    Respiratory:  Negative for cough, chest tightness, shortness of

## 2025-03-15 LAB
ANION GAP SERPL CALC-SCNC: 12 MMOL/L (ref 2–12)
ANION GAP SERPL CALC-SCNC: 4 MMOL/L (ref 2–12)
ANION GAP SERPL CALC-SCNC: 8 MMOL/L (ref 2–12)
BASOPHILS # BLD: 0.02 K/UL (ref 0–0.1)
BASOPHILS NFR BLD: 0.3 % (ref 0–1)
BUN SERPL-MCNC: 21 MG/DL (ref 6–20)
BUN SERPL-MCNC: 22 MG/DL (ref 6–20)
BUN SERPL-MCNC: 32 MG/DL (ref 6–20)
BUN/CREAT SERPL: 13 (ref 12–20)
BUN/CREAT SERPL: 13 (ref 12–20)
BUN/CREAT SERPL: 15 (ref 12–20)
CALCIUM SERPL-MCNC: 11 MG/DL (ref 8.5–10.1)
CALCIUM SERPL-MCNC: 11 MG/DL (ref 8.5–10.1)
CALCIUM SERPL-MCNC: 11.1 MG/DL (ref 8.5–10.1)
CALCIUM SERPL-MCNC: 11.1 MG/DL (ref 8.5–10.1)
CHLORIDE SERPL-SCNC: 103 MMOL/L (ref 97–108)
CHLORIDE SERPL-SCNC: 103 MMOL/L (ref 97–108)
CHLORIDE SERPL-SCNC: 104 MMOL/L (ref 97–108)
CO2 SERPL-SCNC: 22 MMOL/L (ref 21–32)
CO2 SERPL-SCNC: 30 MMOL/L (ref 21–32)
CO2 SERPL-SCNC: 30 MMOL/L (ref 21–32)
CREAT SERPL-MCNC: 1.62 MG/DL (ref 0.55–1.02)
CREAT SERPL-MCNC: 1.64 MG/DL (ref 0.55–1.02)
CREAT SERPL-MCNC: 2.1 MG/DL (ref 0.55–1.02)
DIFFERENTIAL METHOD BLD: ABNORMAL
EOSINOPHIL # BLD: 0.04 K/UL (ref 0–0.4)
EOSINOPHIL NFR BLD: 0.6 % (ref 0–7)
ERYTHROCYTE [DISTWIDTH] IN BLOOD BY AUTOMATED COUNT: 13.3 % (ref 11.5–14.5)
GLUCOSE BLD STRIP.AUTO-MCNC: 116 MG/DL (ref 65–117)
GLUCOSE BLD STRIP.AUTO-MCNC: 119 MG/DL (ref 65–117)
GLUCOSE BLD STRIP.AUTO-MCNC: 124 MG/DL (ref 65–117)
GLUCOSE BLD STRIP.AUTO-MCNC: 179 MG/DL (ref 65–117)
GLUCOSE BLD STRIP.AUTO-MCNC: 88 MG/DL (ref 65–117)
GLUCOSE SERPL-MCNC: 204 MG/DL (ref 65–100)
GLUCOSE SERPL-MCNC: 91 MG/DL (ref 65–100)
GLUCOSE SERPL-MCNC: 91 MG/DL (ref 65–100)
HCT VFR BLD AUTO: 28.2 % (ref 35–47)
HGB BLD-MCNC: 9.7 G/DL (ref 11.5–16)
IMM GRANULOCYTES # BLD AUTO: 0.02 K/UL (ref 0–0.04)
IMM GRANULOCYTES NFR BLD AUTO: 0.3 % (ref 0–0.5)
LYMPHOCYTES # BLD: 1.45 K/UL (ref 0.8–3.5)
LYMPHOCYTES NFR BLD: 19.9 % (ref 12–49)
MAGNESIUM SERPL-MCNC: 2 MG/DL (ref 1.6–2.4)
MCH RBC QN AUTO: 31 PG (ref 26–34)
MCHC RBC AUTO-ENTMCNC: 34.4 G/DL (ref 30–36.5)
MCV RBC AUTO: 90.1 FL (ref 80–99)
MONOCYTES # BLD: 0.49 K/UL (ref 0–1)
MONOCYTES NFR BLD: 6.7 % (ref 5–13)
NEUTS SEG # BLD: 5.25 K/UL (ref 1.8–8)
NEUTS SEG NFR BLD: 72.2 % (ref 32–75)
NRBC # BLD: 0 K/UL (ref 0–0.01)
NRBC BLD-RTO: 0 PER 100 WBC
PLATELET # BLD AUTO: 153 K/UL (ref 150–400)
PMV BLD AUTO: 10.2 FL (ref 8.9–12.9)
POTASSIUM SERPL-SCNC: 2 MMOL/L (ref 3.5–5.1)
POTASSIUM SERPL-SCNC: 2.8 MMOL/L (ref 3.5–5.1)
POTASSIUM SERPL-SCNC: 3.3 MMOL/L (ref 3.5–5.1)
POTASSIUM UR-SCNC: 40 MMOL/L
PTH-INTACT SERPL-MCNC: 301 PG/ML (ref 18.4–88)
RBC # BLD AUTO: 3.13 M/UL (ref 3.8–5.2)
SERVICE CMNT-IMP: ABNORMAL
SERVICE CMNT-IMP: NORMAL
SERVICE CMNT-IMP: NORMAL
SODIUM SERPL-SCNC: 137 MMOL/L (ref 136–145)
SODIUM SERPL-SCNC: 138 MMOL/L (ref 136–145)
SODIUM SERPL-SCNC: 141 MMOL/L (ref 136–145)
TSH SERPL DL<=0.05 MIU/L-ACNC: 3.56 UIU/ML (ref 0.36–3.74)
WBC # BLD AUTO: 7.3 K/UL (ref 3.6–11)

## 2025-03-15 PROCEDURE — 6360000002 HC RX W HCPCS: Performed by: INTERNAL MEDICINE

## 2025-03-15 PROCEDURE — 6360000002 HC RX W HCPCS

## 2025-03-15 PROCEDURE — 84133 ASSAY OF URINE POTASSIUM: CPT

## 2025-03-15 PROCEDURE — 6360000002 HC RX W HCPCS: Performed by: HOSPITALIST

## 2025-03-15 PROCEDURE — G0378 HOSPITAL OBSERVATION PER HR: HCPCS

## 2025-03-15 PROCEDURE — 85025 COMPLETE CBC W/AUTO DIFF WBC: CPT

## 2025-03-15 PROCEDURE — 2580000003 HC RX 258: Performed by: HOSPITALIST

## 2025-03-15 PROCEDURE — 2500000003 HC RX 250 WO HCPCS: Performed by: INTERNAL MEDICINE

## 2025-03-15 PROCEDURE — 83970 ASSAY OF PARATHORMONE: CPT

## 2025-03-15 PROCEDURE — 82962 GLUCOSE BLOOD TEST: CPT

## 2025-03-15 PROCEDURE — 6370000000 HC RX 637 (ALT 250 FOR IP): Performed by: HOSPITALIST

## 2025-03-15 PROCEDURE — 80048 BASIC METABOLIC PNL TOTAL CA: CPT

## 2025-03-15 PROCEDURE — 6370000000 HC RX 637 (ALT 250 FOR IP): Performed by: INTERNAL MEDICINE

## 2025-03-15 PROCEDURE — 84443 ASSAY THYROID STIM HORMONE: CPT

## 2025-03-15 PROCEDURE — 83735 ASSAY OF MAGNESIUM: CPT

## 2025-03-15 RX ORDER — METOPROLOL TARTRATE 25 MG/1
25 TABLET, FILM COATED ORAL 2 TIMES DAILY
Status: DISCONTINUED | OUTPATIENT
Start: 2025-03-15 | End: 2025-03-19 | Stop reason: HOSPADM

## 2025-03-15 RX ORDER — DEXTROSE MONOHYDRATE 100 MG/ML
INJECTION, SOLUTION INTRAVENOUS CONTINUOUS PRN
Status: DISCONTINUED | OUTPATIENT
Start: 2025-03-15 | End: 2025-03-19 | Stop reason: HOSPADM

## 2025-03-15 RX ORDER — DEXTROSE MONOHYDRATE 100 MG/ML
INJECTION, SOLUTION INTRAVENOUS CONTINUOUS PRN
Status: DISCONTINUED | OUTPATIENT
Start: 2025-03-15 | End: 2025-03-16 | Stop reason: SDUPTHER

## 2025-03-15 RX ORDER — SODIUM CHLORIDE AND POTASSIUM CHLORIDE 150; 900 MG/100ML; MG/100ML
INJECTION, SOLUTION INTRAVENOUS CONTINUOUS
Status: DISCONTINUED | OUTPATIENT
Start: 2025-03-15 | End: 2025-03-15 | Stop reason: SDUPTHER

## 2025-03-15 RX ORDER — POTASSIUM CHLORIDE 750 MG/1
40 TABLET, EXTENDED RELEASE ORAL ONCE
Status: COMPLETED | OUTPATIENT
Start: 2025-03-15 | End: 2025-03-15

## 2025-03-15 RX ORDER — POTASSIUM CHLORIDE 7.45 MG/ML
10 INJECTION INTRAVENOUS ONCE
Status: COMPLETED | OUTPATIENT
Start: 2025-03-15 | End: 2025-03-15

## 2025-03-15 RX ORDER — POTASSIUM CHLORIDE 750 MG/1
20 TABLET, EXTENDED RELEASE ORAL 3 TIMES DAILY
Status: COMPLETED | OUTPATIENT
Start: 2025-03-15 | End: 2025-03-15

## 2025-03-15 RX ORDER — INSULIN LISPRO 100 [IU]/ML
0-4 INJECTION, SOLUTION INTRAVENOUS; SUBCUTANEOUS
Status: DISCONTINUED | OUTPATIENT
Start: 2025-03-15 | End: 2025-03-19 | Stop reason: HOSPADM

## 2025-03-15 RX ADMIN — SODIUM CHLORIDE, PRESERVATIVE FREE 10 ML: 5 INJECTION INTRAVENOUS at 09:12

## 2025-03-15 RX ADMIN — DEXTROSE MONOHYDRATE, SODIUM CHLORIDE, AND POTASSIUM CHLORIDE: 50; 4.5; 1.49 INJECTION, SOLUTION INTRAVENOUS at 11:03

## 2025-03-15 RX ADMIN — METOPROLOL TARTRATE 25 MG: 25 TABLET, FILM COATED ORAL at 21:36

## 2025-03-15 RX ADMIN — POTASSIUM CHLORIDE 40 MEQ: 750 TABLET, FILM COATED, EXTENDED RELEASE ORAL at 17:26

## 2025-03-15 RX ADMIN — METOPROLOL TARTRATE 50 MG: 50 TABLET, FILM COATED ORAL at 09:11

## 2025-03-15 RX ADMIN — VALSARTAN 160 MG: 160 TABLET, FILM COATED ORAL at 09:11

## 2025-03-15 RX ADMIN — Medication 100 MG: at 09:11

## 2025-03-15 RX ADMIN — ASPIRIN 81 MG: 81 TABLET, COATED ORAL at 09:12

## 2025-03-15 RX ADMIN — POTASSIUM CHLORIDE 10 MEQ: 7.46 INJECTION, SOLUTION INTRAVENOUS at 02:15

## 2025-03-15 RX ADMIN — POTASSIUM CHLORIDE 10 MEQ: 7.46 INJECTION, SOLUTION INTRAVENOUS at 07:11

## 2025-03-15 RX ADMIN — EZETIMIBE 10 MG: 10 TABLET ORAL at 09:11

## 2025-03-15 RX ADMIN — POTASSIUM CHLORIDE 10 MEQ: 7.46 INJECTION, SOLUTION INTRAVENOUS at 05:30

## 2025-03-15 RX ADMIN — POTASSIUM CHLORIDE: 2 INJECTION, SOLUTION, CONCENTRATE INTRAVENOUS at 16:16

## 2025-03-15 RX ADMIN — AMLODIPINE BESYLATE 10 MG: 5 TABLET ORAL at 09:11

## 2025-03-15 RX ADMIN — POTASSIUM CHLORIDE 20 MEQ: 750 TABLET, FILM COATED, EXTENDED RELEASE ORAL at 09:11

## 2025-03-15 RX ADMIN — POTASSIUM CHLORIDE 10 MEQ: 7.46 INJECTION, SOLUTION INTRAVENOUS at 03:21

## 2025-03-15 RX ADMIN — HEPARIN SODIUM 5000 UNITS: 5000 INJECTION INTRAVENOUS; SUBCUTANEOUS at 21:37

## 2025-03-15 RX ADMIN — DONEPEZIL HYDROCHLORIDE 5 MG: 10 TABLET, FILM COATED ORAL at 21:36

## 2025-03-15 RX ADMIN — POTASSIUM CHLORIDE 10 MEQ: 7.46 INJECTION, SOLUTION INTRAVENOUS at 09:13

## 2025-03-15 RX ADMIN — POTASSIUM CHLORIDE 10 MEQ: 7.46 INJECTION, SOLUTION INTRAVENOUS at 04:22

## 2025-03-15 RX ADMIN — POTASSIUM CHLORIDE 20 MEQ: 750 TABLET, FILM COATED, EXTENDED RELEASE ORAL at 14:09

## 2025-03-15 RX ADMIN — HEPARIN SODIUM 5000 UNITS: 5000 INJECTION INTRAVENOUS; SUBCUTANEOUS at 09:12

## 2025-03-15 ASSESSMENT — PAIN SCALES - GENERAL: PAINLEVEL_OUTOF10: 0

## 2025-03-15 NOTE — ED NOTES
Verbal shift change report given to SRINIVASA Fairbanks, 4S PSBU (oncoming nurse) by SRINIVASA Earl (offgoing nurse). Report included the following information Nurse Handoff Report, ED SBAR, MAR, Recent Results, and Cardiac Rhythm sinus .

## 2025-03-16 LAB
ANION GAP SERPL CALC-SCNC: 7 MMOL/L (ref 2–12)
BUN SERPL-MCNC: 18 MG/DL (ref 6–20)
BUN/CREAT SERPL: 13 (ref 12–20)
CALCIUM SERPL-MCNC: 10.7 MG/DL (ref 8.5–10.1)
CHLORIDE SERPL-SCNC: 110 MMOL/L (ref 97–108)
CO2 SERPL-SCNC: 24 MMOL/L (ref 21–32)
CREAT SERPL-MCNC: 1.44 MG/DL (ref 0.55–1.02)
ERYTHROCYTE [DISTWIDTH] IN BLOOD BY AUTOMATED COUNT: 13.1 % (ref 11.5–14.5)
EST. AVERAGE GLUCOSE BLD GHB EST-MCNC: 105 MG/DL
GLUCOSE BLD STRIP.AUTO-MCNC: 100 MG/DL (ref 65–117)
GLUCOSE BLD STRIP.AUTO-MCNC: 101 MG/DL (ref 65–117)
GLUCOSE BLD STRIP.AUTO-MCNC: 113 MG/DL (ref 65–117)
GLUCOSE BLD STRIP.AUTO-MCNC: 115 MG/DL (ref 65–117)
GLUCOSE BLD STRIP.AUTO-MCNC: 196 MG/DL (ref 65–117)
GLUCOSE BLD STRIP.AUTO-MCNC: 69 MG/DL (ref 65–117)
GLUCOSE SERPL-MCNC: 129 MG/DL (ref 65–100)
HBA1C MFR BLD: 5.3 % (ref 4–5.6)
HCT VFR BLD AUTO: 31.4 % (ref 35–47)
HGB BLD-MCNC: 10.8 G/DL (ref 11.5–16)
MCH RBC QN AUTO: 31 PG (ref 26–34)
MCHC RBC AUTO-ENTMCNC: 34.4 G/DL (ref 30–36.5)
MCV RBC AUTO: 90.2 FL (ref 80–99)
NRBC # BLD: 0 K/UL (ref 0–0.01)
NRBC BLD-RTO: 0 PER 100 WBC
PLATELET # BLD AUTO: 201 K/UL (ref 150–400)
PMV BLD AUTO: 10.3 FL (ref 8.9–12.9)
POTASSIUM SERPL-SCNC: 3.4 MMOL/L (ref 3.5–5.1)
RBC # BLD AUTO: 3.48 M/UL (ref 3.8–5.2)
SERVICE CMNT-IMP: ABNORMAL
SERVICE CMNT-IMP: NORMAL
SODIUM SERPL-SCNC: 141 MMOL/L (ref 136–145)
WBC # BLD AUTO: 7.9 K/UL (ref 3.6–11)

## 2025-03-16 PROCEDURE — 6370000000 HC RX 637 (ALT 250 FOR IP): Performed by: INTERNAL MEDICINE

## 2025-03-16 PROCEDURE — 36415 COLL VENOUS BLD VENIPUNCTURE: CPT

## 2025-03-16 PROCEDURE — 2060000000 HC ICU INTERMEDIATE R&B

## 2025-03-16 PROCEDURE — 2580000003 HC RX 258: Performed by: INTERNAL MEDICINE

## 2025-03-16 PROCEDURE — 2580000003 HC RX 258: Performed by: HOSPITALIST

## 2025-03-16 PROCEDURE — 6360000002 HC RX W HCPCS

## 2025-03-16 PROCEDURE — 83521 IG LIGHT CHAINS FREE EACH: CPT

## 2025-03-16 PROCEDURE — 82962 GLUCOSE BLOOD TEST: CPT

## 2025-03-16 PROCEDURE — 87040 BLOOD CULTURE FOR BACTERIA: CPT

## 2025-03-16 PROCEDURE — 2500000003 HC RX 250 WO HCPCS: Performed by: INTERNAL MEDICINE

## 2025-03-16 PROCEDURE — 83036 HEMOGLOBIN GLYCOSYLATED A1C: CPT

## 2025-03-16 PROCEDURE — 84165 PROTEIN E-PHORESIS SERUM: CPT

## 2025-03-16 PROCEDURE — 86334 IMMUNOFIX E-PHORESIS SERUM: CPT

## 2025-03-16 PROCEDURE — 82088 ASSAY OF ALDOSTERONE: CPT

## 2025-03-16 PROCEDURE — 84155 ASSAY OF PROTEIN SERUM: CPT

## 2025-03-16 PROCEDURE — 6360000002 HC RX W HCPCS: Performed by: HOSPITALIST

## 2025-03-16 PROCEDURE — 85027 COMPLETE CBC AUTOMATED: CPT

## 2025-03-16 PROCEDURE — G0378 HOSPITAL OBSERVATION PER HR: HCPCS

## 2025-03-16 PROCEDURE — 6370000000 HC RX 637 (ALT 250 FOR IP): Performed by: HOSPITALIST

## 2025-03-16 PROCEDURE — 82784 ASSAY IGA/IGD/IGG/IGM EACH: CPT

## 2025-03-16 PROCEDURE — 2500000003 HC RX 250 WO HCPCS: Performed by: HOSPITALIST

## 2025-03-16 PROCEDURE — 1100000003 HC PRIVATE W/ TELEMETRY

## 2025-03-16 PROCEDURE — 80048 BASIC METABOLIC PNL TOTAL CA: CPT

## 2025-03-16 RX ORDER — HYDRALAZINE HYDROCHLORIDE 25 MG/1
25 TABLET, FILM COATED ORAL EVERY 8 HOURS SCHEDULED
Status: DISCONTINUED | OUTPATIENT
Start: 2025-03-16 | End: 2025-03-17

## 2025-03-16 RX ORDER — ZOLEDRONIC ACID 0.04 MG/ML
4 INJECTION, SOLUTION INTRAVENOUS ONCE
Status: DISCONTINUED | OUTPATIENT
Start: 2025-03-16 | End: 2025-03-16 | Stop reason: ALTCHOICE

## 2025-03-16 RX ORDER — CALCITONIN SALMON 200 [USP'U]/ML
4 INJECTION, SOLUTION INTRAMUSCULAR; SUBCUTANEOUS ONCE
Status: DISCONTINUED | OUTPATIENT
Start: 2025-03-16 | End: 2025-03-16 | Stop reason: ALTCHOICE

## 2025-03-16 RX ORDER — HYDRALAZINE HYDROCHLORIDE 20 MG/ML
5 INJECTION INTRAMUSCULAR; INTRAVENOUS EVERY 8 HOURS PRN
Status: DISCONTINUED | OUTPATIENT
Start: 2025-03-16 | End: 2025-03-19 | Stop reason: HOSPADM

## 2025-03-16 RX ORDER — POTASSIUM CHLORIDE 750 MG/1
40 TABLET, EXTENDED RELEASE ORAL ONCE
Status: COMPLETED | OUTPATIENT
Start: 2025-03-16 | End: 2025-03-16

## 2025-03-16 RX ADMIN — ASPIRIN 81 MG: 81 TABLET, COATED ORAL at 08:47

## 2025-03-16 RX ADMIN — HEPARIN SODIUM 5000 UNITS: 5000 INJECTION INTRAVENOUS; SUBCUTANEOUS at 08:47

## 2025-03-16 RX ADMIN — HEPARIN SODIUM 5000 UNITS: 5000 INJECTION INTRAVENOUS; SUBCUTANEOUS at 20:36

## 2025-03-16 RX ADMIN — SODIUM CHLORIDE, PRESERVATIVE FREE 10 ML: 5 INJECTION INTRAVENOUS at 08:46

## 2025-03-16 RX ADMIN — POTASSIUM CHLORIDE: 2 INJECTION, SOLUTION, CONCENTRATE INTRAVENOUS at 21:21

## 2025-03-16 RX ADMIN — VALSARTAN 160 MG: 160 TABLET, FILM COATED ORAL at 08:47

## 2025-03-16 RX ADMIN — INSULIN LISPRO 1 UNITS: 100 INJECTION, SOLUTION INTRAVENOUS; SUBCUTANEOUS at 08:46

## 2025-03-16 RX ADMIN — AMLODIPINE BESYLATE 10 MG: 5 TABLET ORAL at 08:47

## 2025-03-16 RX ADMIN — SODIUM CHLORIDE, PRESERVATIVE FREE 10 ML: 5 INJECTION INTRAVENOUS at 20:38

## 2025-03-16 RX ADMIN — POTASSIUM CHLORIDE 40 MEQ: 750 TABLET, FILM COATED, EXTENDED RELEASE ORAL at 08:46

## 2025-03-16 RX ADMIN — POTASSIUM CHLORIDE: 2 INJECTION, SOLUTION, CONCENTRATE INTRAVENOUS at 10:59

## 2025-03-16 RX ADMIN — DEXTROSE 125 ML: 10 SOLUTION INTRAVENOUS at 20:46

## 2025-03-16 RX ADMIN — HYDRALAZINE HYDROCHLORIDE 25 MG: 25 TABLET ORAL at 15:24

## 2025-03-16 RX ADMIN — WATER 1000 MG: 1 INJECTION INTRAMUSCULAR; INTRAVENOUS; SUBCUTANEOUS at 16:57

## 2025-03-16 RX ADMIN — Medication 100 MG: at 08:46

## 2025-03-16 RX ADMIN — EZETIMIBE 10 MG: 10 TABLET ORAL at 08:47

## 2025-03-16 ASSESSMENT — PAIN SCALES - GENERAL: PAINLEVEL_OUTOF10: 0

## 2025-03-16 NOTE — FLOWSHEET NOTE
1244: Dr. Aviles made aware of axillary temp of 96 F. Warm Blankets applied, no new orders at this time. Pt up in chair eating tray. Noted pt to be pocketing food and unable to swallow when prompted.     1330: Temp rechecked, 96.7 F axillary, MD aware- per MD keep warm blankets on. Pt ambulated back to bed.     1445: Temp rechecked. 97.0 F axillary. Labs drawn and sent.     1517: Notified MD regarding temp of 96.3 axillary. Orders placed for Bairhugger. Pt was placed on bairhugger.

## 2025-03-17 ENCOUNTER — APPOINTMENT (OUTPATIENT)
Facility: HOSPITAL | Age: 89
DRG: 683 | End: 2025-03-17
Payer: MEDICARE

## 2025-03-17 LAB
ALBUMIN SERPL-MCNC: 2.9 G/DL (ref 3.5–5)
ALBUMIN/GLOB SERPL: 0.9 (ref 1.1–2.2)
ALP SERPL-CCNC: 64 U/L (ref 45–117)
ALT SERPL-CCNC: 13 U/L (ref 12–78)
ANION GAP SERPL CALC-SCNC: 8 MMOL/L (ref 2–12)
AST SERPL-CCNC: 20 U/L (ref 15–37)
BILIRUB SERPL-MCNC: 0.3 MG/DL (ref 0.2–1)
BUN SERPL-MCNC: 13 MG/DL (ref 6–20)
BUN/CREAT SERPL: 11 (ref 12–20)
CALCIUM SERPL-MCNC: 10.8 MG/DL (ref 8.5–10.1)
CHLORIDE SERPL-SCNC: 115 MMOL/L (ref 97–108)
CO2 SERPL-SCNC: 23 MMOL/L (ref 21–32)
COMMENT:: NORMAL
CREAT SERPL-MCNC: 1.16 MG/DL (ref 0.55–1.02)
GLOBULIN SER CALC-MCNC: 3.1 G/DL (ref 2–4)
GLUCOSE BLD STRIP.AUTO-MCNC: 113 MG/DL (ref 65–117)
GLUCOSE BLD STRIP.AUTO-MCNC: 133 MG/DL (ref 65–117)
GLUCOSE BLD STRIP.AUTO-MCNC: 170 MG/DL (ref 65–117)
GLUCOSE BLD STRIP.AUTO-MCNC: 92 MG/DL (ref 65–117)
GLUCOSE SERPL-MCNC: 98 MG/DL (ref 65–100)
POTASSIUM SERPL-SCNC: 3 MMOL/L (ref 3.5–5.1)
PROT SERPL-MCNC: 6 G/DL (ref 6.4–8.2)
SERVICE CMNT-IMP: ABNORMAL
SERVICE CMNT-IMP: ABNORMAL
SERVICE CMNT-IMP: NORMAL
SERVICE CMNT-IMP: NORMAL
SODIUM SERPL-SCNC: 146 MMOL/L (ref 136–145)
SPECIMEN HOLD: NORMAL

## 2025-03-17 PROCEDURE — 83521 IG LIGHT CHAINS FREE EACH: CPT

## 2025-03-17 PROCEDURE — 84165 PROTEIN E-PHORESIS SERUM: CPT

## 2025-03-17 PROCEDURE — 2580000003 HC RX 258: Performed by: HOSPITALIST

## 2025-03-17 PROCEDURE — 6360000002 HC RX W HCPCS: Performed by: HOSPITALIST

## 2025-03-17 PROCEDURE — 2060000000 HC ICU INTERMEDIATE R&B

## 2025-03-17 PROCEDURE — 6370000000 HC RX 637 (ALT 250 FOR IP): Performed by: HOSPITALIST

## 2025-03-17 PROCEDURE — 92610 EVALUATE SWALLOWING FUNCTION: CPT

## 2025-03-17 PROCEDURE — 2500000003 HC RX 250 WO HCPCS: Performed by: INTERNAL MEDICINE

## 2025-03-17 PROCEDURE — 82784 ASSAY IGA/IGD/IGG/IGM EACH: CPT

## 2025-03-17 PROCEDURE — 6360000002 HC RX W HCPCS

## 2025-03-17 PROCEDURE — 71045 X-RAY EXAM CHEST 1 VIEW: CPT

## 2025-03-17 PROCEDURE — 6370000000 HC RX 637 (ALT 250 FOR IP): Performed by: INTERNAL MEDICINE

## 2025-03-17 PROCEDURE — 82962 GLUCOSE BLOOD TEST: CPT

## 2025-03-17 PROCEDURE — 2500000003 HC RX 250 WO HCPCS: Performed by: HOSPITALIST

## 2025-03-17 PROCEDURE — 80053 COMPREHEN METABOLIC PANEL: CPT

## 2025-03-17 PROCEDURE — 86334 IMMUNOFIX E-PHORESIS SERUM: CPT

## 2025-03-17 RX ORDER — CASTOR OIL AND BALSAM, PERU 788; 87 MG/G; MG/G
OINTMENT TOPICAL EVERY 12 HOURS
Status: DISCONTINUED | OUTPATIENT
Start: 2025-03-17 | End: 2025-03-19 | Stop reason: HOSPADM

## 2025-03-17 RX ORDER — HYDRALAZINE HYDROCHLORIDE 25 MG/1
50 TABLET, FILM COATED ORAL EVERY 8 HOURS SCHEDULED
Status: DISCONTINUED | OUTPATIENT
Start: 2025-03-17 | End: 2025-03-19 | Stop reason: HOSPADM

## 2025-03-17 RX ORDER — POTASSIUM CHLORIDE 750 MG/1
40 TABLET, EXTENDED RELEASE ORAL 2 TIMES DAILY
Status: COMPLETED | OUTPATIENT
Start: 2025-03-17 | End: 2025-03-17

## 2025-03-17 RX ORDER — CIPROFLOXACIN HYDROCHLORIDE 3.5 MG/ML
1 SOLUTION/ DROPS TOPICAL
Status: DISCONTINUED | OUTPATIENT
Start: 2025-03-17 | End: 2025-03-19 | Stop reason: HOSPADM

## 2025-03-17 RX ADMIN — POTASSIUM CHLORIDE 40 MEQ: 750 TABLET, FILM COATED, EXTENDED RELEASE ORAL at 10:15

## 2025-03-17 RX ADMIN — CIPROFLOXACIN HYDROCHLORIDE 1 DROP: 3 SOLUTION/ DROPS OPHTHALMIC at 16:07

## 2025-03-17 RX ADMIN — POTASSIUM CHLORIDE 40 MEQ: 750 TABLET, FILM COATED, EXTENDED RELEASE ORAL at 21:48

## 2025-03-17 RX ADMIN — POTASSIUM CHLORIDE: 2 INJECTION, SOLUTION, CONCENTRATE INTRAVENOUS at 04:18

## 2025-03-17 RX ADMIN — CIPROFLOXACIN HYDROCHLORIDE 1 DROP: 3 SOLUTION/ DROPS OPHTHALMIC at 22:05

## 2025-03-17 RX ADMIN — HEPARIN SODIUM 5000 UNITS: 5000 INJECTION INTRAVENOUS; SUBCUTANEOUS at 21:48

## 2025-03-17 RX ADMIN — CIPROFLOXACIN HYDROCHLORIDE 1 DROP: 3 SOLUTION/ DROPS OPHTHALMIC at 22:06

## 2025-03-17 RX ADMIN — METOPROLOL TARTRATE 25 MG: 25 TABLET, FILM COATED ORAL at 10:15

## 2025-03-17 RX ADMIN — CIPROFLOXACIN HYDROCHLORIDE 1 DROP: 3 SOLUTION/ DROPS OPHTHALMIC at 18:04

## 2025-03-17 RX ADMIN — METOPROLOL TARTRATE 25 MG: 25 TABLET, FILM COATED ORAL at 21:48

## 2025-03-17 RX ADMIN — SODIUM CHLORIDE, PRESERVATIVE FREE 10 ML: 5 INJECTION INTRAVENOUS at 22:06

## 2025-03-17 RX ADMIN — HYDRALAZINE HYDROCHLORIDE 50 MG: 25 TABLET ORAL at 13:39

## 2025-03-17 RX ADMIN — AMLODIPINE BESYLATE 10 MG: 5 TABLET ORAL at 10:15

## 2025-03-17 RX ADMIN — SODIUM CHLORIDE, PRESERVATIVE FREE 10 ML: 5 INJECTION INTRAVENOUS at 10:16

## 2025-03-17 RX ADMIN — HEPARIN SODIUM 5000 UNITS: 5000 INJECTION INTRAVENOUS; SUBCUTANEOUS at 10:14

## 2025-03-17 RX ADMIN — HYDRALAZINE HYDROCHLORIDE 5 MG: 20 INJECTION INTRAMUSCULAR; INTRAVENOUS at 01:45

## 2025-03-17 RX ADMIN — ASPIRIN 81 MG: 81 TABLET, COATED ORAL at 10:15

## 2025-03-17 RX ADMIN — Medication 100 MG: at 10:15

## 2025-03-17 RX ADMIN — POTASSIUM CHLORIDE: 2 INJECTION, SOLUTION, CONCENTRATE INTRAVENOUS at 14:55

## 2025-03-17 RX ADMIN — Medication: at 16:07

## 2025-03-17 RX ADMIN — HYDRALAZINE HYDROCHLORIDE 50 MG: 25 TABLET ORAL at 21:48

## 2025-03-17 RX ADMIN — EZETIMIBE 10 MG: 10 TABLET ORAL at 10:15

## 2025-03-17 RX ADMIN — WATER 1000 MG: 1 INJECTION INTRAMUSCULAR; INTRAVENOUS; SUBCUTANEOUS at 16:07

## 2025-03-17 RX ADMIN — VALSARTAN 160 MG: 160 TABLET, FILM COATED ORAL at 10:15

## 2025-03-17 ASSESSMENT — PAIN SCALES - GENERAL
PAINLEVEL_OUTOF10: 0

## 2025-03-17 NOTE — WOUND CARE
Wound Care Note:     New consult placed by nurse request for sacrum red, looks like a healing sore    Chart shows:  Admitted for hypokalemia  Past Medical History:   Diagnosis Date    CAD (coronary artery disease)     CAD (coronary artery disease) 01/15/2019    stents to LAD, LCX graft    Environmental allergies 2/2/2012    HTN (hypertension) 2/2/2012    Hypercholesteremia     Hyperlipidemia 2/2/2012    Hypertension     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     WBC = 7.9 on 3/16/25  Admitted from home    Assessment:   Patient is alert and talking, incontinent with no assistance needed in repositioning.    Bed: [facility]  Patient wearing briefs for incontinence.    Diet: Adult diet dysphagia- Minced and moist  Patient reports no pain.      Bilateral buttocks and sacral skin intact and without erythema.    1. POA  sacrum with hyperpigmentation and linear line that is hypopigmented.  No open area, no erythema.  Photo taken but was not saved.    2.  POA bilateral heels with light blanchable erythema.  Heels offloaded.  Venelex ointment to be ordered.    Spoke with Dr. Prado, wound care orders obtained.    Patient repositioned supine.  Heels offloaded on pillow.     Recommendations:    Sacrum and bilateral heels- Every 12 hours liberally apply Venelex ointment.    Skin Care & Pressure Prevention:  Minimize layers of linen/pads under patient to optimize support surface.    Turn/reposition approximately every 2 hours and offload heels.  Manage incontinence / promote continence   Nourishing Skin Cream to dry skin, minimize use of briefs when able    Discussed above plan with patient & SRINIVASA Lawler    Transition of Care: Plan to follow as needed while admitted to hospital.    Carolina \"Jessica\" BRITTANIE Adames, RN, ON  Certified Wound and Ostomy Nurse  office [phone]  Best way to contact me is through [facility]

## 2025-03-18 LAB
ANION GAP SERPL CALC-SCNC: 2 MMOL/L (ref 2–12)
BASOPHILS # BLD: 0.03 K/UL (ref 0–0.1)
BASOPHILS NFR BLD: 0.5 % (ref 0–1)
BUN SERPL-MCNC: 14 MG/DL (ref 6–20)
BUN/CREAT SERPL: 12 (ref 12–20)
CALCIUM SERPL-MCNC: 10.4 MG/DL (ref 8.5–10.1)
CHLORIDE SERPL-SCNC: 121 MMOL/L (ref 97–108)
CO2 SERPL-SCNC: 23 MMOL/L (ref 21–32)
CREAT SERPL-MCNC: 1.21 MG/DL (ref 0.55–1.02)
DIFFERENTIAL METHOD BLD: ABNORMAL
EOSINOPHIL # BLD: 0.08 K/UL (ref 0–0.4)
EOSINOPHIL NFR BLD: 1.4 % (ref 0–7)
ERYTHROCYTE [DISTWIDTH] IN BLOOD BY AUTOMATED COUNT: 13.2 % (ref 11.5–14.5)
GLUCOSE BLD STRIP.AUTO-MCNC: 100 MG/DL (ref 65–117)
GLUCOSE BLD STRIP.AUTO-MCNC: 131 MG/DL (ref 65–117)
GLUCOSE BLD STRIP.AUTO-MCNC: 81 MG/DL (ref 65–117)
GLUCOSE BLD STRIP.AUTO-MCNC: 95 MG/DL (ref 65–117)
GLUCOSE SERPL-MCNC: 95 MG/DL (ref 65–100)
HCT VFR BLD AUTO: 30.7 % (ref 35–47)
HGB BLD-MCNC: 9.8 G/DL (ref 11.5–16)
IMM GRANULOCYTES # BLD AUTO: 0.02 K/UL (ref 0–0.04)
IMM GRANULOCYTES NFR BLD AUTO: 0.4 % (ref 0–0.5)
LYMPHOCYTES # BLD: 1.65 K/UL (ref 0.8–3.5)
LYMPHOCYTES NFR BLD: 29.7 % (ref 12–49)
MCH RBC QN AUTO: 30.9 PG (ref 26–34)
MCHC RBC AUTO-ENTMCNC: 31.9 G/DL (ref 30–36.5)
MCV RBC AUTO: 96.8 FL (ref 80–99)
MONOCYTES # BLD: 0.38 K/UL (ref 0–1)
MONOCYTES NFR BLD: 6.8 % (ref 5–13)
NEUTS SEG # BLD: 3.39 K/UL (ref 1.8–8)
NEUTS SEG NFR BLD: 61.2 % (ref 32–75)
NRBC # BLD: 0 K/UL (ref 0–0.01)
NRBC BLD-RTO: 0 PER 100 WBC
PLATELET # BLD AUTO: 146 K/UL (ref 150–400)
PMV BLD AUTO: 10.1 FL (ref 8.9–12.9)
POTASSIUM SERPL-SCNC: 3.8 MMOL/L (ref 3.5–5.1)
RBC # BLD AUTO: 3.17 M/UL (ref 3.8–5.2)
SERVICE CMNT-IMP: ABNORMAL
SERVICE CMNT-IMP: NORMAL
SODIUM SERPL-SCNC: 146 MMOL/L (ref 136–145)
WBC # BLD AUTO: 5.6 K/UL (ref 3.6–11)

## 2025-03-18 PROCEDURE — 36415 COLL VENOUS BLD VENIPUNCTURE: CPT

## 2025-03-18 PROCEDURE — 6370000000 HC RX 637 (ALT 250 FOR IP): Performed by: HOSPITALIST

## 2025-03-18 PROCEDURE — 6360000002 HC RX W HCPCS

## 2025-03-18 PROCEDURE — 85025 COMPLETE CBC W/AUTO DIFF WBC: CPT

## 2025-03-18 PROCEDURE — 2500000003 HC RX 250 WO HCPCS: Performed by: HOSPITALIST

## 2025-03-18 PROCEDURE — 92526 ORAL FUNCTION THERAPY: CPT

## 2025-03-18 PROCEDURE — 1100000000 HC RM PRIVATE

## 2025-03-18 PROCEDURE — 99223 1ST HOSP IP/OBS HIGH 75: CPT | Performed by: NURSE PRACTITIONER

## 2025-03-18 PROCEDURE — 82962 GLUCOSE BLOOD TEST: CPT

## 2025-03-18 PROCEDURE — 80048 BASIC METABOLIC PNL TOTAL CA: CPT

## 2025-03-18 PROCEDURE — 6370000000 HC RX 637 (ALT 250 FOR IP): Performed by: INTERNAL MEDICINE

## 2025-03-18 PROCEDURE — 6360000002 HC RX W HCPCS: Performed by: HOSPITALIST

## 2025-03-18 PROCEDURE — 2500000003 HC RX 250 WO HCPCS: Performed by: INTERNAL MEDICINE

## 2025-03-18 RX ORDER — CEFPODOXIME PROXETIL 100 MG/1
100 TABLET, FILM COATED ORAL 2 TIMES DAILY
Qty: 10 TABLET | Refills: 0 | Status: SHIPPED | OUTPATIENT
Start: 2025-03-18 | End: 2025-03-18 | Stop reason: HOSPADM

## 2025-03-18 RX ORDER — CIPROFLOXACIN HYDROCHLORIDE 3.5 MG/ML
1 SOLUTION/ DROPS TOPICAL
Qty: 1 EACH | Refills: 0 | Status: SHIPPED | OUTPATIENT
Start: 2025-03-18 | End: 2025-03-28

## 2025-03-18 RX ADMIN — VALSARTAN 160 MG: 160 TABLET, FILM COATED ORAL at 09:13

## 2025-03-18 RX ADMIN — ASPIRIN 81 MG: 81 TABLET, COATED ORAL at 09:12

## 2025-03-18 RX ADMIN — Medication: at 03:20

## 2025-03-18 RX ADMIN — WATER 1000 MG: 1 INJECTION INTRAMUSCULAR; INTRAVENOUS; SUBCUTANEOUS at 16:51

## 2025-03-18 RX ADMIN — CIPROFLOXACIN HYDROCHLORIDE 1 DROP: 3 SOLUTION/ DROPS OPHTHALMIC at 18:40

## 2025-03-18 RX ADMIN — EZETIMIBE 10 MG: 10 TABLET ORAL at 09:12

## 2025-03-18 RX ADMIN — HEPARIN SODIUM 5000 UNITS: 5000 INJECTION INTRAVENOUS; SUBCUTANEOUS at 09:11

## 2025-03-18 RX ADMIN — CIPROFLOXACIN HYDROCHLORIDE 1 DROP: 3 SOLUTION/ DROPS OPHTHALMIC at 10:34

## 2025-03-18 RX ADMIN — CIPROFLOXACIN HYDROCHLORIDE 1 DROP: 3 SOLUTION/ DROPS OPHTHALMIC at 14:47

## 2025-03-18 RX ADMIN — METOPROLOL TARTRATE 25 MG: 25 TABLET, FILM COATED ORAL at 09:12

## 2025-03-18 RX ADMIN — HEPARIN SODIUM 5000 UNITS: 5000 INJECTION INTRAVENOUS; SUBCUTANEOUS at 21:09

## 2025-03-18 RX ADMIN — CIPROFLOXACIN HYDROCHLORIDE 1 DROP: 3 SOLUTION/ DROPS OPHTHALMIC at 21:14

## 2025-03-18 RX ADMIN — Medication 100 MG: at 09:12

## 2025-03-18 RX ADMIN — CIPROFLOXACIN HYDROCHLORIDE 1 DROP: 3 SOLUTION/ DROPS OPHTHALMIC at 22:00

## 2025-03-18 RX ADMIN — CIPROFLOXACIN HYDROCHLORIDE 1 DROP: 3 SOLUTION/ DROPS OPHTHALMIC at 09:17

## 2025-03-18 RX ADMIN — AMLODIPINE BESYLATE 10 MG: 5 TABLET ORAL at 09:13

## 2025-03-18 RX ADMIN — Medication: at 14:47

## 2025-03-18 RX ADMIN — HYDRALAZINE HYDROCHLORIDE 50 MG: 25 TABLET ORAL at 06:22

## 2025-03-18 RX ADMIN — HYDRALAZINE HYDROCHLORIDE 50 MG: 25 TABLET ORAL at 14:47

## 2025-03-18 RX ADMIN — HYDRALAZINE HYDROCHLORIDE 5 MG: 20 INJECTION INTRAMUSCULAR; INTRAVENOUS at 11:02

## 2025-03-18 RX ADMIN — METOPROLOL TARTRATE 25 MG: 25 TABLET, FILM COATED ORAL at 21:09

## 2025-03-18 RX ADMIN — CIPROFLOXACIN HYDROCHLORIDE 1 DROP: 3 SOLUTION/ DROPS OPHTHALMIC at 07:18

## 2025-03-18 RX ADMIN — SODIUM CHLORIDE, PRESERVATIVE FREE 10 ML: 5 INJECTION INTRAVENOUS at 21:20

## 2025-03-18 RX ADMIN — HYDRALAZINE HYDROCHLORIDE 50 MG: 25 TABLET ORAL at 21:09

## 2025-03-18 RX ADMIN — CIPROFLOXACIN HYDROCHLORIDE 1 DROP: 3 SOLUTION/ DROPS OPHTHALMIC at 16:38

## 2025-03-18 RX ADMIN — CIPROFLOXACIN HYDROCHLORIDE 1 DROP: 3 SOLUTION/ DROPS OPHTHALMIC at 12:40

## 2025-03-18 RX ADMIN — SODIUM CHLORIDE, PRESERVATIVE FREE 10 ML: 5 INJECTION INTRAVENOUS at 09:14

## 2025-03-18 ASSESSMENT — PAIN SCALES - GENERAL: PAINLEVEL_OUTOF10: 0

## 2025-03-18 NOTE — CONSULTS
Palliative Medicine  Patient Name: Kassandra Díaz  YOB: 1931  MRN: [id number]  Age: 89 y.o.  Gender: female    Date of Initial Consult: March 18, 2025  Date of Service: 3/18/2025  Time: 3:15 PM  Provider: Dasia Cornelius NP  Hospital Day: 5  Admit Date: 3/14/2025  Referring Provider: Dr. Prado       Reasons for Consultation:  Other: hospice appropriate    HISTORY OF PRESENT ILLNESS (HPI):   Kassandra Díaz is a 89 y.o. female  who was admitted on 3/14/2025 from home with a diagnosis of severe hypokalemia, bradycardia, hypercalcemia, poor oral intake, ?MM based on workup.    Presented with c/o weakness, decreased appetite. We have been asked to support with care goals to determine hospice appropriateness.     PMH: CAD s/p stents, HTN, CABG, CKD3, dementia, HLD, former smoker    Psychosocial:   Lives at home with daughter. Pt has 3 children. Native of [place] Member of [employer]. Worked in a factory.     PALLIATIVE DIAGNOSES:    sarcopenia  Poor appetite   Anasarca  Physical debility  Palliative care encounter    Pertinent Hospital Diagnoses:  Principal Problem:    Hypokalemia  Resolved Problems:    * No resolved hospital problems. *      ASSESSMENT AND PLAN:   Pt seen without family present. Pt is alert and demonstrates partial capacity for decisions. Services introduced. Family not at the bedside. Called daughter Eileen and grandson Luciano to discuss plan of care.  Understanding of Illness/Discussion: I reviewed and updated the patient, dtr, and grandson information of the patient's current medical issues, concerns, options for care, questions, concerns.  Pt and family able to verbalize an understanding.  Explained that the burdens of a feeding tube would outweigh the benefits and the pt does not want it. Explained that and aggressive workup for MM would not be warranted as we would not do anything with the information and the pt and family agree.  The pt agrees with a plan to go home and eat when she feels 
Patient seen, consult dictated  Discussed with daughter  
with regular rate and rhythm.  ABDOMEN:  Flat, soft.  EXTREMITIES:  With significant muscle atrophy.  No edema.    LABORATORY DATA:  Yesterday, potassium was 2, today after given several doses of IV and oral supplementation, potassium remains at 2.  BUN is 32, creatinine is 2.1, calcium was 12.1, now it is 11.1.  Hemoglobin is 9.7.    IMPRESSION:    1. Acute on chronic kidney injury from intravascular volume contractions related to dehydration.  2. Persistent severe hypokalemia with at least 200 mEq potassium deficit, which is related to the patient's poor oral intake.  It does not seem that she was on diuretics.  The magnesium level is not checked at this time.  3. Dementia.  4. Adult failure to thrive due to old age.    RECOMMENDATIONS:    1. Continue replacement of potassium and check magnesium level.  If low, supplement.  2. Increase the rate of the IV fluids.  3. I had a discussion with the patient's daughter regarding nutrition.  The patient is not willing, unable to feed herself.  She is DNR.  Placement of a PEG tube would be too aggressive given her old age and dementia.  Comfort care and hospice would be more appropriate.  I will leave the decision to be made by the family.  Apparently, the patient's  is the next of kin, who helps with making the decisions for her.  4. Continue monitoring electrolytes.  Thank you very much for the opportunity to be part of this patient's care.  We will follow up with you closely.        LUIS A THOMAS MD      LTD/AQS  D:  03/15/2025 10:16:03  T:  03/15/2025 10:47:43  JOB #:  [id number]    CC:   Luis A Thomas MD

## 2025-03-18 NOTE — DISCHARGE SUMMARY
[place], [place] - [address] - P [phone] - F [phone] [address], [facility] [address]      Phone: [phone]   ciprofloxacin 0.3 % ophthalmic solution           NOTIFY YOUR PHYSICIAN FOR ANY OF THE FOLLOWING:   Fever over 101 degrees for 24 hours.   Chest pain, shortness of breath, fever, chills, nausea, vomiting, diarrhea, change in mentation, falling, weakness, bleeding. Severe pain or pain not relieved by medications.  Or, any other signs or symptoms that you may have questions about.    DISPOSITION:   x Home With:   OT  PT  HH  RN       Long term SNF/Inpatient Rehab    Independent/assisted living   x Hospice    Other:       PATIENT CONDITION AT DISCHARGE:     Functional status   x Poor     Deconditioned     Independent      Cognition     Lucid     Forgetful    xx Dementia      Catheters/lines (plus indication)    Go     PICC     PEG     None      Code status     Full code    x DNR      PHYSICAL EXAMINATION AT DISCHARGE:    See today's note    CHRONIC MEDICAL DIAGNOSES:      Greater than 31 minutes were spent with the patient on counseling and coordination of care    Signed:   Juan R Prado MD  3/18/2025  5:34 PM

## 2025-03-18 NOTE — DISCHARGE INSTRUCTIONS
Discharge Instructions       PATIENT ID: Kassandra Díaz  MRN: [id number]   YOB: 1931    DATE OF ADMISSION: 3/14/2025   DATE OF DISCHARGE: 3/18/2025    PRIMARY CARE PROVIDER: Selena Nagel     ATTENDING PHYSICIAN: Juan R Prado MD   DISCHARGING PROVIDER: Juan R Prado MD    To contact this individual call [phone] and ask the  to page.   If unavailable ask to be transferred the Adult Hospitalist Department.    DISCHARGE DIAGNOSES Hypercalcemia probably due to hyper parathyroidism vs MM     CONSULTATIONS: [unfilled]    PROCEDURES/SURGERIES: * No surgery found *    PENDING TEST RESULTS:   At the time of discharge the following test results are still pending: SPEP     FOLLOW UP APPOINTMENTS:   @[facility]@     ADDITIONAL CARE RECOMMENDATIONS:       DIET: cardiac diet  Oral Nutritional Supplements:     ACTIVITY: activity as tolerated    WOUND CARE:     EQUIPMENT needed:       DISCHARGE MEDICATIONS:   See Medication Reconciliation Form    It is important that you take the medication exactly as they are prescribed.   Keep your medication in the bottles provided by the pharmacist and keep a list of the medication names, dosages, and times to be taken in your wallet.   Do not take other medications without consulting your doctor.       NOTIFY YOUR PHYSICIAN FOR ANY OF THE FOLLOWING:   Fever over 101 degrees for 24 hours.   Chest pain, shortness of breath, fever, chills, nausea, vomiting, diarrhea, change in mentation, falling, weakness, bleeding. Severe pain or pain not relieved by medications.  Or, any other signs or symptoms that you may have questions about.      DISPOSITION:    Home With:   OT  PT  HH  RN       SNF/Inpatient Rehab/LTAC    Independent/assisted living    Hospice    Other:     CDMP Checked:   Yes x     PROBLEM LIST Updated:  Yes x       Signed:   Juan R Prado MD  3/18/2025  11:57 AM

## 2025-03-19 VITALS
DIASTOLIC BLOOD PRESSURE: 63 MMHG | TEMPERATURE: 97.5 F | BODY MASS INDEX: 16.41 KG/M2 | SYSTOLIC BLOOD PRESSURE: 170 MMHG | WEIGHT: 92.6 LBS | HEIGHT: 63 IN | RESPIRATION RATE: 16 BRPM | HEART RATE: 56 BPM | OXYGEN SATURATION: 99 %

## 2025-03-19 LAB
ALBUMIN SERPL ELPH-MCNC: 3.2 G/DL (ref 2.9–4.4)
ALBUMIN/GLOB SERPL: 1.2 (ref 0.7–1.7)
ALPHA1 GLOB SERPL ELPH-MCNC: 0.2 G/DL (ref 0–0.4)
ALPHA2 GLOB SERPL ELPH-MCNC: 0.5 G/DL (ref 0.4–1)
B-GLOBULIN SERPL ELPH-MCNC: 0.8 G/DL (ref 0.7–1.3)
GAMMA GLOB SERPL ELPH-MCNC: 1.2 G/DL (ref 0.4–1.8)
GLOBULIN SER-MCNC: 2.7 G/DL (ref 2.2–3.9)
GLUCOSE BLD STRIP.AUTO-MCNC: 103 MG/DL (ref 65–117)
GLUCOSE BLD STRIP.AUTO-MCNC: 115 MG/DL (ref 65–117)
IGA SERPL-MCNC: 292 MG/DL (ref 64–422)
IGG SERPL-MCNC: 1396 MG/DL (ref 586–1602)
IGM SERPL-MCNC: 78 MG/DL (ref 26–217)
INTERPRETATION SERPL IEP-IMP: ABNORMAL
KAPPA LC FREE SER-MCNC: 48.3 MG/L (ref 3.3–19.4)
KAPPA LC FREE/LAMBDA FREE SER: 1.92 (ref 0.26–1.65)
LAMBDA LC FREE SERPL-MCNC: 25.1 MG/L (ref 5.7–26.3)
M PROTEIN SERPL ELPH-MCNC: ABNORMAL G/DL
PROT SERPL-MCNC: 5.9 G/DL (ref 6–8.5)
SERVICE CMNT-IMP: NORMAL
SERVICE CMNT-IMP: NORMAL

## 2025-03-19 PROCEDURE — 2500000003 HC RX 250 WO HCPCS: Performed by: INTERNAL MEDICINE

## 2025-03-19 PROCEDURE — 82962 GLUCOSE BLOOD TEST: CPT

## 2025-03-19 PROCEDURE — 6370000000 HC RX 637 (ALT 250 FOR IP): Performed by: INTERNAL MEDICINE

## 2025-03-19 PROCEDURE — 6370000000 HC RX 637 (ALT 250 FOR IP): Performed by: HOSPITALIST

## 2025-03-19 PROCEDURE — 6360000002 HC RX W HCPCS

## 2025-03-19 PROCEDURE — 99233 SBSQ HOSP IP/OBS HIGH 50: CPT | Performed by: NURSE PRACTITIONER

## 2025-03-19 RX ADMIN — AMLODIPINE BESYLATE 10 MG: 5 TABLET ORAL at 09:33

## 2025-03-19 RX ADMIN — Medication 100 MG: at 09:34

## 2025-03-19 RX ADMIN — EZETIMIBE 10 MG: 10 TABLET ORAL at 09:34

## 2025-03-19 RX ADMIN — METOPROLOL TARTRATE 25 MG: 25 TABLET, FILM COATED ORAL at 09:34

## 2025-03-19 RX ADMIN — Medication: at 02:00

## 2025-03-19 RX ADMIN — ASPIRIN 81 MG: 81 TABLET, COATED ORAL at 09:34

## 2025-03-19 RX ADMIN — CIPROFLOXACIN HYDROCHLORIDE 1 DROP: 3 SOLUTION/ DROPS OPHTHALMIC at 12:37

## 2025-03-19 RX ADMIN — CIPROFLOXACIN HYDROCHLORIDE 1 DROP: 3 SOLUTION/ DROPS OPHTHALMIC at 09:35

## 2025-03-19 RX ADMIN — SODIUM CHLORIDE, PRESERVATIVE FREE 10 ML: 5 INJECTION INTRAVENOUS at 09:36

## 2025-03-19 RX ADMIN — HEPARIN SODIUM 5000 UNITS: 5000 INJECTION INTRAVENOUS; SUBCUTANEOUS at 09:33

## 2025-03-19 RX ADMIN — VALSARTAN 160 MG: 160 TABLET, FILM COATED ORAL at 09:33

## 2025-03-19 RX ADMIN — HYDRALAZINE HYDROCHLORIDE 50 MG: 25 TABLET ORAL at 05:54

## 2025-03-19 RX ADMIN — CIPROFLOXACIN HYDROCHLORIDE 1 DROP: 3 SOLUTION/ DROPS OPHTHALMIC at 05:56

## 2025-03-19 ASSESSMENT — PAIN SCALES - GENERAL: PAINLEVEL_OUTOF10: 0

## 2025-03-19 NOTE — CARE COORDINATION
Brief Note:  CM was requested to meet with patient and her dter/grandson.  Family asked for additional help in providing for patient in their home.  CM gave list of  personal care provider agencies to the patient's family.    Patient planned for d/c on tomorrow (Tues. 3/18/25).  Family will be here at 10am.    OMID Schmid. CRM  [phone]  
Transition of Care Plan:    RUR: 13%   Prior Level of Functioning:   Disposition: Home with Hospice and Family Support  [facility]: [phone]    Per conversation with damion wellingtonJose DanielTiki [phone];  she reported that she is scheduled to meet with the pt's grandson at the bedside at 11am to sign consents. Rep reported that transport could be arranged for 12p. Rep requested a copy of the pt's DDNR and Advance Directive.   AIDEE: Today    Follow up appointments: PCP   DME needed: Defer to Hospice   Transportation at discharge: Family   IM/IMM Medicare/ letter given: Received   Caregiver Contact:   Primary Decision Maker: Luciano Díaz - Grandchild - [phone]    Secondary Decision Maker: Lizett San - Child - [phone]  Discharge Caregiver contacted prior to discharge?  N  Care Conference needed? N       
2024  Previous IPR/ SNF rehab :Yes with name of provider not remembered.  DME :cane, rolling walker, bedside commode, shower chair.     03/15/25 1135   Service Assessment   Patient Orientation Alert and Oriented   Cognition Alert   History Provided By Patient   Primary Caregiver Family   Accompanied By/Relationship None   Support Systems Children;Family Members   Patient's Healthcare Decision Maker is: Legal Next of Kin   PCP Verified by CM Yes   Last Visit to PCP Within last 3 months   Prior Functional Level Assistance with the following:;Bathing;Dressing;Toileting;Feeding;Cooking;Housework;Shopping;Mobility   Current Functional Level Assistance with the following:;Bathing;Dressing;Toileting;Feeding;Cooking;Housework;Shopping;Mobility   Can patient return to prior living arrangement Yes   Ability to make needs known: Fair   Family able to assist with home care needs: Yes   Would you like for me to discuss the discharge plan with any other family members/significant others, and if so, who? No   Financial Resources Medicare   Community Resources None

## 2025-03-19 NOTE — PALLIATIVE CARE DISCHARGE
Goals of Care/Treatment Preferences    The Palliative Medicine team was consulted as part of your/your loved one's care in the hospital. Our team is a supportive service; we strive to relieve suffering and improve quality of life.    We reviewed advance care planning information, which includes the following:    Primary Decision Maker: Luciano Díaz - Grandcristiane - [phone]    Secondary Decision Maker: Lizett San - Child - [phone]  Patient's Healthcare Decision Maker is:: Legal Next of Kin  Confirm Advance Directive: Yes, on file  Does the patient have other document types?: Power of , Do Not Resuscitate    Patient/Health Care Proxy Stated Goals: Comfort    We reviewed / discussed your code status as:   Code Status: DNR     “Full Code” means perform CPR in the event of cardiac arrest.      “DNR” means do NOT perform CPR in the event of cardiac arrest.      “Partial Code” means you have specific preferences, please discuss with your healthcare team.      “No Order” means this issue was not addressed / resolved during your stay          Because of the importance of this information, we are providing you with a printed copy to share with other healthcare providers after this hospitalization is complete.

## 2025-03-19 NOTE — PLAN OF CARE
Problem: Safety - Adult  Goal: Free from fall injury  3/16/2025 1010 by Anastasia Gamino RN  Outcome: Progressing  3/16/2025 0101 by Anushka Darden RN  Outcome: Progressing     Problem: Discharge Planning  Goal: Discharge to home or other facility with appropriate resources  3/16/2025 1010 by Anastasia Gamino RN  Outcome: Progressing  3/16/2025 0101 by Anushka Darden RN  Outcome: Progressing     Problem: Confusion  Goal: Confusion, delirium, dementia, or psychosis is improved or at baseline  Description: INTERVENTIONS:  1. Assess for possible contributors to thought disturbance, including medications, impaired vision or hearing, underlying metabolic abnormalities, dehydration, psychiatric diagnoses, and notify attending LIP  2. [place] high risk fall precautions, as indicated  3. Provide frequent short contacts to provide reality reorientation, refocusing and direction  4. Decrease environmental stimuli, including noise as appropriate  5. Monitor and intervene to maintain adequate nutrition, hydration, elimination, sleep and activity  6. If unable to ensure safety without constant attention obtain sitter and review sitter guidelines with assigned personnel  7. Initiate Psychosocial CNS and Spiritual Care consult, as indicated  3/16/2025 1010 by Anastasia Gamino RN  Outcome: Progressing  Flowsheets (Taken 3/16/2025 0800)  Effect of thought disturbance (confusion, delirium, dementia, or psychosis) are managed with adequate functional status: Assess for contributors to thought disturbance, including medications, impaired vision or hearing, underlying metabolic abnormalities, dehydration, psychiatric diagnoses, notify LIP  3/16/2025 0101 by Anushka Darden RN  Outcome: Progressing     Problem: Skin/Tissue Integrity  Goal: Skin integrity remains intact  Description: 1.  Monitor for areas of redness and/or skin breakdown  2.  Assess vascular access sites hourly  3.  Every 4-6 hours minimum:  Change oxygen saturation 
  Problem: Safety - Adult  Goal: Free from fall injury  3/18/2025 1247 by Ally Wilhelm RN  Outcome: Progressing     Problem: Discharge Planning  Goal: Discharge to home or other facility with appropriate resources  3/18/2025 1247 by Ally Wilhelm RN  Outcome: Progressing     Problem: Confusion  Goal: Confusion, delirium, dementia, or psychosis is improved or at baseline  Description: INTERVENTIONS:  1. Assess for possible contributors to thought disturbance, including medications, impaired vision or hearing, underlying metabolic abnormalities, dehydration, psychiatric diagnoses, and notify attending LIP  2. [place] high risk fall precautions, as indicated  3. Provide frequent short contacts to provide reality reorientation, refocusing and direction  4. Decrease environmental stimuli, including noise as appropriate  5. Monitor and intervene to maintain adequate nutrition, hydration, elimination, sleep and activity  6. If unable to ensure safety without constant attention obtain sitter and review sitter guidelines with assigned personnel  7. Initiate Psychosocial CNS and Spiritual Care consult, as indicated  3/18/2025 1247 by Ally Wilhelm RN  Outcome: Progressing     Problem: Skin/Tissue Integrity  Goal: Skin integrity remains intact  Description: 1.  Monitor for areas of redness and/or skin breakdown  2.  Assess vascular access sites hourly  3.  Every 4-6 hours minimum:  Change oxygen saturation probe site  4.  Every 4-6 hours:  If on nasal continuous positive airway pressure, respiratory therapy assess nares and determine need for appliance change or resting period  3/18/2025 1247 by Ally Wilhelm RN  Outcome: Progressing     Problem: ABCDS Injury Assessment  Goal: Absence of physical injury  3/18/2025 1247 by Ally Wilhelm RN  Outcome: Progressing     Problem: Pain  Goal: Verbalizes/displays adequate comfort level or baseline comfort 
  Problem: Safety - Adult  Goal: Free from fall injury  3/18/2025 2228 by Janina Fried RN  Outcome: Progressing  3/18/2025 1247 by Ally Wilhelm RN  Outcome: Progressing     Problem: Discharge Planning  Goal: Discharge to home or other facility with appropriate resources  3/18/2025 2228 by Janina Fried RN  Outcome: Progressing  3/18/2025 1247 by Ally Wilhelm RN  Outcome: Progressing  Flowsheets (Taken 3/18/2025 0830)  Discharge to home or other facility with appropriate resources: Identify barriers to discharge with patient and caregiver   Problem: Skin/Tissue Integrity  Goal: Skin integrity remains intact  Description: 1.  Monitor for areas of redness and/or skin breakdown  2.  Assess vascular access sites hourly  3.  Every 4-6 hours minimum:  Change oxygen saturation probe site  4.  Every 4-6 hours:  If on nasal continuous positive airway pressure, respiratory therapy assess nares and determine need for appliance change or resting period  3/18/2025 2228 by Janina Fried RN  Outcome: Progressing  3/18/2025 1247 by Ally Wilhelm RN  Outcome: Progressing  Flowsheets (Taken 3/18/2025 0830)  Skin Integrity Remains Intact: Monitor for areas of redness and/or skin breakdown     Problem: ABCDS Injury Assessment  Goal: Absence of physical injury  3/18/2025 2228 by Janina Fried RN  Outcome: Progressing  3/18/2025 1247 by Ally Wilhelm RN  Outcome: Progressing     Problem: Pain  Goal: Verbalizes/displays adequate comfort level or baseline comfort level  3/18/2025 2228 by Janina Fried RN  Outcome: Progressing  Flowsheets  Taken 3/18/2025 1911 by Ally Wilhelm RN  Verbalizes/displays adequate comfort level or baseline comfort level: Encourage patient to monitor pain and request assistance  Taken 3/18/2025 1600 by Ally Wilhelm RN  Verbalizes/displays adequate comfort level or baseline comfort level: Encourage patient to monitor pain and 
  Problem: Safety - Adult  Goal: Free from fall injury  3/19/2025 1218 by lAina Aviles  Outcome: Progressing     Problem: Discharge Planning  Goal: Discharge to home or other facility with appropriate resources  3/19/2025 1218 by Alina Aviles  Outcome: Adequate for Discharge     
  Problem: Safety - Adult  Goal: Free from fall injury  Outcome: Progressing     Problem: Discharge Planning  Goal: Discharge to home or other facility with appropriate resources  Outcome: Progressing     Problem: Confusion  Goal: Confusion, delirium, dementia, or psychosis is improved or at baseline  Description: INTERVENTIONS:  1. Assess for possible contributors to thought disturbance, including medications, impaired vision or hearing, underlying metabolic abnormalities, dehydration, psychiatric diagnoses, and notify attending LIP  2. [place] high risk fall precautions, as indicated  3. Provide frequent short contacts to provide reality reorientation, refocusing and direction  4. Decrease environmental stimuli, including noise as appropriate  5. Monitor and intervene to maintain adequate nutrition, hydration, elimination, sleep and activity  6. If unable to ensure safety without constant attention obtain sitter and review sitter guidelines with assigned personnel  7. Initiate Psychosocial CNS and Spiritual Care consult, as indicated  Outcome: Progressing     Problem: Skin/Tissue Integrity  Goal: Skin integrity remains intact  Description: 1.  Monitor for areas of redness and/or skin breakdown  2.  Assess vascular access sites hourly  3.  Every 4-6 hours minimum:  Change oxygen saturation probe site  4.  Every 4-6 hours:  If on nasal continuous positive airway pressure, respiratory therapy assess nares and determine need for appliance change or resting period  Outcome: Progressing  Flowsheets (Taken 3/15/2025 2030)  Skin Integrity Remains Intact: Monitor for areas of redness and/or skin breakdown     Problem: ABCDS Injury Assessment  Goal: Absence of physical injury  Outcome: Progressing     
Bedside and Verbal shift change report given to SRINIVASA Erickson (oncoming nurse) by SRINIVASA Fairbanks (offgoing nurse). Report included the following information SBAR, Kardex, ED Summary, MAR, and Cardiac Rhythm NSR-SB.     Problem: Safety - Adult  Goal: Free from fall injury  Outcome: Progressing     Problem: Discharge Planning  Goal: Discharge to home or other facility with appropriate resources  Outcome: Progressing     Problem: Confusion  Goal: Confusion, delirium, dementia, or psychosis is improved or at baseline  Description: INTERVENTIONS:  1. Assess for possible contributors to thought disturbance, including medications, impaired vision or hearing, underlying metabolic abnormalities, dehydration, psychiatric diagnoses, and notify attending LIP  2. [place] high risk fall precautions, as indicated  3. Provide frequent short contacts to provide reality reorientation, refocusing and direction  4. Decrease environmental stimuli, including noise as appropriate  5. Monitor and intervene to maintain adequate nutrition, hydration, elimination, sleep and activity  6. If unable to ensure safety without constant attention obtain sitter and review sitter guidelines with assigned personnel  7. Initiate Psychosocial CNS and Spiritual Care consult, as indicated  Outcome: Progressing     Problem: Skin/Tissue Integrity  Goal: Skin integrity remains intact  Description: 1.  Monitor for areas of redness and/or skin breakdown  2.  Assess vascular access sites hourly  3.  Every 4-6 hours minimum:  Change oxygen saturation probe site  4.  Every 4-6 hours:  If on nasal continuous positive airway pressure, respiratory therapy assess nares and determine need for appliance change or resting period  Outcome: Progressing  Flowsheets (Taken 3/14/2025 0962)  Skin Integrity Remains Intact: Monitor for areas of redness and/or skin breakdown     Problem: ABCDS Injury Assessment  Goal: Absence of physical injury  Outcome: Progressing     
Problem: Safety - Adult  Goal: Free from fall injury  3/15/2025 1029 by Richard Lara RN  Outcome: Progressing  Problem: Discharge Planning  Goal: Discharge to home or other facility with appropriate resources  3/15/2025 1029 by Richard Lara RN  Outcome: Progressing     Problem: Skin/Tissue Integrity  Goal: Skin integrity remains intact  Description: 1.  Monitor for areas of redness and/or skin breakdown  2.  Assess vascular access sites hourly  3.  Every 4-6 hours minimum:  Change oxygen saturation probe site  4.  Every 4-6 hours:  If on nasal continuous positive airway pressure, respiratory therapy assess nares and determine need for appliance change or resting period  3/15/2025 1029 by Richard Lara RN  Outcome: Progressing     Problem: ABCDS Injury Assessment  Goal: Absence of physical injury  3/15/2025 1029 by Richard Lara RN  Outcome: Progressing     
Speech LAnguage Pathology EVALUATION    Patient: Kassandra Díaz (89 y.o. female)  Date: 3/17/2025  Primary Diagnosis: Hypokalemia [E87.6]  Generalized weakness [R53.1]       Precautions:                     ASSESSMENT :    Patient presents with mild-moderate oral phase dysphagia symptoms at bedside, no pharyngeal symptoms observed in absence of objective imaging. Patient with improved oral clearance with small bites/sips and with solids consistent with minced/moist, therefore patient would benefit from a modified diet at this time and with use/demo safe swallowing strategies as listed below.     Patient will benefit from skilled intervention to address the above impairments.     PLAN :  Recommendations and Planned Interventions:  Diet: Minced and moist and thin liquids  Straws ok  Patient must be upright with all PO intake  1:1 assist with feeding  Alternate liquids/solids  Small bites/sips     Recommend next SLP session: re-assess swallow function    Acute SLP Services: SLP Plan of Care: 3 times/week. Patient's rehabilitation potential is considered to be Good.  Discharge Recommendations: Continue to assess pending progress     SUBJECTIVE:   Patient awake, alert, pleasantly confused, able to follow basic directions, receptive to clinician. Patient denies Hx dysphagia, denies Hx CVA, reports she eats regular solids and thin liquids at baseline. ST consulted secondary to noted pocketing with solids.     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 
Speech LAnguage Pathology TREATMENT    Patient: Kassandra Díaz (89 y.o. female)  Date: 3/18/2025  Primary Diagnosis: Hypokalemia [E87.6]  Generalized weakness [R53.1]       Precautions:                     ASSESSMENT :    Patient with improved swallow function and oral clearance trials advanced solids. Limited oral clearance with harder to chew solids consistent with regular textures. Patient with pharyngeal dysphagia symptoms at bedside and in absence of objective imaging. Recommend advancing to soft bite solids with continued use/demo safe swallowing strategies as listed below.      Patient will be discharged from skilled speech-language pathology services at this time.     PLAN :  Recommendations and Planned Interventions:  Diet: Soft and bite sized and thin liquids  Straws ok  Patient must be upright with all meals  Small bites/sips  Alternate liquids/solids  Check for pocketing  1:1 assist with feeding all meals  Meds whole with thin liquids or puree     Acute SLP Services: Continue to follow per SLP POC  Discharge Recommendations: Continue to assess pending progress     SUBJECTIVE:   Patient stated, “I'm hungry\". 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 
If on nasal continuous positive airway pressure, respiratory therapy assess nares and determine need for appliance change or resting period  Outcome: Progressing  Flowsheets (Taken 3/17/2025 2000)  Skin Integrity Remains Intact: Monitor for areas of redness and/or skin breakdown     Problem: ABCDS Injury Assessment  Goal: Absence of physical injury  Outcome: Progressing     Problem: Pain  Goal: Verbalizes/displays adequate comfort level or baseline comfort level  Outcome: Progressing

## 2025-03-19 NOTE — DISCHARGE SUMMARY
about.    DISPOSITION:   x Home With:   OT  PT  HH  RN       Long term SNF/Inpatient Rehab    Independent/assisted living   x Hospice    Other:       PATIENT CONDITION AT DISCHARGE:     Functional status   x Poor     Deconditioned     Independent      Cognition     Lucid     Forgetful    xx Dementia      Catheters/lines (plus indication)    Go     PICC     PEG     None      Code status     Full code    x DNR      PHYSICAL EXAMINATION AT DISCHARGE:    See today's note    CHRONIC MEDICAL DIAGNOSES:      Greater than 31 minutes were spent with the patient on counseling and coordination of care    Signed:   Juan R Prado MD  3/19/2025  10:13 AM

## 2025-03-21 LAB
ALDOST SERPL-MCNC: <1 NG/DL (ref 0–30)
BACTERIA SPEC CULT: NORMAL
BACTERIA SPEC CULT: NORMAL
SERVICE CMNT-IMP: NORMAL
SERVICE CMNT-IMP: NORMAL